# Patient Record
Sex: FEMALE | Race: WHITE | NOT HISPANIC OR LATINO | Employment: OTHER | URBAN - METROPOLITAN AREA
[De-identification: names, ages, dates, MRNs, and addresses within clinical notes are randomized per-mention and may not be internally consistent; named-entity substitution may affect disease eponyms.]

---

## 2017-01-18 ENCOUNTER — HOSPITAL ENCOUNTER (OUTPATIENT)
Dept: CT IMAGING | Facility: HOSPITAL | Age: 82
Discharge: HOME | End: 2017-01-18
Attending: THORACIC SURGERY (CARDIOTHORACIC VASCULAR SURGERY) | Admitting: THORACIC SURGERY (CARDIOTHORACIC VASCULAR SURGERY)

## 2017-01-18 DIAGNOSIS — R91.8 MULTIPLE PULMONARY NODULES: ICD-10-CM

## 2017-01-18 PROCEDURE — 71250 CT THORAX DX C-: CPT

## 2017-01-25 ENCOUNTER — OFFICE VISIT (OUTPATIENT)
Dept: SURGERY | Facility: CLINIC | Age: 82
End: 2017-01-25

## 2017-01-25 VITALS
DIASTOLIC BLOOD PRESSURE: 96 MMHG | BODY MASS INDEX: 31.76 KG/M2 | OXYGEN SATURATION: 96 % | HEIGHT: 64 IN | SYSTOLIC BLOOD PRESSURE: 160 MMHG | HEART RATE: 84 BPM | RESPIRATION RATE: 16 BRPM | WEIGHT: 186 LBS

## 2017-01-25 DIAGNOSIS — R91.8 PULMONARY NODULES/LESIONS, MULTIPLE: Primary | ICD-10-CM

## 2017-01-25 DIAGNOSIS — F41.9 ANXIETY: ICD-10-CM

## 2017-01-25 PROCEDURE — 99213 OFFICE O/P EST LOW 20 MIN: CPT | Performed by: NURSE PRACTITIONER

## 2017-01-25 RX ORDER — CLOPIDOGREL BISULFATE 75 MG/1
75 TABLET ORAL DAILY
Qty: 90 TABLET | Refills: 0 | Status: SHIPPED | OUTPATIENT
Start: 2017-01-25 | End: 2017-04-23 | Stop reason: SDUPTHER

## 2017-01-25 RX ORDER — FUROSEMIDE 20 MG/1
20 TABLET ORAL
COMMUNITY
Start: 2015-05-30 | End: 2018-05-08

## 2017-01-25 RX ORDER — THIAMINE HCL 100 MG
3000 TABLET ORAL
COMMUNITY
End: 2018-05-08

## 2017-01-25 RX ORDER — CITALOPRAM 10 MG/1
10 TABLET ORAL DAILY
Qty: 90 TABLET | Refills: 0 | Status: SHIPPED | OUTPATIENT
Start: 2017-01-25 | End: 2017-04-13 | Stop reason: SDUPTHER

## 2017-01-25 RX ORDER — ONDANSETRON 4 MG/1
4 TABLET, FILM COATED ORAL EVERY 6 HOURS
COMMUNITY
Start: 2015-09-10 | End: 2017-01-30 | Stop reason: CLARIF

## 2017-01-25 RX ORDER — VALSARTAN 160 MG/1
160 TABLET ORAL
COMMUNITY
Start: 2015-05-30 | End: 2017-01-25

## 2017-01-25 RX ORDER — FAMOTIDINE 20 MG/1
20 TABLET, FILM COATED ORAL DAILY
COMMUNITY

## 2017-01-25 NOTE — MR AVS SNAPSHOT
Siobhan Dueñas   1/25/2017 1:30 PM   Office Visit    Dept Phone:  [phone]   Encounter #:  [id number]    Provider:  BHAVYA William   Department:  [facility] THORACIC SURGERY                Your Full Care Plan              Today's Medication Changes          These changes are accurate as of: 1/25/17  1:54 PM.  If you have any questions, ask your nurse or doctor.               Medication(s)that have changed:     furosemide 20 MG tablet   Commonly known as:  LASIX   Take 20 mg by mouth.   What changed:  Another medication with the same name was removed. Continue taking this medication, and follow the directions you see here.   Changed by:  Juve Amin MA       ondansetron 4 MG tablet   Commonly known as:  ZOFRAN   Take 4 mg by mouth Every 6 (Six) Hours.   What changed:  Another medication with the same name was removed. Continue taking this medication, and follow the directions you see here.   Changed by:  Juve Amin MA       vitamin B-12 2500 MCG sublingual tablet tablet   Commonly known as:  CYANOCOBALAMIN   Place  under the tongue.   What changed:  Another medication with the same name was removed. Continue taking this medication, and follow the directions you see here.   Changed by:  Juve Amin MA                  Your Updated Medication List          This list is accurate as of: 1/25/17  1:54 PM.  Always use your most recent med list.                ACCU-CHEK INSTANT CONTROL liquid       allopurinol 100 MG tablet   Commonly known as:  ZYLOPRIM       calcium citrate-vitamin d 315-250 MG-UNIT tablet tablet   Commonly known as:  CALCITRATE       carvedilol 25 MG tablet   Commonly known as:  COREG       citalopram 10 MG tablet   Commonly known as:  CELEXA   Take 1 tablet by mouth daily.       clopidogrel 75 MG tablet   Commonly known as:  PLAVIX       famotidine 20 MG tablet   Commonly known as:  PEPCID       furosemide 20 MG tablet   Commonly known as:   ABRIL MAGALLON BLOOD GLUCOSE W/DEVICE kit       MULTIVITAMINS PO       ondansetron 4 MG tablet   Commonly known as:  ZOFRAN       pravastatin 80 MG tablet   Commonly known as:  PRAVACHOL   Take 1 tablet by mouth Daily for 90 days.       traMADol 50 MG tablet   Commonly known as:  ULTRAM   Take 1 tablet by mouth Every 6 (Six) Hours As Needed for moderate pain (4-6).       vitamin B-12 2500 MCG sublingual tablet tablet   Commonly known as:  CYANOCOBALAMIN       vitamin D 55833 UNITS capsule capsule   Commonly known as:  ERGOCALCIFEROL   Take 1 capsule by mouth 1 (One) Time Per Week.       vitamin D3 5000 UNITS capsule capsule               You Were Diagnosed With        Codes Comments    Pulmonary nodules/lesions, multiple    -  Primary ICD-10-CM: R91.8  ICD-9-CM: 793.19       Instructions     None    Patient Instructions History      Upcoming Appointments     Visit Type Date Time Department    OFFICE VISIT 1/25/2017  1:30 PM [facility]    [facility] 4/6/2017  9:30 AM [facility]    FOLLOW UP 4/13/2017  9:30 AM [facility]      MyChart Signup     Our records indicate that your [employer] account has been deactivated. If you would like to reactivate your account, please email [email or web address] or call [phone] to talk to our [employer] staff.             Other Info from Your Visit           Your Appointments     Apr 06, 2017  9:30 AM EDT   [facility] with ASHANTI BECK   [facility]IC[facility] (--)    [address] [facility] [address]   [phone]            Apr 13, 2017  9:30 AM EDT   Follow Up with Marino Higgins MD   [facility] MEDIC[facility] (--)    [address] [facility] [place] [address]   [phone]           Arrive 15 minutes prior to appointment.              Allergies     Hydrocodone  Nausea And Vomiting, Swelling    Mouth swelling    Hydromorphone  Swelling    Oxycodone  Swelling     "Atenolol      Codeine      Diovan [Valsartan]  Dizziness, Cough      Reason for Visit     Follow-up 6 MO F/U VISIT CT CHEST W/O CONTRAST      Vital Signs     Blood Pressure Pulse Respirations Height Weight Oxygen Saturation    160/96 (BP Location: Left arm, Patient Position: Sitting, Cuff Size: Adult) 84 16 64\" (162.6 cm) 186 lb (84.4 kg) 96%    Body Mass Index Smoking Status                31.93 kg/m2 Never Smoker          Problems and Diagnoses Noted     Pulmonary nodules/lesions, multiple    -  Primary        "

## 2017-01-25 NOTE — PROGRESS NOTES
Subjective     Patient ID: Siobhan Dueñas is a 81 y.o. female who presents to the office today for a 6 month follow up ct chest without contrast.    History of Present Illness    Siobhan Dueñas presents to the office today for continued follow-up and surveillance concerning prior history of multiple tiny pulmonary nodules. Since her last visit here she has done well. She has occasional shortness of breath with exertion which improves with rest.  She has had a non-productive cough the past 3 days which she feels is related to sinus drainage.  She denies any complaints of fever, chills, hemoptysis, pleuritic chest pain, night sweats, hoarseness, or unintentional weight loss.    Past Medical History   Diagnosis Date   • COPD (chronic obstructive pulmonary disease)    • Hypertension    • Pacemaker      Past Surgical History   Procedure Laterality Date   • Pacemaker implantation     • Colonoscopy     • Kidney surgery     • Cataract extraction     • Cardiac catheterization       mild dz, no stents     Family History   Problem Relation Age of Onset   • Heart disease Mother    • Hypertension Mother    • Heart disease Father      Social History   Substance Use Topics   • Smoking status: Never Smoker   • Smokeless tobacco: Never Used   • Alcohol use No     Allergies:  Hydrocodone; Hydromorphone; Oxycodone; Atenolol; Codeine; and Diovan [valsartan]    Review of Systems   Constitution: Negative.   HENT: Negative.    Eyes: Negative.    Respiratory: Positive for cough (non-productive cough x 3-4 days.) and shortness of breath (w/ exertion).    Endocrine: Negative.    Hematologic/Lymphatic: Negative.    Skin: Negative.    Musculoskeletal: Negative.    Gastrointestinal: Negative.    Genitourinary: Negative.    Neurological: Negative.    Psychiatric/Behavioral: Negative.        Vitals:    01/25/17 1314   BP: 160/96   Pulse: 84   Resp: 16   SpO2: 96%       Objective     Physical Exam   Constitutional: She is oriented to person, place,  and time. Vital signs are normal. She appears well-developed.   HENT:   Head: Normocephalic and atraumatic.   Neck: Normal range of motion. Neck supple.   Cardiovascular: Normal rate, regular rhythm, normal heart sounds and intact distal pulses.    No murmur heard.  Pulmonary/Chest: Effort normal and breath sounds normal. She has no wheezes. She has no rhonchi. She has no rales. She exhibits no tenderness.   Abdominal: Soft. There is no tenderness.   Musculoskeletal: Normal range of motion. She exhibits no edema or tenderness.   Neurological: She is alert and oriented to person, place, and time. She has normal strength.   Skin: Skin is warm and dry. No rash noted. No cyanosis or erythema.   Psychiatric: She has a normal mood and affect. Her behavior is normal.       Review of Radiographic Studies:    Study Result   CT OF THE CHEST WITHOUT CONTRAST      HISTORY: 81-year-old female with a history of multiple pulmonary nodules  and renal cell carcinoma undergoing follow-up evaluation.      TECHNIQUE: Contiguous axial images were obtained through the chest  without IV contrast.      COMPARISON: CT of the chest without contrast, 12/28/2015.      FINDINGS: Reidentified are innumerable subcentimeter pulmonary nodules  that are similar in size and density that are seen scattered throughout  all lobes of both lungs but are predominantly within a subpleural  location. None of the nodules appear significantly changed in size or  appearance when compared to the prior examination. No dominant pulmonary  nodule is appreciated. Stable subpleural scarring is seen at the base of  the right lower lobe. No evidence for mediastinal, hilar, or axillary  adenopathy is appreciated. A stable 1.4 cm precarinal lymph node is  noted. The visualized soft tissue structures of the upper abdomen have a  normal appearance. Multilevel degenerative disc disease is seen within  the thoracic spine.      IMPRESSION:  Multiple bilateral subcentimeter  pulmonary nodules as  described that do not appear appreciably changed when compared to the  prior study of 12/28/2015. Exact etiology of the pulmonary nodules is  uncertain, but the appearance appears stable and nonprogressive. This  likely represents a chronic quiescent pulmonary infectious process  although superimposed pulmonary metastases cannot be completely  excluded.      This report was finalized on 1/19/2017 8:18 PM by Dr. Gen Martino MD.         Assessment/Plan   Diagnoses and all orders for this visit:    Pulmonary nodules/lesions, multiple  -     CT Chest Without Contrast; Future      SUMMARY  Siobhan Dueñas has been doing well since she was last seen.  She denies any new complaints or concerns.  Her CT scan of chest remains stable and unchanged compared to prior study completed 12/28/15.  We will continue surveillance with a repeat CT scan of chest without contrast in one year and follow-up with us in the office.  In the meantime, instructed to contact us sooner for any problems or concerns.        Mariama Leo, APRN  01/27/17  3:57 PM

## 2017-01-26 RX ORDER — ALLOPURINOL 100 MG/1
TABLET ORAL
Qty: 90 TABLET | Refills: 2 | Status: SHIPPED | OUTPATIENT
Start: 2017-01-26 | End: 2017-10-20 | Stop reason: SDUPTHER

## 2017-01-30 RX ORDER — ONDANSETRON 4 MG/1
TABLET, FILM COATED ORAL
Qty: 30 TABLET | Refills: 0 | Status: SHIPPED | OUTPATIENT
Start: 2017-01-30 | End: 2018-05-21

## 2017-02-02 DIAGNOSIS — E55.9 VITAMIN D DEFICIENCY: ICD-10-CM

## 2017-02-02 RX ORDER — CARVEDILOL 12.5 MG/1
TABLET ORAL
Qty: 60 TABLET | Refills: 4 | Status: SHIPPED | OUTPATIENT
Start: 2017-02-02 | End: 2017-05-19

## 2017-02-02 RX ORDER — ERGOCALCIFEROL 1.25 MG/1
CAPSULE ORAL
Qty: 12 CAPSULE | Refills: 0 | Status: SHIPPED | OUTPATIENT
Start: 2017-02-02 | End: 2017-04-23 | Stop reason: SDUPTHER

## 2017-02-13 ENCOUNTER — OFFICE VISIT (OUTPATIENT)
Dept: FAMILY MEDICINE CLINIC | Facility: CLINIC | Age: 82
End: 2017-02-13

## 2017-02-13 VITALS
DIASTOLIC BLOOD PRESSURE: 86 MMHG | OXYGEN SATURATION: 96 % | HEART RATE: 76 BPM | TEMPERATURE: 98 F | HEIGHT: 64 IN | BODY MASS INDEX: 33.05 KG/M2 | WEIGHT: 193.6 LBS | SYSTOLIC BLOOD PRESSURE: 140 MMHG

## 2017-02-13 DIAGNOSIS — M51.34 DDD (DEGENERATIVE DISC DISEASE), THORACIC: ICD-10-CM

## 2017-02-13 DIAGNOSIS — M15.9 PRIMARY OSTEOARTHRITIS INVOLVING MULTIPLE JOINTS: Primary | ICD-10-CM

## 2017-02-13 PROCEDURE — 99213 OFFICE O/P EST LOW 20 MIN: CPT | Performed by: FAMILY MEDICINE

## 2017-02-13 NOTE — PROGRESS NOTES
Subjective   Siobhan Dueñas is a 81 y.o. female who is here for   Chief Complaint   Patient presents with   • arthritis pain     in lower back    .     History of Present Illness   Back Pain: Patient presents for presents evaluation of low back problems.  Symptoms have been present for several years and include pain in inbetween the shoulder blades (aching in character; 5/10 in severity). Initial inciting event: none. Symptoms are worst: morning. Alleviating factors identifiable by patient are heating pad and tylenol. Exacerbating factors identifiable by patient are standing. Treatments so far initiated by patient: heating pad, tylenol Previous lower back problems: known OA. Previous workup: CT of chest ( to eval her pulm nodules). Previous treatments: none.  Also with lumbar pain, feels better leaning forward.  Can not tolerate any pain meds  Is on Plavix so will need to restrict NSAID use.          The following portions of the patient's history were reviewed and updated as appropriate: allergies, current medications, past family history, past medical history, past social history, past surgical history and problem list.    Review of Systems    Objective   Physical Exam   Musculoskeletal:        Thoracic back: She exhibits tenderness and spasm.        Back:    Nursing note and vitals reviewed.  CT OF THE CHEST WITHOUT CONTRAST      HISTORY: 81-year-old female with a history of multiple pulmonary nodules  and renal cell carcinoma undergoing follow-up evaluation.      TECHNIQUE: Contiguous axial images were obtained through the chest  without IV contrast.      COMPARISON: CT of the chest without contrast, 12/28/2015.      FINDINGS: Reidentified are innumerable subcentimeter pulmonary nodules  that are similar in size and density that are seen scattered throughout  all lobes of both lungs but are predominantly within a subpleural  location. None of the nodules appear significantly changed in size or  appearance when  compared to the prior examination. No dominant pulmonary  nodule is appreciated. Stable subpleural scarring is seen at the base of  the right lower lobe. No evidence for mediastinal, hilar, or axillary  adenopathy is appreciated. A stable 1.4 cm precarinal lymph node is  noted. The visualized soft tissue structures of the upper abdomen have a  normal appearance. Multilevel degenerative disc disease is seen within  the thoracic spine.      IMPRESSION:  Multiple bilateral subcentimeter pulmonary nodules as  described that do not appear appreciably changed when compared to the  prior study of 12/28/2015. Exact etiology of the pulmonary nodules is  uncertain, but the appearance appears stable and nonprogressive. This  likely represents a chronic quiescent pulmonary infectious process  although superimposed pulmonary metastases cannot be completely  excluded.    Assessment/Plan   Siobhan was seen today for arthritis pain.    Diagnoses and all orders for this visit:    Primary osteoarthritis involving multiple joints    DDD (degenerative disc disease), thoracic  -     Ambulatory Referral to Physical Therapy Evaluate and treat      Patient Instructions   Heating pad is ok  PT to teach HEP  2 tylenol in am and pm; 1 advil at lunch      There are no discontinued medications.     No Follow-up on file.    Dr. Marino Higgins  [facility]  [place], [place].

## 2017-02-21 ENCOUNTER — TREATMENT (OUTPATIENT)
Dept: PHYSICAL THERAPY | Facility: CLINIC | Age: 82
End: 2017-02-21

## 2017-02-21 DIAGNOSIS — M51.34 DEGENERATION OF THORACIC INTERVERTEBRAL DISC: Primary | ICD-10-CM

## 2017-02-21 PROCEDURE — 97161 PT EVAL LOW COMPLEX 20 MIN: CPT | Performed by: PHYSICAL THERAPIST

## 2017-02-21 PROCEDURE — 97110 THERAPEUTIC EXERCISES: CPT | Performed by: PHYSICAL THERAPIST

## 2017-02-21 PROCEDURE — G8979 MOBILITY GOAL STATUS: HCPCS | Performed by: PHYSICAL THERAPIST

## 2017-02-21 PROCEDURE — G8978 MOBILITY CURRENT STATUS: HCPCS | Performed by: PHYSICAL THERAPIST

## 2017-02-21 PROCEDURE — 97035 APP MDLTY 1+ULTRASOUND EA 15: CPT | Performed by: PHYSICAL THERAPIST

## 2017-02-21 NOTE — PROGRESS NOTES
Physical Therapy Initial Evaluation and Plan of Care      Patient: Siobhan Dueñas   : 1935  Diagnosis/ICD-10 Code:  No primary diagnosis found.  Referring practitioner: Marino Higgins MD    Subjective Evaluation    History of Present Illness  Date of onset: 2017  Mechanism of injury: Progressive back pain from arthritis    Quality of life: good    Pain  Current pain ratin  At best pain rating: 3  At worst pain rating: 10  Location: mid- thoracic back pain   Quality: burning and dull ache  Relieving factors: heat  Exacerbated by: sleeping, lifting, prolonged standing and sitting.  Progression: no change    Diagnostic Tests  Abnormal CT scan: DDD thoracic     Treatments  Previous treatment: medication (tylenol)  Current treatment: physical therapy  Patient Goals  Patient goals for therapy: decreased pain, increased motion, increased strength and return to sport/leisure activities             Objective     Static Posture     Thoracic Spine  Hyperkyphosis.    Postural Observations  Seated posture: fair  Standing posture: fair        Palpation   Left   Tenderness of the erector spinae, lumbar paraspinals, quadratus lumborum and rhomboids.     Right Tenderness of the erector spinae, lumbar paraspinals, quadratus lumborum and rhomboids.     Active Range of Motion     Lumbar   Flexion: 100 degrees   Extension: 100 degrees   Left lateral flexion: 100 degrees   Right lateral flexion: 100 degrees   Left rotation: 100 degrees with pain  Right rotation: 100 degrees with pain    Strength/Myotome Testing     Left Hip   Planes of Motion   Flexion: 4    Right Hip   Planes of Motion   Flexion: 4    Left Knee   Flexion: 5  Extension: 5    Right Knee   Flexion: 5  Extension: 5    Left Ankle/Foot   Dorsiflexion: 5  Plantar flexion: 5    Right Ankle/Foot   Dorsiflexion: 5  Plantar flexion: 5    Tests       Thoracic   Negative slump.     Lumbar     Left   Negative crossed SLR and passive SLR.     Right   Negative crossed  SLR and passive SLR.          Assessment & Plan     Assessment  Impairments: abnormal muscle tone, abnormal or restricted ROM, activity intolerance, impaired physical strength and pain with function  Assessment details: 81 y.o female presents with 1) tender thoracic PVM's 2) decreased lumbar rotation ROM 3) decreased bilateral hip flexion strength 4) pain with prolonged sitting and standing  Prognosis: fair  Prognosis details: SHORT TERM GOALS:     4 weeks  1. Pt independent with HEP  2. Pt to demonstrate trunk AROM % of expected norms to allow for improved ability to perform ADL's  3. Pt to demonstrate bilateral hip strength 4+/5 in all planes to improved stability of the core/trunk     LONG TERM GOALS:   8 weeks    1. Pt to demonstrate ability to perform full functional squat with good form and without increased pain in the low back   2. Pt to report being able to work in the home without increase in pain in the back  3. No tenderness reported through thoracic PVM's    Plan  Therapy options: will be seen for skilled physical therapy services  Planned modality interventions: electrical stimulation/Russian stimulation, thermotherapy (hydrocollator packs), traction, ultrasound and cryotherapy  Planned therapy interventions: abdominal trunk stabilization, flexibility, functional ROM exercises, home exercise program, manual therapy, postural training, soft tissue mobilization, spinal/joint mobilization, strengthening, stretching and therapeutic activities  Frequency: 2x week  Duration in weeks: 8  Treatment plan discussed with: patient        Manual Therapy:         mins  41606;  Therapeutic Exercise:    15     mins  61280;     Neuromuscular Alethea:        mins  55649;    Therapeutic Activity:          mins  04877;     Gait Training:           mins  95885;     Ultrasound:     8     mins  23058;    Electrical Stimulation:         mins  08453 ( );  Dry Needling          mins self-pay    Timed Treatment:   23    mins   Total Treatment:     53   mins    PT SIGNATURE: Lety Maurice, PT   DATE TREATMENT INITIATED: 2/21/2017    Medicare Initial Certification  Certification Period: 5/22/2017  I certify that the therapy services are furnished while this patient is under my care.  The services outlined above are required by this patient, and will be reviewed every 90 days.     PHYSICIAN: Marino Higgins MD      DATE:     Please sign and return via fax to [phone].. Thank you, [facility].

## 2017-02-23 ENCOUNTER — TREATMENT (OUTPATIENT)
Dept: PHYSICAL THERAPY | Facility: CLINIC | Age: 82
End: 2017-02-23

## 2017-02-23 DIAGNOSIS — M51.34 DEGENERATION OF THORACIC INTERVERTEBRAL DISC: Primary | ICD-10-CM

## 2017-02-23 PROCEDURE — 97110 THERAPEUTIC EXERCISES: CPT | Performed by: PHYSICAL THERAPIST

## 2017-02-23 NOTE — PROGRESS NOTES
Physical Therapy Daily Progress Note    Time In 2:28  Time Out 3:18    Siobhan Spenser reports: I was sore after last treatment and doing all those exercises    Subjective     Objective     Palpation     Additional Palpation Details  Tender Bilateral T5-T10 PVM's and UT     See Exercise, Manual, and Modality Logs for complete treatment.       Assessment & Plan     Assessment  Assessment details: No significant improvement in PT thus far. Deficits persist in pain with there ex, tenderness, and decreased mobility. Cont PT 2-3x per week for mobility and strengthening - next visit attempt piriformis stretching and/or supine AH if tolerated.         Progress strengthening /stabilization /functional activity           Manual Therapy:        mins  73351;  Therapeutic Exercise:    27     mins  04948;     Neuromuscular Alethea:        mins  55993;    Therapeutic Activity:          mins  27714;     Gait Training:           mins  90826;     Ultrasound:     8     mins  30834;    Electrical Stimulation:         mins  36374 ( );  Dry Needling          mins self-pay    Timed Treatment:   35   mins   Total Treatment:     45   mins    Lety Maurice, PT  Physical Therapist  [place] License # [id number]

## 2017-02-27 ENCOUNTER — TREATMENT (OUTPATIENT)
Dept: PHYSICAL THERAPY | Facility: CLINIC | Age: 82
End: 2017-02-27

## 2017-02-27 DIAGNOSIS — M51.34 DEGENERATION OF THORACIC INTERVERTEBRAL DISC: Primary | ICD-10-CM

## 2017-02-27 PROCEDURE — 97110 THERAPEUTIC EXERCISES: CPT | Performed by: PHYSICAL THERAPIST

## 2017-02-27 PROCEDURE — 97530 THERAPEUTIC ACTIVITIES: CPT | Performed by: PHYSICAL THERAPIST

## 2017-02-27 NOTE — PROGRESS NOTES
Physical Therapy Daily Progress Note    Time In 3:25  Time Out 4:19    Siobhan Dueñas reports: I am doing great - no pain all weekend long    Subjective     Objective     Ambulation     Ambulation: Level Surfaces   Ambulation without assistive device: independent    Observational Gait   Walking speed and stride length within functional limits.     Quality of Movement During Gait   Trunk  Trunk within functional limits.    No tenderness T-spine and B medial border of scapula    See Exercise, Manual, and Modality Logs for complete treatment.       Assessment & Plan     Assessment  Assessment details: Improved subjective reports and tendernss. Deficits persist in pain end-range and decreased mobility. Cont PT 1-2x per week for mobility and strengthening - next visit attempt AH with hip add if tolerated and/or thoracic extension. Patient request to decrease PT to 1x per week.         Progress strengthening /stabilization /functional activity           Manual Therapy:        mins  33090;  Therapeutic Exercise:    30     mins  03545;     Neuromuscular Alethea:        mins  21069;    Therapeutic Activity:     10     mins  00515;     Gait Training:           mins  20953;     Ultrasound:          mins  36330;    Electrical Stimulation:         mins  65126 ( );  Dry Needling          mins self-pay    Timed Treatment:   40   mins   Total Treatment:     50   mins    Lety Maurice, PT  Physical Therapist  [place] License # [id number]

## 2017-03-06 ENCOUNTER — TREATMENT (OUTPATIENT)
Dept: PHYSICAL THERAPY | Facility: CLINIC | Age: 82
End: 2017-03-06

## 2017-03-06 DIAGNOSIS — M51.34 DEGENERATION OF THORACIC INTERVERTEBRAL DISC: Primary | ICD-10-CM

## 2017-03-06 PROCEDURE — 97110 THERAPEUTIC EXERCISES: CPT | Performed by: PHYSICAL THERAPIST

## 2017-03-06 NOTE — PROGRESS NOTES
Physical Therapy Daily Progress Note    Time In 2:30  Time Out 3:13    Siobhan Dueñas reports: I have a burning pain in my back that wakes me up at night    Subjective     Objective     Palpation   Left   Tenderness of the cervical paraspinals, middle trapezius and rhomboids.     Right Tenderness of the cervical paraspinals, middle trapezius and rhomboids.      See Exercise, Manual, and Modality Logs for complete treatment.       Assessment & Plan     Assessment  Assessment details: Increased pain in thoracic region at night with sleeping. Cont PT 1-2x per week for mobility and strengthening - next visit attempt AH with hip abd if tolerated and/or thoracic extension.        Progress strengthening /stabilization /functional activity           Manual Therapy:         mins  51337;  Therapeutic Exercise:    30     mins  89183;     Neuromuscular Alethea:        mins  57848;    Therapeutic Activity:          mins  54462;     Gait Training:           mins  90384;     Ultrasound:          mins  30099;    Electrical Stimulation:         mins  37945 ( );  Dry Needling          mins self-pay    Timed Treatment:   30   mins   Total Treatment:     40   mins    Lety Maurice, PT  Physical Therapist  [place] License # [id number]

## 2017-03-13 ENCOUNTER — TREATMENT (OUTPATIENT)
Dept: PHYSICAL THERAPY | Facility: CLINIC | Age: 82
End: 2017-03-13

## 2017-03-13 DIAGNOSIS — M51.34 DEGENERATION OF THORACIC INTERVERTEBRAL DISC: Primary | ICD-10-CM

## 2017-03-13 PROCEDURE — 97110 THERAPEUTIC EXERCISES: CPT | Performed by: PHYSICAL THERAPIST

## 2017-03-13 PROCEDURE — 97530 THERAPEUTIC ACTIVITIES: CPT | Performed by: PHYSICAL THERAPIST

## 2017-03-13 NOTE — PROGRESS NOTES
Physical Therapy Daily Progress Note    Time In 2:30  Time Out 3:23    Siobhan Dueñas reports: I have been doing my exercises twice a day - I think I am getting better    Subjective     Objective     Static Posture     Thoracic Spine  Hyperkyphosis.     See Exercise, Manual, and Modality Logs for complete treatment.       Assessment & Plan     Assessment  Assessment details: Improved subjective reports and thoracic mobility. Deficits persist with pain with prolonged standing.  Cont PT 1-2x per week for mobility and strengthening - next visit attempt thoracic extension if tolerated.        Progress strengthening /stabilization /functional activity           Manual Therapy:         mins  52124;  Therapeutic Exercise:    30     mins  78305;     Neuromuscular Alethea:        mins  57575;    Therapeutic Activity:     10     mins  43862;     Gait Training:           mins  89692;     Ultrasound:          mins  82584;    Electrical Stimulation:         mins  49456 ( );  Dry Needling          mins self-pay    Timed Treatment:   40   mins   Total Treatment:     50   mins    Lety Maurice, PT  Physical Therapist  [place] License # [id number]

## 2017-03-22 ENCOUNTER — TREATMENT (OUTPATIENT)
Dept: PHYSICAL THERAPY | Facility: CLINIC | Age: 82
End: 2017-03-22

## 2017-03-22 DIAGNOSIS — M51.34 DEGENERATION OF THORACIC INTERVERTEBRAL DISC: Primary | ICD-10-CM

## 2017-03-22 PROCEDURE — 97530 THERAPEUTIC ACTIVITIES: CPT | Performed by: PHYSICAL THERAPIST

## 2017-03-22 PROCEDURE — 97110 THERAPEUTIC EXERCISES: CPT | Performed by: PHYSICAL THERAPIST

## 2017-03-22 NOTE — PROGRESS NOTES
Physical Therapy Daily Progress Note    Time In 2:27  Time Out 3:19    Siobhan Dueñas reports: I am doing really good - think today is my last day -     Subjective     Objective     Tenderness     Lumbar Spine  No tenderness in the spinous process.     Left Hip   No tenderness in the PSIS.     Right Hip   No tenderness in the PSIS.     Active Range of Motion     Lumbar   Flexion: 100 degrees   Extension: 100 degrees   Left lateral flexion: 100 degrees   Right lateral flexion: 100 degrees   Left rotation: 100 degrees   Right rotation: 100 degrees     Strength/Myotome Testing     Left Hip   Planes of Motion   Left hip flexors strength: 5-/5.    Right Hip   Planes of Motion   Right hip flexors strength: 5-/5.    Left Knee   Flexion: 5  Extension: 5    Right Knee   Flexion: 5  Extension: 5    Left Ankle/Foot   Dorsiflexion: 5  Plantar flexion: 5  Inversion: 5  Eversion: 5    Right Ankle/Foot   Dorsiflexion: 5  Plantar flexion: 5  Inversion: 5  Eversion: 5    Tests       Thoracic   Negative slump.     Lumbar     Left   Negative crossed SLR.     Right   Negative crossed SLR.     Additional Tests Details  Negative Bilateral SHARIFA     See Exercise, Manual, and Modality Logs for complete treatment.       Assessment & Plan     Assessment  Assessment details: Patient presents with improved ROM, strength, and lumbar stability. Ms. Dueñas has met all STG and LTG's. Feel that continued compliance with her HEP would be beneficial at this time. She has been instructed with HEP progression.         Other           Manual Therapy:        mins  31686;  Therapeutic Exercise:    25     mins  57515;     Neuromuscular Alethea:        mins  81623;    Therapeutic Activity:     10     mins  19233;     Gait Training:           mins  61945;     Ultrasound:          mins  79265;    Electrical Stimulation:         mins  07589 ( );  Dry Needling          mins self-pay    Timed Treatment:   35   mins   Total Treatment:     45   mins    Lety KELSEY  Josafat PT  Physical Therapist  [place] License # [id number]

## 2017-04-05 DIAGNOSIS — E11.9 TYPE 2 DIABETES MELLITUS WITHOUT COMPLICATION, WITHOUT LONG-TERM CURRENT USE OF INSULIN (HCC): ICD-10-CM

## 2017-04-05 DIAGNOSIS — E55.9 VITAMIN D DEFICIENCY: ICD-10-CM

## 2017-04-05 DIAGNOSIS — E78.2 MIXED HYPERLIPIDEMIA: Primary | ICD-10-CM

## 2017-04-06 LAB
25(OH)D3+25(OH)D2 SERPL-MCNC: 27.6 NG/ML
ALBUMIN SERPL-MCNC: 4.1 G/DL (ref 3.5–5.2)
ALBUMIN/GLOB SERPL: 1.1 G/DL
ALP SERPL-CCNC: 54 U/L (ref 40–129)
ALT SERPL-CCNC: 6 U/L (ref 5–33)
AST SERPL-CCNC: 14 U/L (ref 5–32)
BASOPHILS # BLD AUTO: 0.02 10*3/MM3 (ref 0–0.2)
BASOPHILS NFR BLD AUTO: 0.4 % (ref 0–2)
BILIRUB SERPL-MCNC: 0.3 MG/DL (ref 0.2–1.2)
BUN SERPL-MCNC: 25 MG/DL (ref 8–23)
BUN/CREAT SERPL: 21.6 (ref 7–25)
CALCIUM SERPL-MCNC: 9.2 MG/DL (ref 8.8–10.5)
CHLORIDE SERPL-SCNC: 98 MMOL/L (ref 98–107)
CHOLEST SERPL-MCNC: 249 MG/DL (ref 0–200)
CO2 SERPL-SCNC: 27.2 MMOL/L (ref 22–29)
CREAT SERPL-MCNC: 1.16 MG/DL (ref 0.57–1)
EOSINOPHIL # BLD AUTO: 0.08 10*3/MM3 (ref 0.1–0.3)
EOSINOPHIL NFR BLD AUTO: 1.5 % (ref 0–4)
ERYTHROCYTE [DISTWIDTH] IN BLOOD BY AUTOMATED COUNT: 14.6 % (ref 11.5–14.5)
GLOBULIN SER CALC-MCNC: 3.8 GM/DL
GLUCOSE SERPL-MCNC: 110 MG/DL (ref 65–99)
HBA1C MFR BLD: 5.2 % (ref 4.8–5.6)
HCT VFR BLD AUTO: 38.5 % (ref 37–47)
HDLC SERPL-MCNC: 63 MG/DL (ref 40–60)
HGB BLD-MCNC: 11.9 G/DL (ref 12–16)
IMM GRANULOCYTES # BLD: 0.01 10*3/MM3 (ref 0–0.03)
IMM GRANULOCYTES NFR BLD: 0.2 % (ref 0–0.5)
LDLC SERPL CALC-MCNC: 166 MG/DL (ref 0–100)
LYMPHOCYTES # BLD AUTO: 0.88 10*3/MM3 (ref 0.6–4.8)
LYMPHOCYTES NFR BLD AUTO: 16.6 % (ref 20–45)
MCH RBC QN AUTO: 25.8 PG (ref 27–31)
MCHC RBC AUTO-ENTMCNC: 30.9 G/DL (ref 31–37)
MCV RBC AUTO: 83.3 FL (ref 81–99)
MONOCYTES # BLD AUTO: 0.39 10*3/MM3 (ref 0–1)
MONOCYTES NFR BLD AUTO: 7.3 % (ref 3–8)
NEUTROPHILS # BLD AUTO: 3.93 10*3/MM3 (ref 1.5–8.3)
NEUTROPHILS NFR BLD AUTO: 74 % (ref 45–70)
NRBC BLD AUTO-RTO: 0 /100 WBC (ref 0–0)
PLATELET # BLD AUTO: 215 10*3/MM3 (ref 140–500)
POTASSIUM SERPL-SCNC: 4.7 MMOL/L (ref 3.5–5.2)
PROT SERPL-MCNC: 7.9 G/DL (ref 6–8.5)
RBC # BLD AUTO: 4.62 10*6/MM3 (ref 4.2–5.4)
SODIUM SERPL-SCNC: 138 MMOL/L (ref 136–145)
TRIGL SERPL-MCNC: 100 MG/DL (ref 0–150)
TSH SERPL DL<=0.005 MIU/L-ACNC: 2.2 MIU/ML (ref 0.27–4.2)
VLDLC SERPL CALC-MCNC: 20 MG/DL (ref 7–27)
WBC # BLD AUTO: 5.31 10*3/MM3 (ref 4.8–10.8)

## 2017-04-13 ENCOUNTER — OFFICE VISIT (OUTPATIENT)
Dept: FAMILY MEDICINE CLINIC | Facility: CLINIC | Age: 82
End: 2017-04-13

## 2017-04-13 VITALS
SYSTOLIC BLOOD PRESSURE: 160 MMHG | OXYGEN SATURATION: 99 % | DIASTOLIC BLOOD PRESSURE: 90 MMHG | BODY MASS INDEX: 32.78 KG/M2 | TEMPERATURE: 97.7 F | HEIGHT: 64 IN | HEART RATE: 78 BPM | WEIGHT: 192 LBS

## 2017-04-13 DIAGNOSIS — G72.0 STATIN MYOPATHY: ICD-10-CM

## 2017-04-13 DIAGNOSIS — M51.34 DDD (DEGENERATIVE DISC DISEASE), THORACIC: Primary | ICD-10-CM

## 2017-04-13 DIAGNOSIS — E78.2 MIXED HYPERLIPIDEMIA: ICD-10-CM

## 2017-04-13 DIAGNOSIS — T46.6X5A STATIN MYOPATHY: ICD-10-CM

## 2017-04-13 DIAGNOSIS — I63.20 CEREBROVASCULAR ACCIDENT (CVA) DUE TO OCCLUSION OF PRECEREBRAL ARTERY (HCC): ICD-10-CM

## 2017-04-13 DIAGNOSIS — F41.9 ANXIETY: ICD-10-CM

## 2017-04-13 PROBLEM — I42.9 CARDIOMYOPATHY (HCC): Status: ACTIVE | Noted: 2017-02-02

## 2017-04-13 PROCEDURE — 99214 OFFICE O/P EST MOD 30 MIN: CPT | Performed by: FAMILY MEDICINE

## 2017-04-13 RX ORDER — ATORVASTATIN CALCIUM 80 MG/1
80 TABLET, FILM COATED ORAL DAILY
Qty: 90 TABLET | Refills: 3 | Status: SHIPPED | OUTPATIENT
Start: 2017-04-13 | End: 2018-05-08

## 2017-04-13 RX ORDER — HYDRALAZINE HYDROCHLORIDE 10 MG/1
30 TABLET, FILM COATED ORAL 3 TIMES DAILY
COMMUNITY
Start: 2017-03-01 | End: 2018-05-21

## 2017-04-13 RX ORDER — CITALOPRAM 20 MG/1
20 TABLET ORAL EVERY MORNING
Qty: 90 TABLET | Refills: 3 | Status: SHIPPED | OUTPATIENT
Start: 2017-04-13 | End: 2018-05-08

## 2017-04-13 NOTE — PROGRESS NOTES
Chief Complaint   Patient presents with   • Hyperlipidemia   • Hypertension   • Osteoarthritis   • Follow-up       Subjective     Patient here for follow-up of elevated blood pressure.    She is not exercising and is adherent to a low-salt diet.    Blood pressure is well controlled at home.   Cardiac symptoms: fatigue.   Patient denies: chest pressure/discomfort, claudication, cough, dyspnea, exertional chest pressure/discomfort, irregular heart beat and lower extremity edema.   Cardiovascular risk factors: advanced age (older than 55 for men, 65 for women), diabetes mellitus, dyslipidemia, family history of premature cardiovascular disease and hypertension.   Use of agents associated with hypertension: none.   History of target organ damage: chronic kidney disease, heart failure and stroke.  Patient is taking prescribed hypertension medications as prescribed without side effects.    Daughter reports her anxiety is better on the Celexa and there is room for improvement, so we will increase to 40 mg    Daughter would like to re est with [place] [facility] for her CVA follow ups.  Good idea by me    Chol is not to goal on 80 of pravastatin will change to lipitor 80  Discussed co Q 10 for statin muscle aches    Reviewed her PT notes , her T spine pain is all better  Discussed continue home exercises.    The following portions of the patient's history were reviewed and updated as appropriate: allergies, current medications, past medical history, past social history, past surgical history and problem list.    Review of Systems  A comprehensive review of systems was negative except for: Musculoskeletal: positive for arthralgias, back pain, bone pain, muscle weakness, neck pain and stiff joints    Results for orders placed or performed in visit on 04/05/17   Comprehensive metabolic panel   Result Value Ref Range    Glucose 110 (H) 65 - 99 mg/dL    BUN 25 (H) 8 - 23 mg/dL    Creatinine 1.16 (H) 0.57 - 1.00 mg/dL    eGFR Non   Am 45 (L) >60 mL/min/1.73    eGFR African Am 55 (L) >60 mL/min/1.73    BUN/Creatinine Ratio 21.6 7.0 - 25.0    Sodium 138 136 - 145 mmol/L    Potassium 4.7 3.5 - 5.2 mmol/L    Chloride 98 98 - 107 mmol/L    Total CO2 27.2 22.0 - 29.0 mmol/L    Calcium 9.2 8.8 - 10.5 mg/dL    Total Protein 7.9 6.0 - 8.5 g/dL    Albumin 4.10 3.50 - 5.20 g/dL    Globulin 3.8 gm/dL    A/G Ratio 1.1 g/dL    Total Bilirubin 0.3 0.2 - 1.2 mg/dL    Alkaline Phosphatase 54 40 - 129 U/L    AST (SGOT) 14 5 - 32 U/L    ALT (SGPT) 6 5 - 33 U/L   Lipid panel   Result Value Ref Range    Total Cholesterol 249 (H) 0 - 200 mg/dL    Triglycerides 100 0 - 150 mg/dL    HDL Cholesterol 63 (H) 40 - 60 mg/dL    VLDL Cholesterol 20 7 - 27 mg/dL    LDL Cholesterol  166 (H) 0 - 100 mg/dL   TSH   Result Value Ref Range    TSH 2.200 0.270 - 4.200 mIU/mL   Vitamin D 25 hydroxy   Result Value Ref Range    25 Hydroxy, Vitamin D 27.6 ng/mL   Hemoglobin A1c   Result Value Ref Range    Hemoglobin A1C 5.20 4.80 - 5.60 %   CBC w AUTO Differential   Result Value Ref Range    WBC 5.31 4.80 - 10.80 10*3/mm3    RBC 4.62 4.20 - 5.40 10*6/mm3    Hemoglobin 11.9 (L) 12.0 - 16.0 g/dL    Hematocrit 38.5 37.0 - 47.0 %    MCV 83.3 81.0 - 99.0 fL    MCH 25.8 (L) 27.0 - 31.0 pg    MCHC 30.9 (L) 31.0 - 37.0 g/dL    RDW 14.6 (H) 11.5 - 14.5 %    Platelets 215 140 - 500 10*3/mm3    Neutrophil Rel % 74.0 (H) 45.0 - 70.0 %    Lymphocyte Rel % 16.6 (L) 20.0 - 45.0 %    Monocyte Rel % 7.3 3.0 - 8.0 %    Eosinophil Rel % 1.5 0.0 - 4.0 %    Basophil Rel % 0.4 0.0 - 2.0 %    Neutrophils Absolute 3.93 1.50 - 8.30 10*3/mm3    Lymphocytes Absolute 0.88 0.60 - 4.80 10*3/mm3    Monocytes Absolute 0.39 0.00 - 1.00 10*3/mm3    Eosinophils Absolute 0.08 (L) 0.10 - 0.30 10*3/mm3    Basophils Absolute 0.02 0.00 - 0.20 10*3/mm3    Immature Granulocyte Rel % 0.2 0.0 - 0.5 %    Immature Grans Absolute 0.01 0.00 - 0.03 10*3/mm3    nRBC 0.0 0.0 - 0.0 /100 WBC        Vitals:    04/13/17 0935   BP:  "160/90   Pulse: 78   Temp: 97.7 °F (36.5 °C)   SpO2: 99%   Weight: 192 lb (87.1 kg)   Height: 64\" (162.6 cm)     Objective    Gen: alert, pleasant.  HEENT: PERRL, EOMI intact, lids ok, ear canals clear, TMs normal, throat clear, nostrils normal  Neck: no bruit, no enlarged thyroid  Lungs: CTA  Heart: RR no murmur  ABD: soft , + BS  Pulses: intact  Mood: stable     Assessment/Plan   Hypertension, white coat Evidence of target organ damage: chronic kidney disease, heart failure and stroke.    Siobhan was seen today for hyperlipidemia, hypertension, osteoarthritis and follow-up.    Diagnoses and all orders for this visit:    DDD (degenerative disc disease), thoracic    Anxiety  -     citalopram (CeleXA) 20 MG tablet; Take 1 tablet by mouth Every Morning. Indications: anxiety    Statin myopathy  -     co-enzyme Q-10 50 MG capsule; Take 1 capsule by mouth Daily.    Mixed hyperlipidemia  -     atorvastatin (LIPITOR) 80 MG tablet; Take 1 tablet by mouth Daily.    Cerebrovascular accident (CVA) due to occlusion of precerebral artery  -     Ambulatory Referral to Neurology      Medication: no change.  Follow up: 3 months and as needed.    There are no Patient Instructions on file for this visit.  Medications Discontinued During This Encounter   Medication Reason   • calcium citrate-vitamin d (CALCITRATE) 315-250 MG-UNIT tablet tablet    • carvedilol (COREG) 25 MG tablet    • traMADol (ULTRAM) 50 MG tablet    • citalopram (CELEXA) 10 MG tablet Reorder   • pravastatin (PRAVACHOL) 80 MG tablet Not Efficacious        Return in about 3 months (around 7/13/2017) for new medication follow up.    Limit salt  Limit alcoholic drinks to 1 a day  Limit caffeine to 1-2 servings a day    Dr. Marino Higgins MD  [place], [place]  [facility].  "

## 2017-04-23 DIAGNOSIS — F41.9 ANXIETY: ICD-10-CM

## 2017-04-23 DIAGNOSIS — E55.9 VITAMIN D DEFICIENCY: ICD-10-CM

## 2017-04-24 RX ORDER — CITALOPRAM 10 MG/1
TABLET ORAL
Qty: 90 TABLET | Refills: 0 | Status: SHIPPED | OUTPATIENT
Start: 2017-04-24 | End: 2017-05-19

## 2017-04-24 RX ORDER — CLOPIDOGREL BISULFATE 75 MG/1
TABLET ORAL
Qty: 90 TABLET | Refills: 0 | Status: SHIPPED | OUTPATIENT
Start: 2017-04-24 | End: 2017-07-23 | Stop reason: SDUPTHER

## 2017-04-24 RX ORDER — ERGOCALCIFEROL 1.25 MG/1
CAPSULE ORAL
Qty: 12 CAPSULE | Refills: 0 | Status: SHIPPED | OUTPATIENT
Start: 2017-04-24 | End: 2017-07-17 | Stop reason: SDUPTHER

## 2017-05-19 ENCOUNTER — HOSPITAL ENCOUNTER (OUTPATIENT)
Dept: GENERAL RADIOLOGY | Facility: HOSPITAL | Age: 82
Discharge: HOME | End: 2017-05-19
Attending: FAMILY MEDICINE | Admitting: FAMILY MEDICINE

## 2017-05-19 ENCOUNTER — OFFICE VISIT (OUTPATIENT)
Dept: FAMILY MEDICINE CLINIC | Facility: CLINIC | Age: 82
End: 2017-05-19

## 2017-05-19 VITALS
SYSTOLIC BLOOD PRESSURE: 160 MMHG | DIASTOLIC BLOOD PRESSURE: 90 MMHG | RESPIRATION RATE: 14 BRPM | BODY MASS INDEX: 33.19 KG/M2 | WEIGHT: 194.4 LBS | TEMPERATURE: 98.1 F | HEART RATE: 90 BPM | HEIGHT: 64 IN | OXYGEN SATURATION: 99 %

## 2017-05-19 DIAGNOSIS — S99.922A INJURY OF LEFT FOOT INCLUDING TOES, INITIAL ENCOUNTER: ICD-10-CM

## 2017-05-19 DIAGNOSIS — S99.922A INJURY OF LEFT FOOT INCLUDING TOES, INITIAL ENCOUNTER: Primary | ICD-10-CM

## 2017-05-19 PROCEDURE — 99213 OFFICE O/P EST LOW 20 MIN: CPT | Performed by: FAMILY MEDICINE

## 2017-05-19 PROCEDURE — 73630 X-RAY EXAM OF FOOT: CPT

## 2017-05-19 RX ORDER — CARVEDILOL 25 MG/1
50 TABLET ORAL 2 TIMES DAILY WITH MEALS
COMMUNITY

## 2017-06-14 ENCOUNTER — OFFICE VISIT (OUTPATIENT)
Dept: NEUROLOGY | Facility: CLINIC | Age: 82
End: 2017-06-14

## 2017-06-14 VITALS
OXYGEN SATURATION: 97 % | HEART RATE: 105 BPM | DIASTOLIC BLOOD PRESSURE: 100 MMHG | SYSTOLIC BLOOD PRESSURE: 190 MMHG | BODY MASS INDEX: 33.29 KG/M2 | HEIGHT: 64 IN | WEIGHT: 195 LBS

## 2017-06-14 DIAGNOSIS — R41.3 MEMORY LOSS DUE TO MEDICAL CONDITION: Primary | ICD-10-CM

## 2017-06-14 DIAGNOSIS — I15.0 RENOVASCULAR HYPERTENSION: ICD-10-CM

## 2017-06-14 DIAGNOSIS — R90.82 WHITE MATTER ABNORMALITY ON MRI OF BRAIN: ICD-10-CM

## 2017-06-14 PROCEDURE — 99205 OFFICE O/P NEW HI 60 MIN: CPT | Performed by: RADIOLOGY

## 2017-06-14 NOTE — PROGRESS NOTES
"DOS: 2017  NAME: Siobhan Dueñas   : 1935  PCP: Marino Higgins MD    Chief Complaint   Patient presents with   • Stroke      Referring MD: Marino Higgins MD    Neurological Problem and Interval History:  81 y.o. RHW female with a Hx of HTN, renal cancer, HLD, DM, pacemaker for BBB and TIA. She presents today for evaluation of forgetfulness. Per her daughter she is having trouble with memory and is worried this could be stroke related.     She denies any S/S of stroke. She does get headache in the back right part of her head sometimes. She can take tylenol and it will improve. She has noticed she puts things in odd spots like the dish rag in the freezer. She does not remember putting it in the freezer but now knows she did it but not why. A few days ago she called her daughter to look at her foot because it was swollen/black and blue. She can remember what she did that caused this. An xray showed a fracture. She does think it could have happened when she got up to go to the bathroom at night and doesn't remember it. She does forget things her daughter tells her 5 minutes ago or the day before.     Her initial event was in  that caused confusion. Her daughter got a phone call that she did not show up to work and when her daughter went to see her she was on the couch dressed and ready and she was telling her daughter that she had just got home from work however, she had not been to work. Her daughter notes she was \"off the wall\" speaking gibberish. She went to the hospital in [facility]. They transferred her here to [facility], admitted her and Tx her for UTI. She also complained of H/A, head pain. They eventually did an MRI and it was negative but they Dx her with TIA. She was not on any blood thinner or antiplatelet. They D/C her on Plavix. They never found the cause. She was told to see a neurologist and Dr. Crockett sent her to Dr. Clay who did labs. She never followed up with " him.     In May 2015 she passed out at home and her daughter found her and thought she acted just like she did when she had a TIA. She went from the den to the bathroom and as soon as she got in the bathroom she passed out and hit the toilet. She eventually was able to get to the phone by her bed. She may have been out for an hour. She did not feel that she was going to faint. She went to [facility], had a CT scan of her head and did not Dx her with TIA or stroke. They ran other test and did a heart cath that was completely normal. They ended up placing a pacemaker at [facility]. Her daughter feels that she was on too much medicine that caused the event. Since she has had adjustments in her meds and done better.     She just started taking Lipitor. Her BP is usually 150-170. Her daughter has noticed memory trouble over the past year and a half but this has worsened over the past 6 months. Her daughter has noticed a tremor for about 6 months especially when she goes to write a check. She does live alone and manges her own ADLs. She has missed one bill because she thought it was already paid but really she had mis placed it.     Review of Systems:        Review of Systems   Constitutional: Negative.    HENT: Positive for hearing loss and tinnitus. Negative for congestion, dental problem, drooling, ear discharge, ear pain, facial swelling, mouth sores, nosebleeds, postnasal drip, rhinorrhea, sinus pressure, sneezing, sore throat, trouble swallowing and voice change.    Eyes: Negative.    Respiratory: Negative.    Cardiovascular: Negative.    Gastrointestinal: Positive for diarrhea and nausea. Negative for abdominal distention, abdominal pain, anal bleeding, blood in stool, constipation, rectal pain and vomiting.   Endocrine: Negative.    Genitourinary: Negative.    Musculoskeletal: Positive for back pain and gait problem. Negative for arthralgias, joint swelling, myalgias, neck pain and neck stiffness.  "  Skin: Negative.    Allergic/Immunologic: Negative.    Neurological: Negative for dizziness, tremors, seizures, syncope, facial asymmetry, speech difficulty, weakness, light-headedness, numbness and headaches.   Hematological: Negative.    Psychiatric/Behavioral: Negative.      \"The following portions of the patient's history were reviewed and updated as appropriate: allergies, current medications, past family history, past medical history, past social history, past surgical history and problem list.\"    Review and Interpretation of Imaging:  MRI brain 5/23/15: DWI negative, flair shows moderate subcortical and periventricular white matter disease, mild T2 hyperintensity in the mid estela, SWI sequences negative except for possible single lesion in the right medial temporal lobe although it’s just at the level of the tentorium and could be artifact  MRA [place] fair quality 5/23/15: Normal  MRA aortic arch with contrast: Poor image quality, left, carotid artery is not well seen neither is the right, carotid artery is likely artifactual, vertebral artery origins are not well visualized and there may be stenoses but also disliked artifactual.  There is tortuosity of the vessels.  Internal carotid arteries appear to be patent.  MRI brain 12/26/2009: DWI negative, mild periventricular subcortical white matter disease MRA [place] normal  MRI 9/10/2003: DWI negative FLAIR shows minimal white matter disease    Laboratory Results:             Lab Results   Component Value Date    HGBA1C 5.20 04/06/2017     Lab Results   Component Value Date    HDL 63 (H) 04/06/2017    HDL 59 09/22/2016    HDL 53 03/15/2016     Lab Results   Component Value Date     (H) 04/06/2017     (H) 09/22/2016     (H) 03/15/2016     Lab Results   Component Value Date    TRIG 100 04/06/2017    TRIG 107 09/22/2016    TRIG 162 (H) 03/15/2016     No results found for: RPR  Lab Results   Component Value Date    TSH " 2.200 04/06/2017     Lab Results   Component Value Date    [id number] 260 09/17/2015       Physical Examination: NIHSS: 0 mRS: 0  General Appearance:   Well developed, obese, well groomed, alert, and cooperative.  HEENT: Normocephalic.  Normal fundoscopic exam including normal retina, discs are flat with sharp margins, normal vasculature except for subtle AV nicking.   Neck and Spine: Normal range of motion.  Normal alignment. No mass or tenderness. No bruits.  Cardiac: Regular rate and rhythm. No murmurs.  Peripheral Vasculature: Radial and pedal pulses are equal and symmetric. No signs of distal embolization.  Extremities:    No edema or deformities. Normal joint ROM.  Skin:    No rashes or birth marks.    Neurological examination:  Higher Integrative  Function: Oriented to Wednesday June 14, 1915, 2017, place and person. Normal registration, recall 3/3 with 1 clue, attention span and concentration. Normal language including comprehension, spontaneous speech, repetition, reading, writing, naming and vocabulary. No neglect with normal visual-spatial function and construction. Fair fund of knowledge (she did better with more recent presidents then with earlier ones) and higher integrative function.  She required multiple attempts at performing a Luria hand test and could perform it properly every other attempt.  She could not name the hammock.   CN II: Pupils are equal, round, and reactive to light. Normal visual acuity and visual fields.    CN III IV VI: Extraocular movements are full without nystagmus.   CN V: Normal facial sensation and strength of muscles of mastication.  CN VII: Facial movements are symmetric. No weakness.  CN VIII:   Auditory acuity is reduced.  CN IX & X:   Symmetric palatal movement.  CN XI: Sternocleidomastoid and trapezius are normal.  No weakness.  CN XII:   The tongue is midline.  No atrophy or fasciculations.  Motor: Normal muscle strength, bulk and tone in upper and lower extremities.  Fine tremor of both hands.   Reflexes: 2+ in the upper and 3+ lower extremities. Plantar responses are flexor. Palmomental on the right  Sensation: Normal to light touch, pinprick, vibration, temperature, and proprioception in arms and legs. Normal graphesthesia and no extinction on DSS.  Station and Gait: Normal gait and station.    Coordination: Finger to nose test shows no dysmetria.  Rapid alternating movements are normal.  Heel to shin normal.    Diagnoses / Discussion:  81-year-old woman who has a remote history of a TIA and has had some mild memory trouble.  Her neurological examination is nonfocal and only shows very subtle early cognitive decline.  I do not think that her cognitive deficits are due to cerebral vascular disease although the MRI scans have shown progressive white matter disease.  The latter is not very severe and I doubt that she has been Binswanger's disease and the like.  It is possible she is developing early Alzheimer's disease and for that I will check some basic laboratory studies.  If the symptoms should progress then she may need further evaluation by a memory specialist and possible treatment.  For now the major issue is blood pressure control which is likely responsible for her progressive white matter lesions.  She is not on an ACE inhibitor or an ARB.  She is also on only 3 medications some of which are not particularly potent so I think there is a lot of room for improvement in blood pressure control.    Plan:   Check vitamin B12 and RPR   Blood pressure control to <130/80, she needs to follow up with Dr. Higgins or Dr. Velasquez for better BP control    Goal LDL <70-recommend high dose statins-    Serum glucose < 140   Call 911 for stroke any stroke symptoms   Follow-up MICHAEL Mccann was seen today for stroke.    Diagnoses and all orders for this visit:    Memory loss due to medical condition  -     Vitamin B12; Future  -     RPR; Future    White matter abnormality on MRI of  brain    Renovascular hypertension    Other orders  -     Cancel: Vitamin B12; Future  -     Cancel: RPR; Future        MDM  Reviewed: vitals and previous chart  Interpretation: MRI and labs       “The above HPI was documented by BHAVYA Padilla acting as a scribe for Jabier Gomes MD.”     “I confirm the above HPI documented by BHAVYA Padilla,  accurately reflected working decisions made by myself, Marty Eugene MD.”    Dictated using Dragon dictation

## 2017-06-19 ENCOUNTER — RESULTS ENCOUNTER (OUTPATIENT)
Dept: NEUROLOGY | Facility: CLINIC | Age: 82
End: 2017-06-19

## 2017-06-19 DIAGNOSIS — R41.3 MEMORY LOSS DUE TO MEDICAL CONDITION: ICD-10-CM

## 2017-07-17 ENCOUNTER — DOCUMENTATION (OUTPATIENT)
Dept: PHYSICAL THERAPY | Facility: CLINIC | Age: 82
End: 2017-07-17

## 2017-07-17 DIAGNOSIS — E55.9 VITAMIN D DEFICIENCY: ICD-10-CM

## 2017-07-17 RX ORDER — ERGOCALCIFEROL 1.25 MG/1
CAPSULE ORAL
Qty: 12 CAPSULE | Refills: 0 | Status: SHIPPED | OUTPATIENT
Start: 2017-07-17 | End: 2017-10-13 | Stop reason: SDUPTHER

## 2017-07-17 NOTE — PROGRESS NOTES
Discharge Summary  Discharge Summary from Physical Therapy Report      Dates  PT visit: 02/21-03/22/17  Number of Visits: 6    Discharge Status of Patient: See PT Note dated 03/22/17    Goals: All Met    Discharge Plan: Continue with current home exercise program as instructed    Comments     Date of Discharge 07/17/17        Lety Maurice, PT  Physical Therapist

## 2017-07-24 RX ORDER — CLOPIDOGREL BISULFATE 75 MG/1
TABLET ORAL
Qty: 90 TABLET | Refills: 0 | Status: SHIPPED | OUTPATIENT
Start: 2017-07-24 | End: 2017-10-19 | Stop reason: SDUPTHER

## 2017-10-13 DIAGNOSIS — E55.9 VITAMIN D DEFICIENCY: ICD-10-CM

## 2017-10-13 RX ORDER — ERGOCALCIFEROL 1.25 MG/1
CAPSULE ORAL
Qty: 12 CAPSULE | Refills: 0 | Status: SHIPPED | OUTPATIENT
Start: 2017-10-13 | End: 2018-01-04 | Stop reason: SDUPTHER

## 2017-10-19 RX ORDER — CLOPIDOGREL BISULFATE 75 MG/1
TABLET ORAL
Qty: 90 TABLET | Refills: 0 | Status: SHIPPED | OUTPATIENT
Start: 2017-10-19 | End: 2018-07-01 | Stop reason: SDUPTHER

## 2017-10-20 RX ORDER — ALLOPURINOL 100 MG/1
TABLET ORAL
Qty: 90 TABLET | Refills: 0 | Status: SHIPPED | OUTPATIENT
Start: 2017-10-20 | End: 2018-07-01 | Stop reason: SDUPTHER

## 2018-01-04 DIAGNOSIS — E55.9 VITAMIN D DEFICIENCY: ICD-10-CM

## 2018-01-04 RX ORDER — ERGOCALCIFEROL 1.25 MG/1
CAPSULE ORAL
Qty: 12 CAPSULE | Refills: 0 | Status: SHIPPED | OUTPATIENT
Start: 2018-01-04 | End: 2018-08-15 | Stop reason: SDUPTHER

## 2018-01-10 ENCOUNTER — HOSPITAL ENCOUNTER (OUTPATIENT)
Dept: CT IMAGING | Facility: HOSPITAL | Age: 83
Discharge: HOME | End: 2018-01-10
Admitting: NURSE PRACTITIONER

## 2018-01-10 ENCOUNTER — APPOINTMENT (OUTPATIENT)
Dept: CT IMAGING | Facility: HOSPITAL | Age: 83
End: 2018-01-10

## 2018-01-10 DIAGNOSIS — R91.8 PULMONARY NODULES/LESIONS, MULTIPLE: ICD-10-CM

## 2018-01-10 PROCEDURE — 71250 CT THORAX DX C-: CPT

## 2018-01-29 ENCOUNTER — TRANSITIONAL CARE MANAGEMENT TELEPHONE ENCOUNTER (OUTPATIENT)
Dept: FAMILY MEDICINE CLINIC | Facility: CLINIC | Age: 83
End: 2018-01-29

## 2018-01-29 ENCOUNTER — TELEPHONE (OUTPATIENT)
Dept: FAMILY MEDICINE CLINIC | Facility: CLINIC | Age: 83
End: 2018-01-29

## 2018-01-29 NOTE — TELEPHONE ENCOUNTER
----- Message from Marino Higgins MD sent at 1/29/2018 12:00 PM EST -----  She will need a TCM,  Esmer , Sudeep  Spoke with patients daughter she has a broken foot and is unable to come in right now ... They are waiting to see when they will do surgery..

## 2018-01-30 ENCOUNTER — TELEPHONE (OUTPATIENT)
Dept: FAMILY MEDICINE CLINIC | Facility: CLINIC | Age: 83
End: 2018-01-30

## 2018-01-31 ENCOUNTER — TELEPHONE (OUTPATIENT)
Dept: FAMILY MEDICINE CLINIC | Facility: CLINIC | Age: 83
End: 2018-01-31

## 2018-01-31 NOTE — TELEPHONE ENCOUNTER
Patricia, PT with [facility] called and said that there are two warnings for interactions regarding pts medications. The first one is Plavix and motrin. The second interaction is between Celexa and Zofran. Patricia needs a call back with an ok for pt to continue these meds.     [phone]

## 2018-02-12 ENCOUNTER — TELEPHONE (OUTPATIENT)
Dept: FAMILY MEDICINE CLINIC | Facility: CLINIC | Age: 83
End: 2018-02-12

## 2018-02-12 NOTE — TELEPHONE ENCOUNTER
OT called today and wanted to let you know they did not push her today because her BP was 192/98. Family did tell her that this was not an usual thing.     Mesha SCANLON   [phone]

## 2018-02-15 ENCOUNTER — TELEPHONE (OUTPATIENT)
Dept: FAMILY MEDICINE CLINIC | Facility: CLINIC | Age: 83
End: 2018-02-15

## 2018-02-15 NOTE — TELEPHONE ENCOUNTER
OT called and said that pt has had three falls recently. One on Monday trying to get out of bed and then she fell twice going to see  ankle seurgeon on Wednesday. She has increased bruising on her legs and just tired. OT had to report that she informed us about these falls.     Fer SCANLON   [phone]

## 2018-03-02 ENCOUNTER — TELEPHONE (OUTPATIENT)
Dept: SURGERY | Facility: CLINIC | Age: 83
End: 2018-03-02

## 2018-03-02 DIAGNOSIS — R91.8 MULTIPLE PULMONARY NODULES: Primary | ICD-10-CM

## 2018-03-02 NOTE — TELEPHONE ENCOUNTER
----- Message from Miri Montes sent at 3/1/2018  4:20 PM EST -----  Contact: [phone]  Patient was to be seen in February for CT follow up, but fell and broke her foot. She ended up in rehab, and then back in the hospital. She is currently admitted to [place], and has never received the results of her CT scan. Daughter Aliza Blackman called and is requesting a call with results as it will be a very long time before patient is able to follow up. Daughter is POA so it is ok to communicate results to her.     [phone]      Addendum:    Spoke with Siobhan Dueñas's daughter, Aliza, over the phone concerning patient's condition.  Patient was unable to come in for her follow-up CT chest concerning her pulmonary nodules.  Requested to call with results due to hospitalization.  She states patient denies any cough, hemoptysis, or change in her shortness of breath.  CT results as follows:    Study Result   CT CHEST WO CONTRAST-      CLINICAL HISTORY: Mid chest pain. Dyspnea. History of renal cell cancer.  Follow-up pulmonary nodules. Restaging.      TECHNIQUE: Spiral CT images were obtained through the chest without IV  contrast and were reconstructed in 3 mm thick slices.      Radiation dose reduction techniques were utilized, including automated  exposure control and exposure modulation based on body size.      COMPARISON: CT scans of the chest dated 1/18/2017 and 12/28/2015.      FINDINGS: Again seen are multiple tiny punctate nodular opacities  scattered throughout both lungs that all measure less than 4 mm in  diameter. These show no significant change when compared to the previous  CTs dating back to 12/20/2015, and are therefore consistent with benign  nodules, likely tiny granulomas. No suspicious lung masses are currently  identified. There are no parenchymal infiltrates. Curvilinear scarring  in the right posterior costophrenic sulcus is unchanged. There is no  mediastinal or hilar or axillary  lymphadenopathy. Mild coronary artery  calcification is noted. Aspiration is also noted in the aortic valve  leaflets. The chest wall is unremarkable. Images through the upper  abdomen show no significant abnormality.      IMPRESSION:  Numerous tiny punctate nodules scattered throughout both  lungs showing stability since CT imaging dating back to 12/20/2015  confirming their benign etiology. Mild coronary artery calcification and  calcification within the aortic valve leaflets. Otherwise unremarkable  CT scan of the chest. No acute abnormality is identified.      This report was finalized on 1/11/2018 1:38 PM by Dr. Storm Hanna MD.     Summary:  CT scan of chest remains stable concerning the numerous bilateral pulmonary nodules which are more than likely granulomas.  There has been no change over a 2 year period which supports benign etiology according to current guidelines.  She will no longer need to follow-up with our service unless she has any problems or concerns. Instructed to follow-up with PCP as directed.  Daughter verbalized understanding and agrees with recommendations.  Thank you for allowing us to take part in this patient's care.    Mariama Leo, APRN  [facility]  3/2/2018  11:06 PM

## 2018-03-18 ENCOUNTER — LAB REQUISITION (OUTPATIENT)
Dept: LAB | Facility: HOSPITAL | Age: 83
End: 2018-03-18

## 2018-03-18 DIAGNOSIS — Z00.00 ROUTINE GENERAL MEDICAL EXAMINATION AT A HEALTH CARE FACILITY: ICD-10-CM

## 2018-03-18 LAB
ANION GAP SERPL CALCULATED.3IONS-SCNC: 10 MMOL/L
BASOPHILS # BLD AUTO: 0.01 10*3/MM3 (ref 0–0.2)
BASOPHILS NFR BLD AUTO: 0.2 % (ref 0–1.5)
BUN BLD-MCNC: 12 MG/DL (ref 8–23)
BUN/CREAT SERPL: 12.8 (ref 7–25)
CALCIUM SPEC-SCNC: 8.8 MG/DL (ref 8.6–10.5)
CHLORIDE SERPL-SCNC: 101 MMOL/L (ref 98–107)
CO2 SERPL-SCNC: 29 MMOL/L (ref 22–29)
CREAT BLD-MCNC: 0.94 MG/DL (ref 0.57–1)
DEPRECATED RDW RBC AUTO: 46.5 FL (ref 37–54)
EOSINOPHIL # BLD AUTO: 0.1 10*3/MM3 (ref 0–0.7)
EOSINOPHIL NFR BLD AUTO: 1.7 % (ref 0.3–6.2)
ERYTHROCYTE [DISTWIDTH] IN BLOOD BY AUTOMATED COUNT: 13.9 % (ref 11.7–13)
GFR SERPL CREATININE-BSD FRML MDRD: 57 ML/MIN/1.73
GLUCOSE BLD-MCNC: 104 MG/DL (ref 65–99)
HCT VFR BLD AUTO: 30.9 % (ref 35.6–45.5)
HGB BLD-MCNC: 9.5 G/DL (ref 11.9–15.5)
IMM GRANULOCYTES # BLD: 0 10*3/MM3 (ref 0–0.03)
IMM GRANULOCYTES NFR BLD: 0 % (ref 0–0.5)
LYMPHOCYTES # BLD AUTO: 0.98 10*3/MM3 (ref 0.9–4.8)
LYMPHOCYTES NFR BLD AUTO: 16.4 % (ref 19.6–45.3)
MCH RBC QN AUTO: 28.3 PG (ref 26.9–32)
MCHC RBC AUTO-ENTMCNC: 30.7 G/DL (ref 32.4–36.3)
MCV RBC AUTO: 92 FL (ref 80.5–98.2)
MONOCYTES # BLD AUTO: 0.42 10*3/MM3 (ref 0.2–1.2)
MONOCYTES NFR BLD AUTO: 7 % (ref 5–12)
NEUTROPHILS # BLD AUTO: 4.45 10*3/MM3 (ref 1.9–8.1)
NEUTROPHILS NFR BLD AUTO: 74.7 % (ref 42.7–76)
PLATELET # BLD AUTO: 185 10*3/MM3 (ref 140–500)
PMV BLD AUTO: 8.9 FL (ref 6–12)
POTASSIUM BLD-SCNC: 4.4 MMOL/L (ref 3.5–5.2)
RBC # BLD AUTO: 3.36 10*6/MM3 (ref 3.9–5.2)
SODIUM BLD-SCNC: 140 MMOL/L (ref 136–145)
WBC NRBC COR # BLD: 5.96 10*3/MM3 (ref 4.5–10.7)

## 2018-03-18 PROCEDURE — 80048 BASIC METABOLIC PNL TOTAL CA: CPT

## 2018-03-18 PROCEDURE — 85025 COMPLETE CBC W/AUTO DIFF WBC: CPT

## 2018-05-02 ENCOUNTER — TELEPHONE (OUTPATIENT)
Dept: FAMILY MEDICINE CLINIC | Facility: CLINIC | Age: 83
End: 2018-05-02

## 2018-05-08 ENCOUNTER — HOSPITAL ENCOUNTER (OUTPATIENT)
Dept: GENERAL RADIOLOGY | Facility: HOSPITAL | Age: 83
Discharge: HOME | End: 2018-05-08
Attending: FAMILY MEDICINE | Admitting: FAMILY MEDICINE

## 2018-05-08 ENCOUNTER — OFFICE VISIT (OUTPATIENT)
Dept: FAMILY MEDICINE CLINIC | Facility: CLINIC | Age: 83
End: 2018-05-08

## 2018-05-08 VITALS
DIASTOLIC BLOOD PRESSURE: 100 MMHG | SYSTOLIC BLOOD PRESSURE: 162 MMHG | HEIGHT: 64 IN | OXYGEN SATURATION: 94 % | BODY MASS INDEX: 33.12 KG/M2 | HEART RATE: 88 BPM | WEIGHT: 194 LBS

## 2018-05-08 DIAGNOSIS — D62 ACUTE BLOOD LOSS AS CAUSE OF POSTOPERATIVE ANEMIA: ICD-10-CM

## 2018-05-08 DIAGNOSIS — Z09 HOSPITAL DISCHARGE FOLLOW-UP: ICD-10-CM

## 2018-05-08 DIAGNOSIS — N18.1 CKD (CHRONIC KIDNEY DISEASE) STAGE 1, GFR 90 ML/MIN OR GREATER: ICD-10-CM

## 2018-05-08 DIAGNOSIS — M54.6 THORACIC SPINE PAIN: ICD-10-CM

## 2018-05-08 DIAGNOSIS — S82.851D TRIMALLEOLAR FRACTURE OF ANKLE, CLOSED, RIGHT, WITH ROUTINE HEALING, SUBSEQUENT ENCOUNTER: Primary | ICD-10-CM

## 2018-05-08 PROBLEM — S82.852D TRIMALLEOLAR FRACTURE OF ANKLE, CLOSED, LEFT, WITH ROUTINE HEALING, SUBSEQUENT ENCOUNTER: Status: ACTIVE | Noted: 2018-05-08

## 2018-05-08 PROBLEM — N17.9 ACUTE KIDNEY FAILURE (HCC): Status: ACTIVE | Noted: 2018-01-26

## 2018-05-08 PROBLEM — S82.899A ANKLE FRACTURE: Status: ACTIVE | Noted: 2018-01-26

## 2018-05-08 PROBLEM — S82.899A ANKLE FRACTURE: Status: RESOLVED | Noted: 2018-01-26 | Resolved: 2018-05-08

## 2018-05-08 PROCEDURE — 99214 OFFICE O/P EST MOD 30 MIN: CPT | Performed by: FAMILY MEDICINE

## 2018-05-08 PROCEDURE — 72072 X-RAY EXAM THORAC SPINE 3VWS: CPT

## 2018-05-08 RX ORDER — ASCORBIC ACID 500 MG
500 TABLET ORAL 2 TIMES DAILY
COMMUNITY
End: 2019-04-29

## 2018-05-08 RX ORDER — SENNOSIDES 8.6 MG
650 CAPSULE ORAL
COMMUNITY
Start: 2018-02-27 | End: 2018-05-21

## 2018-05-08 RX ORDER — AMOXICILLIN 250 MG
3000 CAPSULE ORAL
COMMUNITY
End: 2019-04-29

## 2018-05-08 RX ORDER — ALBUTEROL SULFATE 90 UG/1
2 AEROSOL, METERED RESPIRATORY (INHALATION)
COMMUNITY

## 2018-05-08 NOTE — PROGRESS NOTES
Subjective   Siobhan Dueñas is a 82 y.o. female who is here for   Chief Complaint   Patient presents with   • Follow-up     Lab results    • Back Pain   • Foot Pain     broke right 04/25/2018   • Anemia   .   Hospitl follow up for ankle fracture.  Had a terrible yr so far  Fell at home, tri fracture of right ankle  No surgery  4 days in hospital for dehydration  Medicare denied inpt rehab stay  Went home with HH and PT  Then had AMS changes  Admitted again in ICU , hyponatremia  Had bad reaction to gabapentin  Multi specialist seen.  Then went into rehab at [facility]  Then somehow broke her other foot the left  Had surgery  Non weight bearing bilaterally, miserable  Now at home again with HH and PT.  sodium is low  Still anemic.    Now with T spine pain  Multi fractures  She had neg w/u for multiple myeloma in 2015, lets recheck  Labs today.    History of Present Illness   Ankle Pain: Patient complains of bilaterally ankle pain.  Onset of the symptoms was several months ago. Inciting event: injured while fall. Current symptoms include pain with inversion of the foot, pain with eversion of the foot, stiffness, swelling and worsening symptoms after a period of activity.  Aggravating symptoms: any weight bearing. Patient's overall course: gradually improving. Patient has had no prior ankle problems. Previous visits for this problem: multiple, this is a longstanding diagnosis. Last seen several months ago by hospital / surgery.  Evaluation to date: in patient.  Treatment to date: avoidance of offending activity, PT which was somewhat effective and surgery.      The following portions of the patient's history were reviewed and updated as appropriate: allergies, current medications, past medical history, past social history, past surgical history and problem list.    Review of Systems    Objective   Physical Exam   Constitutional: She appears well-developed and well-nourished.   Cardiovascular: Normal rate.     Pulmonary/Chest: Effort normal.   Musculoskeletal:        Right ankle: She exhibits decreased range of motion and swelling. Tenderness.        Thoracic back: She exhibits bony tenderness.        Left foot: There is decreased range of motion, tenderness and bony tenderness.   Nursing note and vitals reviewed.      Assessment/Plan   Siobhan was seen today for follow-up, back pain, foot pain and anemia.    Diagnoses and all orders for this visit:    Trimalleolar fracture of ankle, closed, right, with routine healing, subsequent encounter  -     Protein Elec + Interp, Serum    CKD (chronic kidney disease) stage 1, GFR 90 ml/min or greater  -     Renal Function Panel  -     Protein Elec + Interp, Serum    Acute blood loss as cause of postoperative anemia  -     CBC & Differential  -     Protein Elec + Interp, Serum    Thoracic spine pain  -     XR spine thoracic 3 vw; Future    Hospital discharge follow-up      There are no Patient Instructions on file for this visit.    Medications Discontinued During This Encounter   Medication Reason   • atorvastatin (LIPITOR) 80 MG tablet *Therapy completed   • citalopram (CeleXA) 20 MG tablet *Therapy completed   • furosemide (LASIX) 20 MG tablet *Therapy completed   • vitamin B-12 (CYANOCOBALAMIN) 2500 MCG sublingual tablet tablet *Therapy completed        Return in about 1 month (around 6/8/2018).    Dr. Marino Higgins  [place]

## 2018-05-09 LAB
ALBUMIN SERPL ELPH-MCNC: 3.3 G/DL (ref 2.9–4.4)
ALBUMIN SERPL-MCNC: 3.8 G/DL (ref 3.5–5.2)
ALBUMIN/GLOB SERPL: 0.8 {RATIO} (ref 0.7–1.7)
ALPHA1 GLOB SERPL ELPH-MCNC: 0.3 G/DL (ref 0–0.4)
ALPHA2 GLOB SERPL ELPH-MCNC: 0.8 G/DL (ref 0.4–1)
B-GLOBULIN SERPL ELPH-MCNC: 1 G/DL (ref 0.7–1.3)
BASOPHILS # BLD AUTO: 0.01 10*3/MM3 (ref 0–0.2)
BASOPHILS NFR BLD AUTO: 0.2 % (ref 0–2)
BUN SERPL-MCNC: 19 MG/DL (ref 8–23)
BUN/CREAT SERPL: 20.2 (ref 7–25)
CALCIUM SERPL-MCNC: 9.1 MG/DL (ref 8.8–10.5)
CHLORIDE SERPL-SCNC: 102 MMOL/L (ref 98–107)
CO2 SERPL-SCNC: 22.5 MMOL/L (ref 22–29)
CREAT SERPL-MCNC: 0.94 MG/DL (ref 0.57–1)
EOSINOPHIL # BLD AUTO: 0.08 10*3/MM3 (ref 0.1–0.3)
EOSINOPHIL NFR BLD AUTO: 1.7 % (ref 0–4)
ERYTHROCYTE [DISTWIDTH] IN BLOOD BY AUTOMATED COUNT: 14.7 % (ref 11.5–14.5)
GAMMA GLOB SERPL ELPH-MCNC: 1.9 G/DL (ref 0.4–1.8)
GFR SERPLBLD CREATININE-BSD FMLA CKD-EPI: 57 ML/MIN/1.73
GFR SERPLBLD CREATININE-BSD FMLA CKD-EPI: 69 ML/MIN/1.73
GLOBULIN SER CALC-MCNC: 4.1 G/DL (ref 2.2–3.9)
GLUCOSE SERPL-MCNC: 142 MG/DL (ref 65–99)
HCT VFR BLD AUTO: 37.1 % (ref 37–47)
HGB BLD-MCNC: 11.9 G/DL (ref 12–16)
IMM GRANULOCYTES # BLD: 0.01 10*3/MM3 (ref 0–0.03)
IMM GRANULOCYTES NFR BLD: 0.2 % (ref 0–0.5)
LABORATORY COMMENT REPORT: ABNORMAL
LYMPHOCYTES # BLD AUTO: 0.76 10*3/MM3 (ref 0.6–4.8)
LYMPHOCYTES NFR BLD AUTO: 15.8 % (ref 20–45)
M PROTEIN SERPL ELPH-MCNC: 1.7 G/DL
MCH RBC QN AUTO: 29.2 PG (ref 27–31)
MCHC RBC AUTO-ENTMCNC: 32.1 G/DL (ref 31–37)
MCV RBC AUTO: 90.9 FL (ref 81–99)
MONOCYTES # BLD AUTO: 0.29 10*3/MM3 (ref 0–1)
MONOCYTES NFR BLD AUTO: 6 % (ref 3–8)
NEUTROPHILS # BLD AUTO: 3.66 10*3/MM3 (ref 1.5–8.3)
NEUTROPHILS NFR BLD AUTO: 76.1 % (ref 45–70)
NRBC BLD AUTO-RTO: 0 /100 WBC (ref 0–0)
PHOSPHATE SERPL-MCNC: 3.3 MG/DL (ref 2.7–4.5)
PLATELET # BLD AUTO: 213 10*3/MM3 (ref 140–500)
POTASSIUM SERPL-SCNC: 4.5 MMOL/L (ref 3.5–5.2)
PROT PATTERN SERPL ELPH-IMP: ABNORMAL
PROT SERPL-MCNC: 7.4 G/DL (ref 6–8.5)
RBC # BLD AUTO: 4.08 10*6/MM3 (ref 4.2–5.4)
SODIUM SERPL-SCNC: 138 MMOL/L (ref 136–145)
WBC # BLD AUTO: 4.81 10*3/MM3 (ref 4.8–10.8)

## 2018-05-10 ENCOUNTER — TELEPHONE (OUTPATIENT)
Dept: FAMILY MEDICINE CLINIC | Facility: CLINIC | Age: 83
End: 2018-05-10

## 2018-05-10 DIAGNOSIS — D50.9 IRON DEFICIENCY ANEMIA, UNSPECIFIED IRON DEFICIENCY ANEMIA TYPE: Primary | ICD-10-CM

## 2018-05-10 NOTE — TELEPHONE ENCOUNTER
Pts daughter called said she does not want to wait on the blood. She would like a referral to hematology. Pt has been on the ion for 2 years and its never gone up. She really doesn't want to wait any longer.     Ok Heme consult made.    Marino Higgins MD

## 2018-05-21 ENCOUNTER — CONSULT (OUTPATIENT)
Dept: ONCOLOGY | Facility: CLINIC | Age: 83
End: 2018-05-21

## 2018-05-21 ENCOUNTER — LAB (OUTPATIENT)
Dept: OTHER | Facility: HOSPITAL | Age: 83
End: 2018-05-21

## 2018-05-21 VITALS
SYSTOLIC BLOOD PRESSURE: 162 MMHG | HEART RATE: 69 BPM | BODY MASS INDEX: 35.51 KG/M2 | HEIGHT: 62 IN | WEIGHT: 193 LBS | DIASTOLIC BLOOD PRESSURE: 95 MMHG | RESPIRATION RATE: 18 BRPM | OXYGEN SATURATION: 96 % | TEMPERATURE: 98.6 F

## 2018-05-21 DIAGNOSIS — M81.0 AGE-RELATED OSTEOPOROSIS WITHOUT CURRENT PATHOLOGICAL FRACTURE: ICD-10-CM

## 2018-05-21 DIAGNOSIS — R89.9 ABNORMAL LABORATORY TEST: Primary | ICD-10-CM

## 2018-05-21 DIAGNOSIS — D47.2 MONOCLONAL GAMMOPATHY: ICD-10-CM

## 2018-05-21 DIAGNOSIS — R71.0 PRECIPITOUS DROP IN HEMATOCRIT: ICD-10-CM

## 2018-05-21 DIAGNOSIS — D62 ACUTE BLOOD LOSS AS CAUSE OF POSTOPERATIVE ANEMIA: ICD-10-CM

## 2018-05-21 DIAGNOSIS — Z78.0 POST-MENOPAUSAL: ICD-10-CM

## 2018-05-21 DIAGNOSIS — D47.2 MONOCLONAL GAMMOPATHY: Primary | ICD-10-CM

## 2018-05-21 DIAGNOSIS — M54.6 THORACIC SPINE PAIN: ICD-10-CM

## 2018-05-21 DIAGNOSIS — S82.851D TRIMALLEOLAR FRACTURE OF ANKLE, CLOSED, RIGHT, WITH ROUTINE HEALING, SUBSEQUENT ENCOUNTER: ICD-10-CM

## 2018-05-21 LAB
ALBUMIN SERPL-MCNC: 4.2 G/DL (ref 3.5–5.2)
ALBUMIN/GLOB SERPL: 0.9 G/DL
ALP SERPL-CCNC: 73 U/L (ref 39–117)
ALT SERPL W P-5'-P-CCNC: 6 U/L (ref 1–33)
ANION GAP SERPL CALCULATED.3IONS-SCNC: 14.1 MMOL/L
AST SERPL-CCNC: 10 U/L (ref 1–32)
B2 MICROGLOB SERPL-MCNC: 3.1 MG/L (ref 0.8–2.2)
BASOPHILS # BLD AUTO: 0.02 10*3/MM3 (ref 0–0.2)
BASOPHILS NFR BLD AUTO: 0.4 % (ref 0–1.5)
BILIRUB SERPL-MCNC: 0.4 MG/DL (ref 0.1–1.2)
BUN BLD-MCNC: 22 MG/DL (ref 8–23)
BUN/CREAT SERPL: 18.8 (ref 7–25)
CALCIUM SPEC-SCNC: 9.8 MG/DL (ref 8.6–10.5)
CHLORIDE SERPL-SCNC: 100 MMOL/L (ref 98–107)
CO2 SERPL-SCNC: 24.9 MMOL/L (ref 22–29)
CREAT BLD-MCNC: 1.17 MG/DL (ref 0.57–1)
DEPRECATED RDW RBC AUTO: 49.4 FL (ref 37–54)
EOSINOPHIL # BLD AUTO: 0.1 10*3/MM3 (ref 0–0.7)
EOSINOPHIL NFR BLD AUTO: 1.9 % (ref 0.3–6.2)
ERYTHROCYTE [DISTWIDTH] IN BLOOD BY AUTOMATED COUNT: 15.1 % (ref 11.7–13)
FERRITIN SERPL-MCNC: 45.75 NG/ML (ref 13–150)
GFR SERPL CREATININE-BSD FRML MDRD: 45 ML/MIN/1.73
GLOBULIN UR ELPH-MCNC: 4.7 GM/DL
GLUCOSE BLD-MCNC: 101 MG/DL (ref 65–99)
HCT VFR BLD AUTO: 37 % (ref 35.6–45.5)
HGB BLD-MCNC: 12.2 G/DL (ref 11.9–15.5)
IMM GRANULOCYTES # BLD: 0.02 10*3/MM3 (ref 0–0.03)
IMM GRANULOCYTES NFR BLD: 0.4 % (ref 0–0.5)
IRON 24H UR-MRATE: 79 MCG/DL (ref 37–145)
IRON SATN MFR SERPL: 22 % (ref 20–50)
LYMPHOCYTES # BLD AUTO: 1.01 10*3/MM3 (ref 0.9–4.8)
LYMPHOCYTES NFR BLD AUTO: 19.2 % (ref 19.6–45.3)
MCH RBC QN AUTO: 29.5 PG (ref 26.9–32)
MCHC RBC AUTO-ENTMCNC: 33 G/DL (ref 32.4–36.3)
MCV RBC AUTO: 89.4 FL (ref 80.5–98.2)
MONOCYTES # BLD AUTO: 0.49 10*3/MM3 (ref 0.2–1.2)
MONOCYTES NFR BLD AUTO: 9.3 % (ref 5–12)
NEUTROPHILS # BLD AUTO: 3.62 10*3/MM3 (ref 1.9–8.1)
NEUTROPHILS NFR BLD AUTO: 68.8 % (ref 42.7–76)
NRBC BLD MANUAL-RTO: 0 /100 WBC (ref 0–0)
PLATELET # BLD AUTO: 152 10*3/MM3 (ref 140–500)
PMV BLD AUTO: 8.8 FL (ref 6–12)
POTASSIUM BLD-SCNC: 4.8 MMOL/L (ref 3.5–5.2)
PROT SERPL-MCNC: 8.9 G/DL (ref 6–8.5)
RBC # BLD AUTO: 4.14 10*6/MM3 (ref 3.9–5.2)
SODIUM BLD-SCNC: 139 MMOL/L (ref 136–145)
TIBC SERPL-MCNC: 367 MCG/DL
TRANSFERRIN SERPL-MCNC: 246 MG/DL (ref 200–360)
VIT B12 BLD-MCNC: >2000 PG/ML (ref 211–946)
WBC NRBC COR # BLD: 5.26 10*3/MM3 (ref 4.5–10.7)

## 2018-05-21 PROCEDURE — 82784 ASSAY IGA/IGD/IGG/IGM EACH: CPT | Performed by: INTERNAL MEDICINE

## 2018-05-21 PROCEDURE — 83921 ORGANIC ACID SINGLE QUANT: CPT | Performed by: INTERNAL MEDICINE

## 2018-05-21 PROCEDURE — 80053 COMPREHEN METABOLIC PANEL: CPT | Performed by: INTERNAL MEDICINE

## 2018-05-21 PROCEDURE — 86334 IMMUNOFIX E-PHORESIS SERUM: CPT | Performed by: INTERNAL MEDICINE

## 2018-05-21 PROCEDURE — 82232 ASSAY OF BETA-2 PROTEIN: CPT | Performed by: INTERNAL MEDICINE

## 2018-05-21 PROCEDURE — 84165 PROTEIN E-PHORESIS SERUM: CPT | Performed by: INTERNAL MEDICINE

## 2018-05-21 PROCEDURE — 99204 OFFICE O/P NEW MOD 45 MIN: CPT | Performed by: INTERNAL MEDICINE

## 2018-05-21 PROCEDURE — 84466 ASSAY OF TRANSFERRIN: CPT | Performed by: INTERNAL MEDICINE

## 2018-05-21 PROCEDURE — 83883 ASSAY NEPHELOMETRY NOT SPEC: CPT | Performed by: INTERNAL MEDICINE

## 2018-05-21 PROCEDURE — 82728 ASSAY OF FERRITIN: CPT | Performed by: INTERNAL MEDICINE

## 2018-05-21 PROCEDURE — 83540 ASSAY OF IRON: CPT | Performed by: INTERNAL MEDICINE

## 2018-05-21 PROCEDURE — 85025 COMPLETE CBC W/AUTO DIFF WBC: CPT | Performed by: INTERNAL MEDICINE

## 2018-05-21 PROCEDURE — 36415 COLL VENOUS BLD VENIPUNCTURE: CPT

## 2018-05-21 PROCEDURE — 82607 VITAMIN B-12: CPT | Performed by: INTERNAL MEDICINE

## 2018-05-21 RX ORDER — FERROUS SULFATE 325(65) MG
325 TABLET ORAL DAILY
COMMUNITY
End: 2018-08-20

## 2018-05-21 RX ORDER — HYDRALAZINE HYDROCHLORIDE 25 MG/1
50 TABLET, FILM COATED ORAL 3 TIMES DAILY
COMMUNITY
Start: 2018-05-02 | End: 2018-09-05

## 2018-05-21 NOTE — PROGRESS NOTES
Subjective states she feels fair, seen with her daughter who is worried about her progress thus far.    REASON FOR CONSULTATION: Postoperative anemia,?  Myeloma  Provide an opinion on any further workup or treatment                             REQUESTING PHYSICIAN: Marino Higgins MD    RECORDS OBTAINED:  Records of the patients history including those obtained from the referring provider were reviewed and summarized in detail.    HISTORY OF PRESENT ILLNESS:  The patient is a 82 y.o. year old female who is here for an opinion about the above issue.    History of Present Illness         The patient is an 82-year-old female with a history of essential hypertension, chronic systolic heart failure, diabetes, hyperlipidemia and COPD who experienced a try malleolar ankle fracture on the right admitted February 26 through March 02, 2018.  She evidently has sustained a fracture in January of this year.     She has a significant history that her daughter describes as being told of a right frontal bone lesion at [facility].  Review of [facility] care everywhere does include a CT scan of the brain in 2015 significant for lytic right frontal bone lesion that's nonspecific but raised the possibility of myeloma or metastatic disease.  The patient has a significant additional history of renal cell carcinoma with resection many years ago and this led to a whole body bone scan May 29, 2015 with no abnormal uptake in the right frontal bone again leaving the possibility of a myelomatous lesion or benign skull lesion.  Further history includes a review by Dr. Barnard in June 2016 for numerable tiny noncalcified pulmonary nodules that are felt to be sequela level granulomatous lung disease but no significant change seen on several CAT scans.     The patient, thereafter, head evaluations at [facility] for cardiovascular issues including adjustment of her medications, treatment for LV dysfunction admission for weakness  associated with hyponatremia in February-medication related.  This in part evidently lead to a right trimalleolar ankle fracture that required repair on February 26, later development of gabapentin and treatment-induced encephalopathy which improving medication was discontinued.  Later unexpectedly in mid May she had further pain down her left foot revealing a corner avulsion fracture at the base the middle phalanx second digit-?  Etiology and further as result of chronic back pain thoracic films revealing no acute osseous abnormality, multilevel degenerative disc disease.  There is no mention of potential myelomatous lesions.  As she is seen with her daughter May 21, 2018 they both indicate that she's been reviewed by neurology memory dysfunction  and now also struggles with a progressive dementia.     She was seen by her primary physician May 8 again for follow-up of her fracture as well as CKD stage I thank you blood loss is causing postoperative anemia.  Her studies noted March 18 included H&H of 9.5 and 30.9 with a white count of 5960, MCV of 92.0, MCH 28.3, MCHC of 30.7, platelet count of 185,000 and normal automated differential, creatinine of 0.94 with BUN of 12 and EGFR of 57.  Her follow-up H&H May 8 were 11.9 and 37.1 white count of 4810,, platelet count of 213,000 with MCV of 90.9, MCH of 29.2 and MCHC 32.1.  Additional exams revealed an albumin of 3.3, gammaglobulin of 1.9, M spike of 1.7, globulin of 4.1.  We are asked to see her May 21 for her slow recovery from postoperative anemia but recognizes her history of hyponatremia, fluid restriction and gradual recovery to her current hemoglobin of above 12 g percent suggestingcomponent hemodilution ongoing previously.  There is however her monoclonal gammopathy and a history has once again reviewed concerning this MGUS.  Past Medical History:   Diagnosis Date   • Acute kidney failure    • Anemia    • Anxiety    • Arthritis    • Cancer 2004    Left kidney  tumor   • Cataract    • CHF (congestive heart failure)    • Chronic kidney disease    • COPD (chronic obstructive pulmonary disease)    • Diabetes mellitus     Type 2   • GERD (gastroesophageal reflux disease)    • H/O Chronic systolic heart failure 02/2018   • H/O Gout    • H/O LV dysfunction    • H/O NICM (nonischemic cardiomyopathy) 2017   • H/O PONV (postoperative nausea and vomiting)    • H/O Syncope and collapse 2015   • H/O Valvular disease 2015    Mitral/Tricuspid   • Hyperlipidemia    • Hypertension    • Hyponatremia 02/2018   • LBBB (left bundle branch block)    • Liver disease    • Osteoporosis    • Pacemaker    • Shortness of breath    • Stroke     Memory loss   • TIA (transient ischemic attack)         Past Surgical History:   Procedure Laterality Date   • CARDIAC CATHETERIZATION      mild dz, no stents   • CATARACT EXTRACTION     • COLONOSCOPY     • JOINT REPLACEMENT     • KIDNEY SURGERY     • NEPHRECTOMY Left 11/2004   • ORIF ANKLE FRACTURE Right 02/2018    Trimalleolar; delayed healing   • PACEMAKER IMPLANTATION  05/29/2015    JRD Communicationtronic dual chamber/Dr. Weber        Current Outpatient Prescriptions on File Prior to Visit   Medication Sig Dispense Refill   • albuterol (PROVENTIL HFA;VENTOLIN HFA) 108 (90 Base) MCG/ACT inhaler Inhale 2 puffs.     • allopurinol (ZYLOPRIM) 100 MG tablet TAKE ONE TABLET BY MOUTH DAILY 90 tablet 0   • carvedilol (COREG) 25 MG tablet Take 25 mg by mouth 2 (Two) Times a Day With Meals.     • clopidogrel (PLAVIX) 75 MG tablet TAKE TAKE ONE TABLET BY MOUTH DAILY 90 tablet 0   • Cobalamine Combinations (B-12) 100-5000 MCG sublingual tablet Place 3,000 mcg under the tongue.     • famotidine (PEPCID) 20 MG tablet Take 20 mg by mouth Daily.     • vitamin C (ASCORBIC ACID) 500 MG tablet Take 500 mg by mouth 2 (Two) Times a Day.     • vitamin D (ERGOCALCIFEROL) 47716 units capsule capsule TAKE ONE CAPSULE BY MOUTH ONCE WEEKLY 12 capsule 0   • [DISCONTINUED] acetaminophen (TYLENOL)  650 MG 8 hr tablet Take 650 mg by mouth.     • [DISCONTINUED] Blood Glucose Calibration (ACCU-CHEK INSTANT CONTROL) liquid Accu-Chek Instant Control In Vitro Liquid; Patient Sig: Accu-Chek Instant Control In Vitro Liquid USE AS DIRECTED.; 1; 11; 20-Jul-2015; Active     • [DISCONTINUED] Blood Glucose Monitoring Suppl (SSEV BLOOD GLUCOSE) W/DEVICE kit      • [DISCONTINUED] hydrALAZINE (APRESOLINE) 10 MG tablet Take 30 mg by mouth 3 (Three) Times a Day.     • [DISCONTINUED] Multiple Vitamin (MULTIVITAMINS PO) Take 1 tablet by mouth Daily.     • [DISCONTINUED] ondansetron (ZOFRAN) 4 MG tablet TAKE ONE TABLET BY MOUTH EVERY 4 HOURS AS NEEDED FOR NAUSEA 30 tablet 0     No current facility-administered medications on file prior to visit.         ALLERGIES:    Allergies   Allergen Reactions   • Hydrocodone Nausea And Vomiting and Swelling     Mouth swelling   • Hydromorphone Swelling   • Morphine And Related Nausea And Vomiting   • Oxycodone Swelling   • Tramadol Nausea And Vomiting   • Atenolol    • Codeine    • Diovan [Valsartan] Dizziness and Cough     Other reaction(s): Cough, Dizziness        Social History     Social History   • Marital status:    • Number of children: 2     Occupational History   •  Retired     Social History Main Topics   • Smoking status: Never Smoker   • Smokeless tobacco: Never Used   • Alcohol use No   • Drug use: No   • Sexual activity: Not Currently     Partners: Male     Other Topics Concern   • Not on file        Family History   Problem Relation Age of Onset   • Heart disease Mother    • Hypertension Mother    • Heart disease Father    • Hypertension Father    • Cancer Sister    • Dementia Sister    • Hypertension Sister    • Liver disease Sister    • Stroke Sister    • Early death Sister         Review of Systems   Gen.: Variable weight loss, chills, night sweats, fatigue, cold sensitivity  ENT: Progressive deafness bilaterally  Respiratory: Chronic cough and shortness of  "breath  Gastrointestinal: Poor appetite, chronic indigestion, periodic diarrhea  Genitourinary: Increasing nocturia  Skeletal: Chronic back pain, inflamed joints for many years  Neurologic: Headaches, dizziness, difficulty in ambulation    Objective     Vitals:    05/21/18 1404   BP: 162/95   Pulse: 69   Resp: 18   Temp: 98.6 °F (37 °C)   TempSrc: Oral   SpO2: 96%   Weight: 87.5 kg (193 lb)   Height: 157 cm (61.81\")  Comment: NEW PT   PainSc:   6   PainLoc: Back     Current Status 5/21/2018   ECOG score 1       Physical Exam    Constitutional: She appears well-developed and well-nourished.   Cardiovascular: Normal rate.    Pulmonary/Chest: Effort normal.  Clear bilaterally to auscultation and percussion   Cardiovascular: Normal rate regular rhythm without murmur or gallop  Abdomen: Soft, nontender, moderately obese without hepatosplenomegaly or ascites   Musculoskeletal: Right ankle: She exhibits decreased range of motion and swelling. Tenderness. Thoracic back: She exhibits bony tenderness.  Left foot: There is decreased range of motion, tenderness and bony tenderness.     RECENT LABS:  Hematology WBC   Date Value Ref Range Status   05/21/2018 5.26 4.50 - 10.70 10*3/mm3 Final     RBC   Date Value Ref Range Status   05/21/2018 4.14 3.90 - 5.20 10*6/mm3 Final   05/08/2018 4.08 (L) 4.20 - 5.40 10*6/mm3 Final     Hemoglobin   Date Value Ref Range Status   05/21/2018 12.2 11.9 - 15.5 g/dL Final     Hematocrit   Date Value Ref Range Status   05/21/2018 37.0 35.6 - 45.5 % Final     Platelets   Date Value Ref Range Status   05/21/2018 152 140 - 500 10*3/mm3 Final          Assessment/Plan    .  The patient is an 82-year-old female with a somewhat complex history including hypertension, systolic heart failure, diabetes, hyperlipidemia, COPD, slowly progressive memory dysfunction and recent development of further complications including trimalleolar fracture on the right with admission later in February to March for repair at " which time she was experiencing hyponatremia from Celexa use.  After surgery she was transferred to a nursing facility where this medication, unfortunately, continued as did her hyponatremia and there is been some time spent trying to improve this issue for her.  Following this without, she injured her left foot and had sustained an avulsion fracture at the base of the middle phalanx.  This too has been addressed orthopedically and she is slowly recovering.  All of this is somewhat complicated by her neurologic decline with memory dysfunction and apparent dementia.    As she was slow in recovery for her anemia status after her most recent surgery we are asked to see her in consultation finding that she has improved to nearly normal hemoglobin and hematocrit levels but does have a monoclonal gammopathy with a history, as described, of a potential skull lesion in 2015.  Her subsequent plain films do not demonstrate other lytic lesions including recently performed thoracic spine films.  We've discussed this may have occurred on the basis of progressive osteoporosis rather than myeloma and that the patient's anemia may well be improved as result of previous hemodilution-secondary to medication induced SIADH.  After considerable discussion with the patient and her daughter at this point we'll plan:  *Assess her anemia further with B12, MMA, iron profile, ferritin, CMP assessment  *Proceed to review her paraproteins with MARISSA, PE and serum free light chains  *DEXA bone scan within the next 5-7 days  *Return for assessment on these studies are completed and additional recommendations as needed.

## 2018-05-22 ENCOUNTER — DOCUMENTATION (OUTPATIENT)
Dept: ONCOLOGY | Facility: CLINIC | Age: 83
End: 2018-05-22

## 2018-05-22 ENCOUNTER — TELEPHONE (OUTPATIENT)
Dept: FAMILY MEDICINE CLINIC | Facility: CLINIC | Age: 83
End: 2018-05-22

## 2018-05-22 NOTE — TELEPHONE ENCOUNTER
Natalee with Home health called and would like a verbal order for Kidney function.     [phone]
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
## 2018-05-22 NOTE — PROGRESS NOTES
"Pt was seen yesterday at [facility] following her initial medical oncology consultation with Dr. Lilly for [facility]. The pt was accompanied by her supportive daughter/POA, Aliza Blackman (phone is [phone]). The pt's daughter completed the Distress Questionnaire on behalf of the pt, with the assistance of the pt, along with the PHQ 9 = 17 and the MILAGROS 7 = 17. The pt scored 10/10 on the DQ, marking a host of concerns in all areas except for spiritual/Amish. OSW completed a psychosocial assessment.     The pt is an 82 year old  female who resides in [place] with her 22 year old grandson. The pt has experienced a host of medical issues in the recent past, including rehab for a fractured foot. Currently, she is receiving OT/PT home health services from [facility]. HH began 3 weeks ago following a 33 day admission to [facility] following foot surgery.     The pt presented as alert, friendly and cooperative. According to Aliza, the pt is experiencing dementia which has interfered with her functioning and which has led to significant issues related to finances. The pt is using a walker currently and does not often leave her home with the exception of attending scheduled medical appointments. Aliza is the pt's primary caregiver, coordinates all the pt's appointments and makes financial and medical decisions on the pt's behalf.    The pt had taken celexa in the past (for \"about two years\" for an ongoing depression which she states began many years ago. When OSW began making inquiries about the pt's emotional concerns/issues, the pt became tearful. She  in 1985 and stated \"I still love him\". Her former  passed away in 2015 of colon cancer, about a year after being diagnosed. The pt moved her former  into her home and served as his caregiver as he had no one else do so. 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 The pt has one " "daughter and one son. There appears to be a challenged relationship with her son as he is not involved and is no longer in the pt's will. According to the pt's daughter, my brother \"doesn't help\". He has a different father than Aliza and is 22 years older than Aliza. He resides in [place].    The pt is currently not taking any medication for depression. Aliza informed OSW that her mother is \"verbally combative\" and irritable. Aliza stated that her mother is challenging behaviorally, typically related to following insturctoins by OT/PT and being active in the home. Aliza stated that recently, the  OT tested the pt for dementia and plans \"to refer her to a neurologist\". We discussed a referral to [facility]. OSW checked with Dr. Lilly who agreed that this is an appropriate referral.     The pt does not have a living will. We discussed needing two health care surrogates; Aliza stated there is only one, which is she. We discussed filling out the Living Will form and adding an alternate health care surrogate.     OSW provided her contact information and remains available as needs arise. OSW sent a new referral request to Rena Rebolledo NP.    Rand Fermin, [facility]  Oncology Social Worker      "

## 2018-05-23 ENCOUNTER — HOSPITAL ENCOUNTER (OUTPATIENT)
Dept: BONE DENSITY | Facility: HOSPITAL | Age: 83
Discharge: HOME | End: 2018-05-23
Attending: INTERNAL MEDICINE | Admitting: INTERNAL MEDICINE

## 2018-05-23 DIAGNOSIS — Z78.0 POST-MENOPAUSAL: ICD-10-CM

## 2018-05-23 DIAGNOSIS — M81.0 AGE-RELATED OSTEOPOROSIS WITHOUT CURRENT PATHOLOGICAL FRACTURE: ICD-10-CM

## 2018-05-23 LAB
ALBUMIN SERPL-MCNC: 3.8 G/DL (ref 2.9–4.4)
ALBUMIN/GLOB SERPL: 0.9 {RATIO} (ref 0.7–1.7)
ALPHA1 GLOB FLD ELPH-MCNC: 0.3 G/DL (ref 0–0.4)
ALPHA2 GLOB SERPL ELPH-MCNC: 0.8 G/DL (ref 0.4–1)
B-GLOBULIN SERPL ELPH-MCNC: 1.1 G/DL (ref 0.7–1.3)
GAMMA GLOB SERPL ELPH-MCNC: 2.1 G/DL (ref 0.4–1.8)
GLOBULIN SER CALC-MCNC: 4.3 G/DL (ref 2.2–3.9)
IGA SERPL-MCNC: 88 MG/DL (ref 64–422)
IGG SERPL-MCNC: 2069 MG/DL (ref 700–1600)
IGM SERPL-MCNC: 41 MG/DL (ref 26–217)
INTERPRETATION SERPL IEP-IMP: ABNORMAL
KAPPA LC SERPL-MCNC: 24.1 MG/L (ref 3.3–19.4)
KAPPA LC/LAMBDA SER: 1.62 {RATIO} (ref 0.26–1.65)
LAMBDA LC FREE SERPL-MCNC: 14.9 MG/L (ref 5.7–26.3)
Lab: ABNORMAL
M-SPIKE: 1.8 G/DL
PROT SERPL-MCNC: 8.1 G/DL (ref 6–8.5)

## 2018-05-23 PROCEDURE — 77080 DXA BONE DENSITY AXIAL: CPT

## 2018-05-25 ENCOUNTER — TELEPHONE (OUTPATIENT)
Dept: PSYCHIATRY | Facility: HOSPITAL | Age: 83
End: 2018-05-25

## 2018-05-25 LAB — METHYLMALONATE SERPL-SCNC: 152 NMOL/L (ref 0–378)

## 2018-05-25 NOTE — TELEPHONE ENCOUNTER
[facility]    I have spoken to Dr. Lilly who agrees pt needs may be better met in geriatric psych setting; I have called  [facility] and left message requesting return call for apt availability.     Call placed to pt daughter, Aliza, regarding referral to SOS clinic per Dr. Lilly/ Rand Fermin, OSW. Message left with contact info and request for return call to discuss further plan of care.

## 2018-05-30 ENCOUNTER — TELEPHONE (OUTPATIENT)
Dept: PSYCHIATRY | Facility: HOSPITAL | Age: 83
End: 2018-05-30

## 2018-05-30 NOTE — TELEPHONE ENCOUNTER
[facility]    Supportive call placed to pt daughter regarding referral to SOS clinic. Discussed goals of care including desire for understanding of dx given decline in patient mental health over past 7 years. Reported hx MoCA as conducted per home health OT of 13/20. Reports noted decline in memory, facial recognition, and anxiety when alone. Has also being misplacing items in inappropraite places and dtr reports irritability with personality change. Denies family hx of dementia. Hx of neurology consult related to questionable TIA with stroke testing, but has never had official neuropsych testing for dementia. In conversation with daughter, I have made assessment of patient behavioral health needs given reports of historical cognitive decline with limited cognitive workup. Family looking for guidance for control of dementia. Plan to attempt referral to  geriatric psychiatry. If unable to do this in timely manner, I am available to see pt, make recommendation, and facilitate subsequent referral.     .

## 2018-06-01 ENCOUNTER — OFFICE VISIT (OUTPATIENT)
Dept: ONCOLOGY | Facility: CLINIC | Age: 83
End: 2018-06-01

## 2018-06-01 ENCOUNTER — APPOINTMENT (OUTPATIENT)
Dept: LAB | Facility: HOSPITAL | Age: 83
End: 2018-06-01

## 2018-06-01 VITALS
DIASTOLIC BLOOD PRESSURE: 90 MMHG | OXYGEN SATURATION: 97 % | HEART RATE: 80 BPM | SYSTOLIC BLOOD PRESSURE: 150 MMHG | HEIGHT: 62 IN | RESPIRATION RATE: 18 BRPM | WEIGHT: 193.2 LBS | BODY MASS INDEX: 35.55 KG/M2 | TEMPERATURE: 98 F

## 2018-06-01 DIAGNOSIS — D47.2 MONOCLONAL GAMMOPATHY: Primary | ICD-10-CM

## 2018-06-01 DIAGNOSIS — C64.9 RENAL CELL CARCINOMA, UNSPECIFIED LATERALITY (HCC): Primary | ICD-10-CM

## 2018-06-01 LAB
BASOPHILS # BLD AUTO: 0.03 10*3/MM3 (ref 0–0.1)
BASOPHILS NFR BLD AUTO: 0.6 % (ref 0–1.1)
DEPRECATED RDW RBC AUTO: 48.2 FL (ref 37–49)
EOSINOPHIL # BLD AUTO: 0.12 10*3/MM3 (ref 0–0.36)
EOSINOPHIL NFR BLD AUTO: 2.3 % (ref 1–5)
ERYTHROCYTE [DISTWIDTH] IN BLOOD BY AUTOMATED COUNT: 14.6 % (ref 11.7–14.5)
HCT VFR BLD AUTO: 38.6 % (ref 34–45)
HGB BLD-MCNC: 12.7 G/DL (ref 11.5–14.9)
IMM GRANULOCYTES # BLD: 0.01 10*3/MM3 (ref 0–0.03)
IMM GRANULOCYTES NFR BLD: 0.2 % (ref 0–0.5)
LYMPHOCYTES # BLD AUTO: 1.07 10*3/MM3 (ref 1–3.5)
LYMPHOCYTES NFR BLD AUTO: 20.8 % (ref 20–49)
MCH RBC QN AUTO: 29.6 PG (ref 27–33)
MCHC RBC AUTO-ENTMCNC: 32.9 G/DL (ref 32–35)
MCV RBC AUTO: 90 FL (ref 83–97)
MONOCYTES # BLD AUTO: 0.41 10*3/MM3 (ref 0.25–0.8)
MONOCYTES NFR BLD AUTO: 8 % (ref 4–12)
NEUTROPHILS # BLD AUTO: 3.51 10*3/MM3 (ref 1.5–7)
NEUTROPHILS NFR BLD AUTO: 68.1 % (ref 39–75)
NRBC BLD MANUAL-RTO: 0 /100 WBC (ref 0–0)
PLATELET # BLD AUTO: 180 10*3/MM3 (ref 150–375)
PMV BLD AUTO: 8.8 FL (ref 8.9–12.1)
RBC # BLD AUTO: 4.29 10*6/MM3 (ref 3.9–5)
WBC NRBC COR # BLD: 5.15 10*3/MM3 (ref 4–10)

## 2018-06-01 PROCEDURE — 36416 COLLJ CAPILLARY BLOOD SPEC: CPT | Performed by: INTERNAL MEDICINE

## 2018-06-01 PROCEDURE — 85025 COMPLETE CBC W/AUTO DIFF WBC: CPT | Performed by: INTERNAL MEDICINE

## 2018-06-01 PROCEDURE — 99214 OFFICE O/P EST MOD 30 MIN: CPT | Performed by: INTERNAL MEDICINE

## 2018-06-01 NOTE — PROGRESS NOTES
Subjective states she continues to feel fair, expecting to be seen by the geriatric unit at [facility] the next 4 weeks.    REASON FOR CONSULTATION: Postoperative anemia,?  Myeloma  Provide an opinion on any further workup or treatment                             REQUESTING PHYSICIAN: Marino Higgins MD      History of Present Illness         The patient is an 82-year-old female with a history of essential hypertension, chronic systolic heart failure, diabetes, hyperlipidemia and COPD who experienced a try malleolar ankle fracture on the right admitted February 26 through March 02, 2018.  She evidently has sustained a fracture in January of this year.     She has a significant history that her daughter describes as being told of a right frontal bone lesion at [facility].  Review of [facility] care everywhere does include a CT scan of the brain in 2015 significant for lytic right frontal bone lesion that's nonspecific but raised the possibility of myeloma or metastatic disease.  The patient has a significant additional history of renal cell carcinoma with resection many years ago and this led to a whole body bone scan May 29, 2015 with no abnormal uptake in the right frontal bone again leaving the possibility of a myelomatous lesion or benign skull lesion.  Further history includes a review by Dr. Barnard in June 2016 for numerable tiny noncalcified pulmonary nodules that are felt to be sequela level granulomatous lung disease but no significant change seen on several CAT scans.     The patient, thereafter, head evaluations at [facility] for cardiovascular issues including adjustment of her medications, treatment for LV dysfunction admission for weakness associated with hyponatremia in February-medication related.  This in part evidently lead to a right trimalleolar ankle fracture that required repair on February 26, later development of gabapentin and treatment-induced encephalopathy which improving  medication was discontinued.  Later unexpectedly in mid May she had further pain down her left foot revealing a corner avulsion fracture at the base the middle phalanx second digit-?  Etiology and further as result of chronic back pain thoracic films revealing no acute osseous abnormality, multilevel degenerative disc disease.  There is no mention of potential myelomatous lesions.  As she is seen with her daughter May 21, 2018 they both indicate that she's been reviewed by neurology memory dysfunction  and now also struggles with a progressive dementia.     She was seen by her primary physician May 8 again for follow-up of her fracture as well as CKD stage I thank you blood loss is causing postoperative anemia.  Her studies noted March 18 included H&H of 9.5 and 30.9 with a white count of 5960, MCV of 92.0, MCH 28.3, MCHC of 30.7, platelet count of 185,000 and normal automated differential, creatinine of 0.94 with BUN of 12 and EGFR of 57.  Her follow-up H&H May 8 were 11.9 and 37.1 white count of 4810,, platelet count of 213,000 with MCV of 90.9, MCH of 29.2 and MCHC 32.1.  Additional exams revealed an albumin of 3.3, gammaglobulin of 1.9, M spike of 1.7, globulin of 4.1.  We are asked to see her May 21 for her slow recovery from postoperative anemia but recognizes her history of hyponatremia, fluid restriction and gradual recovery to her current hemoglobin of above 12 g percent suggestingcomponent hemodilution ongoing previously.  There is however her monoclonal gammopathy and a history has once again reviewed concerning this MGUS.     The patient underwent a series of studies including BUN and creatinine of 22 and 1.17, total protein of 8.9, sodium of 139, ferritin of 45.7, iron is 79, TIBC of 367, 22% saturation, B12 greater than 2000, MMA of 152 beta-2 microglobulin 3.1 and paraprotein studies-IgG of 2069, IgA of 88, IgM 41, M spike of 1.8 and light chains with free light kappa 24.1, free lambda light chain  14.9 with a ratio of 1.62.  These studies would be consistent with a monoclonal gammopathy of unknown significance.  We discussed these findings in great detail and the patient is seen June 01, 2018.  She and her daughter are quite concerned about her cognitive decline and discusses with supportive oncology- Rena Rebolledo-with plans for the patient to be seen at the [facility] in the next 4 weeks.  Past Medical History:   Diagnosis Date   • Acute kidney failure    • Anemia    • Anxiety    • Arthritis    • Asthma    • Cancer 2004    Left kidney tumor   • Cataract    • CHF (congestive heart failure)    • Chronic kidney disease    • COPD (chronic obstructive pulmonary disease)    • Diabetes mellitus     Type 2   • GERD (gastroesophageal reflux disease)    • H/O Chronic systolic heart failure 02/2018   • H/O Gout    • H/O LV dysfunction    • H/O NICM (nonischemic cardiomyopathy) 2017   • H/O PONV (postoperative nausea and vomiting)    • H/O Syncope and collapse 2015   • H/O Valvular disease 2015    Mitral/Tricuspid   • Hyperlipidemia    • Hypertension    • Hyponatremia 02/2018   • Infectious viral hepatitis    • LBBB (left bundle branch block)    • Liver disease    • Myeloma    • Osteoporosis    • Pacemaker    • Shortness of breath    • Stroke     Memory loss   • TIA (transient ischemic attack)         Past Surgical History:   Procedure Laterality Date   • CARDIAC CATHETERIZATION      mild dz, no stents   • CATARACT EXTRACTION     • CHOLECYSTECTOMY     • COLONOSCOPY  12/2005    Polyps x4   • EYE SURGERY      Implants   • HEMORRHOIDECTOMY     • JOINT REPLACEMENT Right 2011   • KIDNEY SURGERY     • NEPHRECTOMY Left 11/2004   • ORIF ANKLE FRACTURE Right 02/2018    Trimalleolar; delayed healing   • PACEMAKER IMPLANTATION  05/29/2015    Medtronic dual chamber/Dr. Weber        Current Outpatient Prescriptions on File Prior to Visit   Medication Sig Dispense Refill   • Acetaminophen (TYLENOL 8 HOUR PO) Take 650  mg by mouth 2 (Two) Times a Day. 1/2 TAB    BID     • albuterol (PROVENTIL HFA;VENTOLIN HFA) 108 (90 Base) MCG/ACT inhaler Inhale 2 puffs.     • allopurinol (ZYLOPRIM) 100 MG tablet TAKE ONE TABLET BY MOUTH DAILY 90 tablet 0   • carvedilol (COREG) 25 MG tablet Take 25 mg by mouth 2 (Two) Times a Day With Meals.     • clopidogrel (PLAVIX) 75 MG tablet TAKE TAKE ONE TABLET BY MOUTH DAILY 90 tablet 0   • Cobalamine Combinations (B-12) 100-5000 MCG sublingual tablet Place 3,000 mcg under the tongue.     • famotidine (PEPCID) 20 MG tablet Take 20 mg by mouth Daily.     • ferrous sulfate 325 (65 FE) MG tablet Take 325 mg by mouth Daily.     • hydrALAZINE (APRESOLINE) 25 MG tablet Take 25 mg by mouth 3 (Three) Times a Day.     • Multiple Vitamins-Minerals (CENTRUM PO) Take  by mouth Daily.     • SODIUM CHLORIDE PO Take 1 g by mouth 3 (Three) Times a Day.     • vitamin C (ASCORBIC ACID) 500 MG tablet Take 500 mg by mouth 2 (Two) Times a Day.     • vitamin D (ERGOCALCIFEROL) 92957 units capsule capsule TAKE ONE CAPSULE BY MOUTH ONCE WEEKLY 12 capsule 0     No current facility-administered medications on file prior to visit.         ALLERGIES:    Allergies   Allergen Reactions   • Hydrocodone Nausea And Vomiting and Swelling     Mouth swelling   • Hydromorphone Swelling   • Morphine And Related Nausea And Vomiting   • Oxycodone Swelling   • Tramadol Nausea And Vomiting   • Atenolol    • Codeine    • Diovan [Valsartan] Dizziness and Cough     Other reaction(s): Cough, Dizziness        Social History     Social History   • Marital status:    • Number of children: 2   • Years of education: High School     Occupational History   •  Retired     Social History Main Topics   • Smoking status: Never Smoker   • Smokeless tobacco: Never Used   • Alcohol use No   • Drug use: No   • Sexual activity: Not Currently     Partners: Male     Other Topics Concern   • Not on file        Family History   Problem Relation Age of Onset   •  "Heart disease Mother    • Hypertension Mother    • Heart disease Father    • Hypertension Father    • Ovarian cancer Sister 50   • Dementia Sister    • Hypertension Sister    • Heart disease Sister    • Liver disease Sister    • Drug abuse Sister    • Stroke Sister    • Early death Sister    • Heart disease Brother    • Hypertension Brother    • Diabetes Other    • Anemia Other         Review of Systems   Gen.: Variable weight loss, chills, night sweats, fatigue, cold sensitivity  ENT: Progressive deafness bilaterally  Respiratory: Chronic cough and shortness of breath  Gastrointestinal: Poor appetite, chronic indigestion, periodic diarrhea  Genitourinary: Increasing nocturia  Skeletal: Chronic back pain, inflamed joints for many years  Neurologic: Headaches, dizziness, difficulty in ambulation    Objective     Vitals:    06/01/18 1510   BP: 150/90   Pulse: 80   Resp: 18   Temp: 98 °F (36.7 °C)   SpO2: 97%  Comment: at rest   Weight: 87.6 kg (193 lb 3.2 oz)   Height: 157 cm (61.81\")   PainSc:   7   PainLoc: Back  Comment: back pain and body aches     Current Status 6/1/2018   ECOG score 1       Physical Exam  repeat physical exam June 01, 2018  Constitutional: She appears well-developed and well-nourished.   Cardiovascular: Normal rate.    Pulmonary/Chest: Effort normal.  Clear bilaterally to auscultation and percussion   Cardiovascular: Normal rate regular rhythm without murmur or gallop  Abdomen: Soft, nontender, moderately obese without hepatosplenomegaly or ascites   Musculoskeletal: Right ankle: She exhibits decreased range of motion and swelling. Tenderness. Thoracic back: She exhibits bony tenderness.  Left foot: There is decreased range of motion, tenderness and bony tenderness.     RECENT LABS:  Hematology WBC   Date Value Ref Range Status   06/01/2018 5.15 4.00 - 10.00 10*3/mm3 Final     RBC   Date Value Ref Range Status   06/01/2018 4.29 3.90 - 5.00 10*6/mm3 Final   05/08/2018 4.08 (L) 4.20 - 5.40 " 10*6/mm3 Final     Hemoglobin   Date Value Ref Range Status   06/01/2018 12.7 11.5 - 14.9 g/dL Final     Hematocrit   Date Value Ref Range Status   06/01/2018 38.6 34.0 - 45.0 % Final     Platelets   Date Value Ref Range Status   06/01/2018 180 150 - 375 10*3/mm3 Final          Assessment/Plan    .  The patient is an 82-year-old female with a somewhat complex history including hypertension, systolic heart failure, diabetes, hyperlipidemia, COPD, slowly progressive memory dysfunction and recent development of further complications including trimalleolar fracture on the right with admission later in February to March for repair at which time she was experiencing hyponatremia from Celexa use.  After surgery she was transferred to a nursing facility where this medication, unfortunately, continued as did her hyponatremia and there is been some time spent trying to improve this issue for her.  Following this without, she injured her left foot and had sustained an avulsion fracture at the base of the middle phalanx.  This too has been addressed orthopedically and she is slowly recovering.  All of this is somewhat complicated by her neurologic decline with memory dysfunction and apparent dementia.    As she was slow in recovery for her anemia status after her most recent surgery we are asked to see her in consultation finding that she has improved to nearly normal hemoglobin and hematocrit levels but does have a monoclonal gammopathy with a history, as described, of a potential skull lesion in 2015.  Her subsequent plain films do not demonstrate other lytic lesions including recently performed thoracic spine films.  We've discussed this may have occurred on the basis of progressive osteoporosis rather than myeloma and that the patient's anemia may well be improved as result of previous hemodilution-secondary to medication induced SIADH.  After considerable discussion with the patient and her daughter we went on to assess as  described above revealing evidence of osteopenia and MGUS.  As the patient is seen June 1 she is stable hematologically without progression of anemia further.  After additional 30 minutes discussion we plan:  *Follow-up with geriatrics in the next 4 weeks  *Reassessment in 11 weeks with bone survey, laboratory studies-repeat paraprotein studies  *M.D. assessment in 12 weeks  *Ongoing back pain recognized as possibly disc disease progressed-?  Further repeat orthopedic assessment if worsens

## 2018-06-06 ENCOUNTER — TELEPHONE (OUTPATIENT)
Dept: PSYCHIATRY | Facility: HOSPITAL | Age: 83
End: 2018-06-06

## 2018-06-06 DIAGNOSIS — R41.89 COGNITIVE IMPAIRMENT: Primary | ICD-10-CM

## 2018-06-06 NOTE — TELEPHONE ENCOUNTER
Supportive Oncology Services    Dtr aware of referral to [facility]

## 2018-06-06 NOTE — PROGRESS NOTES
Supportive Oncology Services    Supportive call placed to pt daughter, POA regarding follow up care for pt psychosocial sx. Daughter states [facility] is not currently taking new patients. I have discussed with [facility] who encouraged me to place referral so their team may review the chart and connect patient to Dr. Chaidez or Dr. Trammell, both of whom specialize in dementia. I have made referral and shared this information with patient daughter. Will continue to be available for coordination support if needed.    Rena Rebolledo, APRN  [phone]

## 2018-06-06 NOTE — TELEPHONE ENCOUNTER
Supportive Oncology Services    Call placed to pt dtr and POA regarding referral to  geriatric psychiatry clinic. Message left with contact info and request for return call, hopefully to confirm she has been able to make apt with .

## 2018-06-08 ENCOUNTER — OFFICE VISIT (OUTPATIENT)
Dept: FAMILY MEDICINE CLINIC | Facility: CLINIC | Age: 83
End: 2018-06-08

## 2018-06-08 VITALS
BODY MASS INDEX: 36.25 KG/M2 | WEIGHT: 192 LBS | HEART RATE: 90 BPM | DIASTOLIC BLOOD PRESSURE: 106 MMHG | OXYGEN SATURATION: 97 % | HEIGHT: 61 IN | SYSTOLIC BLOOD PRESSURE: 180 MMHG

## 2018-06-08 DIAGNOSIS — I15.0 RENOVASCULAR HYPERTENSION: ICD-10-CM

## 2018-06-08 DIAGNOSIS — F39 MOOD DISORDER (HCC): ICD-10-CM

## 2018-06-08 DIAGNOSIS — E87.1 HYPONATREMIA: ICD-10-CM

## 2018-06-08 DIAGNOSIS — S99.922S: Primary | ICD-10-CM

## 2018-06-08 PROBLEM — N17.9 ACUTE KIDNEY FAILURE (HCC): Status: RESOLVED | Noted: 2018-01-26 | Resolved: 2018-06-08

## 2018-06-08 PROCEDURE — 99213 OFFICE O/P EST LOW 20 MIN: CPT | Performed by: FAMILY MEDICINE

## 2018-06-08 RX ORDER — BUPROPION HYDROCHLORIDE 150 MG/1
150 TABLET ORAL EVERY MORNING
Qty: 30 TABLET | Refills: 5 | Status: SHIPPED | OUTPATIENT
Start: 2018-06-08 | End: 2019-01-01 | Stop reason: SDUPTHER

## 2018-06-08 NOTE — PROGRESS NOTES
"  Subjective   Siobhan RICKY Dueñas is a 82 y.o. female who is here for   Chief Complaint   Patient presents with   • Follow-up   • Ankle Injury   • Memory Loss   • Anemia   .     History of Present Illness   Right Tri fracture is healing well  Has in home pt  No brace, is full weight bearing with a walker  No further falls.    Seen by Onc,  Post of anemia is resolved  So use up last iron pills at home, then stop    Sodium is better  Ok to taper off sodium tabs.  Recheck bmp in a month    Daughter would like a dementia/stress med started  Celexa stopped due to low sodium levels    CKD 1 is stable, she has 1 kidney.      The following portions of the patient's history were reviewed and updated as appropriate: allergies, current medications, past family history, past medical history, past social history, past surgical history and problem list.    Review of Systems  Vitals:    06/08/18 1324   BP: (!) 180/106   BP Location: Left arm   Patient Position: Sitting   Pulse: 90   SpO2: 97%   Weight: 87.1 kg (192 lb)   Height: 154.9 cm (61\")     Objective   Physical Exam   Constitutional: She appears well-developed and well-nourished.   Cardiovascular: Normal rate.    Pulmonary/Chest: Effort normal.   Neurological: She is alert.   Psychiatric: Her speech is normal. She expresses impulsivity. She exhibits abnormal recent memory. She exhibits normal remote memory.   Nursing note and vitals reviewed.      Assessment/Plan   Siobhan was seen today for follow-up, ankle injury, memory loss and anemia.    Diagnoses and all orders for this visit:    Injury of left foot including toes, sequela    Renovascular hypertension    Mood disorder  -     buPROPion XL (WELLBUTRIN XL) 150 MG 24 hr tablet; Take 1 tablet by mouth Every Morning.    Hyponatremia  -     Basic Metabolic Panel; Future    bp is up, may not taken her coreg this am.    There are no Patient Instructions on file for this visit.    There are no discontinued medications.     Return in about " 3 months (around 9/8/2018).    Dr. Marino Higgins  [place], [place]

## 2018-06-13 ENCOUNTER — RESULTS ENCOUNTER (OUTPATIENT)
Dept: FAMILY MEDICINE CLINIC | Facility: CLINIC | Age: 83
End: 2018-06-13

## 2018-06-13 DIAGNOSIS — E87.1 HYPONATREMIA: ICD-10-CM

## 2018-07-02 RX ORDER — CLOPIDOGREL BISULFATE 75 MG/1
TABLET ORAL
Qty: 90 TABLET | Refills: 0 | Status: SHIPPED | OUTPATIENT
Start: 2018-07-02 | End: 2018-10-02 | Stop reason: SDUPTHER

## 2018-07-02 RX ORDER — ALLOPURINOL 100 MG/1
TABLET ORAL
Qty: 90 TABLET | Refills: 0 | Status: SHIPPED | OUTPATIENT
Start: 2018-07-02 | End: 2018-10-02 | Stop reason: SDUPTHER

## 2018-07-13 DIAGNOSIS — R41.3 MEMORY DEFICIT: Primary | ICD-10-CM

## 2018-08-15 ENCOUNTER — LAB (OUTPATIENT)
Dept: OTHER | Facility: HOSPITAL | Age: 83
End: 2018-08-15

## 2018-08-15 ENCOUNTER — HOSPITAL ENCOUNTER (OUTPATIENT)
Dept: GENERAL RADIOLOGY | Facility: HOSPITAL | Age: 83
Discharge: HOME | End: 2018-08-15
Attending: INTERNAL MEDICINE | Admitting: INTERNAL MEDICINE

## 2018-08-15 DIAGNOSIS — E55.9 VITAMIN D DEFICIENCY: ICD-10-CM

## 2018-08-15 DIAGNOSIS — D47.2 MONOCLONAL GAMMOPATHY: ICD-10-CM

## 2018-08-15 LAB
ALBUMIN SERPL-MCNC: 4.2 G/DL (ref 3.5–5.2)
ALBUMIN/GLOB SERPL: 1 G/DL
ALP SERPL-CCNC: 85 U/L (ref 39–117)
ALT SERPL W P-5'-P-CCNC: 6 U/L (ref 1–33)
ANION GAP SERPL CALCULATED.3IONS-SCNC: 13.3 MMOL/L
AST SERPL-CCNC: 14 U/L (ref 1–32)
BASOPHILS # BLD AUTO: 0.01 10*3/MM3 (ref 0–0.2)
BASOPHILS NFR BLD AUTO: 0.2 % (ref 0–1.5)
BILIRUB SERPL-MCNC: 0.3 MG/DL (ref 0.1–1.2)
BUN BLD-MCNC: 21 MG/DL (ref 8–23)
BUN/CREAT SERPL: 18.3 (ref 7–25)
CALCIUM SPEC-SCNC: 9.4 MG/DL (ref 8.6–10.5)
CHLORIDE SERPL-SCNC: 102 MMOL/L (ref 98–107)
CO2 SERPL-SCNC: 25.7 MMOL/L (ref 22–29)
CREAT BLD-MCNC: 1.15 MG/DL (ref 0.57–1)
DEPRECATED RDW RBC AUTO: 45.2 FL (ref 37–54)
EOSINOPHIL # BLD AUTO: 0.08 10*3/MM3 (ref 0–0.7)
EOSINOPHIL NFR BLD AUTO: 1.7 % (ref 0.3–6.2)
ERYTHROCYTE [DISTWIDTH] IN BLOOD BY AUTOMATED COUNT: 12.9 % (ref 11.7–13)
GFR SERPL CREATININE-BSD FRML MDRD: 45 ML/MIN/1.73
GLOBULIN UR ELPH-MCNC: 4.2 GM/DL
GLUCOSE BLD-MCNC: 101 MG/DL (ref 65–99)
HCT VFR BLD AUTO: 40.5 % (ref 35.6–45.5)
HGB BLD-MCNC: 12.4 G/DL (ref 11.9–15.5)
IMM GRANULOCYTES # BLD: 0 10*3/MM3 (ref 0–0.03)
IMM GRANULOCYTES NFR BLD: 0 % (ref 0–0.5)
LYMPHOCYTES # BLD AUTO: 0.86 10*3/MM3 (ref 0.9–4.8)
LYMPHOCYTES NFR BLD AUTO: 18.6 % (ref 19.6–45.3)
MCH RBC QN AUTO: 29.6 PG (ref 26.9–32)
MCHC RBC AUTO-ENTMCNC: 30.6 G/DL (ref 32.4–36.3)
MCV RBC AUTO: 96.7 FL (ref 80.5–98.2)
MONOCYTES # BLD AUTO: 0.37 10*3/MM3 (ref 0.2–1.2)
MONOCYTES NFR BLD AUTO: 8 % (ref 5–12)
NEUTROPHILS # BLD AUTO: 3.3 10*3/MM3 (ref 1.9–8.1)
NEUTROPHILS NFR BLD AUTO: 71.5 % (ref 42.7–76)
PLATELET # BLD AUTO: 165 10*3/MM3 (ref 140–500)
PMV BLD AUTO: 9.2 FL (ref 6–12)
POTASSIUM BLD-SCNC: 4.5 MMOL/L (ref 3.5–5.2)
PROT SERPL-MCNC: 8.4 G/DL (ref 6–8.5)
RBC # BLD AUTO: 4.19 10*6/MM3 (ref 3.9–5.2)
SODIUM BLD-SCNC: 141 MMOL/L (ref 136–145)
WBC NRBC COR # BLD: 4.62 10*3/MM3 (ref 4.5–10.7)

## 2018-08-15 PROCEDURE — 83883 ASSAY NEPHELOMETRY NOT SPEC: CPT | Performed by: INTERNAL MEDICINE

## 2018-08-15 PROCEDURE — 80053 COMPREHEN METABOLIC PANEL: CPT | Performed by: INTERNAL MEDICINE

## 2018-08-15 PROCEDURE — 86334 IMMUNOFIX E-PHORESIS SERUM: CPT | Performed by: INTERNAL MEDICINE

## 2018-08-15 PROCEDURE — 84165 PROTEIN E-PHORESIS SERUM: CPT | Performed by: INTERNAL MEDICINE

## 2018-08-15 PROCEDURE — 36415 COLL VENOUS BLD VENIPUNCTURE: CPT

## 2018-08-15 PROCEDURE — 77075 RADEX OSSEOUS SURVEY COMPL: CPT

## 2018-08-15 PROCEDURE — 82784 ASSAY IGA/IGD/IGG/IGM EACH: CPT | Performed by: INTERNAL MEDICINE

## 2018-08-15 PROCEDURE — 85025 COMPLETE CBC W/AUTO DIFF WBC: CPT | Performed by: INTERNAL MEDICINE

## 2018-08-15 RX ORDER — ERGOCALCIFEROL 1.25 MG/1
50000 CAPSULE ORAL WEEKLY
Qty: 4 CAPSULE | Refills: 11 | Status: SHIPPED | OUTPATIENT
Start: 2018-08-15 | End: 2019-09-03 | Stop reason: SDUPTHER

## 2018-08-19 NOTE — PROGRESS NOTES
Subjective feeling improved, has noticed swelling her right shoulder    REASON FOR CONSULTATION: Postoperative anemia,?  Myeloma  Provide an opinion on any further workup or treatment                             REQUESTING PHYSICIAN: Marino Higgins MD      History of Present Illness         The patient is an 82-year-old female with a history of essential hypertension, chronic systolic heart failure, diabetes, hyperlipidemia and COPD who experienced a try malleolar ankle fracture on the right admitted February 26 through March 02, 2018.  She evidently has sustained a fracture in January of this year.     She has a significant history that her daughter describes as being told of a right frontal bone lesion at [facility].  Review of [facility] care everywhere does include a CT scan of the brain in 2015 significant for lytic right frontal bone lesion that's nonspecific but raised the possibility of myeloma or metastatic disease.  The patient has a significant additional history of renal cell carcinoma with resection many years ago and this led to a whole body bone scan May 29, 2015 with no abnormal uptake in the right frontal bone again leaving the possibility of a myelomatous lesion or benign skull lesion.  Further history includes a review by Dr. Barnard in June 2016 for numerable tiny noncalcified pulmonary nodules that are felt to be sequela level granulomatous lung disease but no significant change seen on several CAT scans.     The patient, thereafter, head evaluations at [facility] for cardiovascular issues including adjustment of her medications, treatment for LV dysfunction admission for weakness associated with hyponatremia in February-medication related.  This in part evidently lead to a right trimalleolar ankle fracture that required repair on February 26, later development of gabapentin and treatment-induced encephalopathy which improving medication was discontinued.  Later unexpectedly in mid  May she had further pain down her left foot revealing a corner avulsion fracture at the base the middle phalanx second digit-?  Etiology and further as result of chronic back pain thoracic films revealing no acute osseous abnormality, multilevel degenerative disc disease.  There is no mention of potential myelomatous lesions.  As she is seen with her daughter May 21, 2018 they both indicate that she's been reviewed by neurology memory dysfunction  and now also struggles with a progressive dementia.     She was seen by her primary physician May 8 again for follow-up of her fracture as well as CKD stage I thank you blood loss is causing postoperative anemia.  Her studies noted March 18 included H&H of 9.5 and 30.9 with a white count of 5960, MCV of 92.0, MCH 28.3, MCHC of 30.7, platelet count of 185,000 and normal automated differential, creatinine of 0.94 with BUN of 12 and EGFR of 57.  Her follow-up H&H May 8 were 11.9 and 37.1 white count of 4810,, platelet count of 213,000 with MCV of 90.9, MCH of 29.2 and MCHC 32.1.  Additional exams revealed an albumin of 3.3, gammaglobulin of 1.9, M spike of 1.7, globulin of 4.1.  We are asked to see her May 21 for her slow recovery from postoperative anemia but recognizes her history of hyponatremia, fluid restriction and gradual recovery to her current hemoglobin of above 12 g percent suggestingcomponent hemodilution ongoing previously.  There is however her monoclonal gammopathy and a history has once again reviewed concerning this MGUS.     The patient underwent a series of studies including BUN and creatinine of 22 and 1.17, total protein of 8.9, sodium of 139, ferritin of 45.7, iron is 79, TIBC of 367, 22% saturation, B12 greater than 2000, MMA of 152 beta-2 microglobulin 3.1 and paraprotein studies-IgG of 2069, IgA of 88, IgM 41, M spike of 1.8 and light chains with free light kappa 24.1, free lambda light chain 14.9 with a ratio of 1.62.  These studies would be  consistent with a monoclonal gammopathy of unknown significance.  We discussed these findings in great detail and the patient is seen June 01, 2018.  She and her daughter are quite concerned about her cognitive decline and discusses with supportive oncology- Rena Rebolledo-with plans for the patient to be seen at the [facility] in the next 4 weeks.    The patient is follow-up August 20, 2018.  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  Additional exams included her proteins with an IgG of 2122, IgA 98, IgM of 53, M spike of 2.3 and CBC with H&H of 12.4 and 40.5 white count of 4620 and platelet count of 165,000.    The patient is next seen August 20, 2018.  Her exams do not suggest progression of myeloma though she has developed a swelling in her right shoulder fairly acutely that's limiting motion.  She was not able to be seen by geriatric physician and unfortunately is going to be seen by neurologist at [facility] for assessment of dementia.    Past Medical History:   Diagnosis Date   • Acute kidney failure (CMS/HCC)    • Anemia    • Anxiety    • Arthritis    • Asthma    • Cancer (CMS/HCC) 2004    Left kidney tumor   • Cataract    • CHF (congestive heart failure) (CMS/HCC)    • Chronic kidney disease    • COPD (chronic obstructive pulmonary disease) (CMS/HCC)    • Diabetes mellitus (CMS/HCC)     Type 2   • GERD (gastroesophageal reflux disease)    • H/O Chronic systolic heart failure 02/2018   • H/O Gout    • H/O LV dysfunction    • H/O NICM (nonischemic cardiomyopathy) 2017   • H/O PONV (postoperative nausea and vomiting)    • H/O Syncope and collapse 2015   • H/O Valvular disease 2015    Mitral/Tricuspid   • Hyperlipidemia    • Hypertension    • Hyponatremia 02/2018   • Infectious viral hepatitis    • LBBB (left bundle branch block)    • Liver disease    • Myeloma (CMS/HCC)     • Osteoporosis    • Pacemaker    • Shortness of breath    • Stroke (CMS/HCC)     Memory loss   • TIA (transient ischemic attack)         Past Surgical History:   Procedure Laterality Date   • CARDIAC CATHETERIZATION      mild dz, no stents   • CATARACT EXTRACTION     • CHOLECYSTECTOMY     • COLONOSCOPY  12/2005    Polyps x4   • EYE SURGERY      Implants   • HEMORRHOIDECTOMY     • JOINT REPLACEMENT Right 2011   • KIDNEY SURGERY     • NEPHRECTOMY Left 11/2004   • ORIF ANKLE FRACTURE Right 02/2018    Trimalleolar; delayed healing   • PACEMAKER IMPLANTATION  05/29/2015    [place] [employer] dual chamber/Dr. Weber        Current Outpatient Prescriptions on File Prior to Visit   Medication Sig Dispense Refill   • Acetaminophen (TYLENOL 8 HOUR PO) Take 650 mg by mouth 2 (Two) Times a Day. 1/2 TAB    BID     • albuterol (PROVENTIL HFA;VENTOLIN HFA) 108 (90 Base) MCG/ACT inhaler Inhale 2 puffs.     • allopurinol (ZYLOPRIM) 100 MG tablet TAKE ONE TABLET BY MOUTH DAILY 90 tablet 0   • buPROPion XL (WELLBUTRIN XL) 150 MG 24 hr tablet Take 1 tablet by mouth Every Morning. 30 tablet 5   • carvedilol (COREG) 25 MG tablet Take 25 mg by mouth 2 (Two) Times a Day With Meals.     • clopidogrel (PLAVIX) 75 MG tablet TAKE TAKE ONE TABLET BY MOUTH DAILY 90 tablet 0   • famotidine (PEPCID) 20 MG tablet Take 20 mg by mouth Daily.     • hydrALAZINE (APRESOLINE) 25 MG tablet Take 25 mg by mouth 3 (Three) Times a Day.     • Multiple Vitamins-Minerals (CENTRUM PO) Take  by mouth Daily.     • vitamin D (ERGOCALCIFEROL) 27728 units capsule capsule Take 1 capsule by mouth 1 (One) Time Per Week. 4 capsule 11   • Cobalamine Combinations (B-12) 100-5000 MCG sublingual tablet Place 3,000 mcg under the tongue.     • ferrous sulfate 325 (65 FE) MG tablet Take 325 mg by mouth Daily.     • SODIUM CHLORIDE PO Take 1 g by mouth 3 (Three) Times a Day.     • vitamin C (ASCORBIC ACID) 500 MG tablet Take 500 mg by mouth 2 (Two) Times a Day.       No current  "facility-administered medications on file prior to visit.         ALLERGIES:    Allergies   Allergen Reactions   • Hydrocodone Nausea And Vomiting and Swelling     Mouth swelling   • Hydromorphone Swelling   • Morphine And Related Nausea And Vomiting   • Oxycodone Swelling   • Tramadol Nausea And Vomiting   • Atenolol    • Codeine    • Diovan [Valsartan] Dizziness and Cough     Other reaction(s): Cough, Dizziness   • Gabapentin Confusion        Social History     Social History   • Marital status:    • Number of children: 2   • Years of education: High School     Occupational History   •  Retired     Social History Main Topics   • Smoking status: Never Smoker   • Smokeless tobacco: Never Used   • Alcohol use No   • Drug use: No   • Sexual activity: Not Currently     Partners: Male     Other Topics Concern   • Not on file        Family History   Problem Relation Age of Onset   • Heart disease Mother    • Hypertension Mother    • Heart disease Father    • Hypertension Father    • Ovarian cancer Sister 50   • Dementia Sister    • Hypertension Sister    • Heart disease Sister    • Liver disease Sister    • Drug abuse Sister    • Stroke Sister    • Early death Sister    • Heart disease Brother    • Hypertension Brother    • Diabetes Other    • Anemia Other         Review of Systems   Gen.: Variable weight loss, chills, night sweats, fatigue, cold sensitivity  ENT: Progressive deafness bilaterally  Respiratory: Chronic cough and shortness of breath  Gastrointestinal: Poor appetite, chronic indigestion, periodic diarrhea  Genitourinary: Increasing nocturia  Skeletal: Chronic back pain, inflamed joints for many years  Neurologic: Headaches, dizziness, difficulty in ambulation    Objective     Vitals:    08/20/18 1338   BP: 180/90   Pulse: 71   Resp: 16   Temp: 98.2 °F (36.8 °C)   SpO2: 97%   Weight: 85.7 kg (189 lb)   Height: 154.9 cm (60.98\")   PainSc:   7   PainLoc: Back  Comment: back and shoulder pain "     Current Status 8/20/2018   ECOG score 0       Physical Exam  repeat physical exam June 01, 2018  Constitutional: She appears well-developed and well-nourished.   Cardiovascular: Normal rate.    Pulmonary/Chest: Effort normal.  Clear bilaterally to auscultation and percussion   Cardiovascular: Normal rate regular rhythm without murmur or gallop  Abdomen: Soft, nontender, moderately obese without hepatosplenomegaly or ascites   Musculoskeletal: Right ankle: She exhibits decreased range of motion and swelling. Tenderness. Thoracic back: She exhibits bony tenderness.  Left foot: There is decreased range of motion, tenderness and bony tenderness.    Further examination of right shoulder reveals a bulbous protuberance measuring approximately 5 cm across nonerythematous, nontender, movable with a rubbery consistency- ?  Lipoma    RECENT LABS:  Hematology WBC   Date Value Ref Range Status   08/15/2018 4.62 4.50 - 10.70 10*3/mm3 Final     RBC   Date Value Ref Range Status   08/15/2018 4.19 3.90 - 5.20 10*6/mm3 Final   05/08/2018 4.08 (L) 4.20 - 5.40 10*6/mm3 Final     Hemoglobin   Date Value Ref Range Status   08/15/2018 12.4 11.9 - 15.5 g/dL Final     Hematocrit   Date Value Ref Range Status   08/15/2018 40.5 35.6 - 45.5 % Final     Platelets   Date Value Ref Range Status   08/15/2018 165 140 - 500 10*3/mm3 Final          Assessment/Plan    .  The patient is an 82-year-old female with a somewhat complex history including hypertension, systolic heart failure, diabetes, hyperlipidemia, COPD, slowly progressive memory dysfunction and recent development of further complications including trimalleolar fracture on the right with admission later in February to March for repair at which time she was experiencing hyponatremia from Celexa use.  After surgery she was transferred to a nursing facility where this medication, unfortunately, continued as did her hyponatremia and there is been some time spent trying to improve this  issue for her.  Following this without, she injured her left foot and had sustained an avulsion fracture at the base of the middle phalanx.  This too has been addressed orthopedically and she is slowly recovering.  All of this is somewhat complicated by her neurologic decline with memory dysfunction and apparent dementia.    As she was slow in recovery for her anemia status after her most recent surgery we are asked to see her in consultation finding that she has improved to nearly normal hemoglobin and hematocrit levels but does have a monoclonal gammopathy with a history, as described, of a potential skull lesion in 2015.  Her subsequent plain films do not demonstrate other lytic lesions including recently performed thoracic spine films.  We've discussed this may have occurred on the basis of progressive osteoporosis rather than myeloma and that the patient's anemia may well be improved as result of previous hemodilution-secondary to medication induced SIADH.  After considerable discussion with the patient and her daughter we went on to assess as described above revealing evidence of osteopenia and MGUS.  As the patient is seen June 1 she is stable hematologically without progression of anemia further.  After additional 30 minutes discussion we obtain follow-up laboratory exams and bone survey that failed to show any significant progression of disease.  The patient's back pain appears to have stabilized though she is having right shoulder abnormalities that, while consistent with lipoma, should be assessed under the circumstances and we plan an MRI of the right shoulder.plan:  *MRI of right shoulder 7-10 days  *Follow-up one week later , MD   *Assessment by neurology-Sudeep's -within the next month.

## 2018-08-20 ENCOUNTER — OFFICE VISIT (OUTPATIENT)
Dept: ONCOLOGY | Facility: CLINIC | Age: 83
End: 2018-08-20

## 2018-08-20 ENCOUNTER — APPOINTMENT (OUTPATIENT)
Dept: LAB | Facility: HOSPITAL | Age: 83
End: 2018-08-20

## 2018-08-20 VITALS
HEART RATE: 71 BPM | TEMPERATURE: 98.2 F | DIASTOLIC BLOOD PRESSURE: 90 MMHG | OXYGEN SATURATION: 97 % | RESPIRATION RATE: 16 BRPM | BODY MASS INDEX: 35.68 KG/M2 | HEIGHT: 61 IN | WEIGHT: 189 LBS | SYSTOLIC BLOOD PRESSURE: 180 MMHG

## 2018-08-20 DIAGNOSIS — D47.2 MONOCLONAL GAMMOPATHY: Primary | ICD-10-CM

## 2018-08-20 LAB
ALBUMIN SERPL-MCNC: 3.4 G/DL (ref 2.9–4.4)
ALBUMIN/GLOB SERPL: 0.8 {RATIO} (ref 0.7–1.7)
ALPHA1 GLOB FLD ELPH-MCNC: 0.2 G/DL (ref 0–0.4)
ALPHA2 GLOB SERPL ELPH-MCNC: 0.9 G/DL (ref 0.4–1)
B-GLOBULIN SERPL ELPH-MCNC: 1 G/DL (ref 0.7–1.3)
GAMMA GLOB SERPL ELPH-MCNC: 2.4 G/DL (ref 0.4–1.8)
GLOBULIN SER CALC-MCNC: 4.5 G/DL (ref 2.2–3.9)
IGA SERPL-MCNC: 98 MG/DL (ref 64–422)
IGG SERPL-MCNC: 2122 MG/DL (ref 700–1600)
IGM SERPL-MCNC: 53 MG/DL (ref 26–217)
INTERPRETATION SERPL IEP-IMP: ABNORMAL
KAPPA LC SERPL-MCNC: 23 MG/L (ref 3.3–19.4)
KAPPA LC/LAMBDA SER: 1.54 {RATIO} (ref 0.26–1.65)
LAMBDA LC FREE SERPL-MCNC: 14.9 MG/L (ref 5.7–26.3)
Lab: ABNORMAL
M-SPIKE: 2.3 G/DL
PROT SERPL-MCNC: 7.9 G/DL (ref 6–8.5)

## 2018-08-20 PROCEDURE — 99214 OFFICE O/P EST MOD 30 MIN: CPT | Performed by: INTERNAL MEDICINE

## 2018-08-20 PROCEDURE — G0463 HOSPITAL OUTPT CLINIC VISIT: HCPCS | Performed by: INTERNAL MEDICINE

## 2018-08-22 ENCOUNTER — APPOINTMENT (OUTPATIENT)
Dept: OTHER | Facility: HOSPITAL | Age: 83
End: 2018-08-22

## 2018-08-22 ENCOUNTER — APPOINTMENT (OUTPATIENT)
Dept: ONCOLOGY | Facility: CLINIC | Age: 83
End: 2018-08-22

## 2018-08-24 ENCOUNTER — TRANSCRIBE ORDERS (OUTPATIENT)
Dept: ADMINISTRATIVE | Facility: HOSPITAL | Age: 83
End: 2018-08-24

## 2018-08-24 DIAGNOSIS — F03.90 SENILE DEMENTIA, UNCOMPLICATED (HCC): Primary | ICD-10-CM

## 2018-08-30 ENCOUNTER — APPOINTMENT (OUTPATIENT)
Dept: MRI IMAGING | Facility: HOSPITAL | Age: 83
End: 2018-08-30
Attending: INTERNAL MEDICINE

## 2018-08-31 ENCOUNTER — HOSPITAL ENCOUNTER (OUTPATIENT)
Dept: MRI IMAGING | Facility: HOSPITAL | Age: 83
Discharge: HOME | End: 2018-08-31
Attending: INTERNAL MEDICINE | Admitting: INTERNAL MEDICINE

## 2018-08-31 ENCOUNTER — HOSPITAL ENCOUNTER (OUTPATIENT)
Dept: MRI IMAGING | Facility: HOSPITAL | Age: 83
Discharge: HOME | End: 2018-08-31

## 2018-08-31 VITALS — DIASTOLIC BLOOD PRESSURE: 71 MMHG | OXYGEN SATURATION: 95 % | HEART RATE: 69 BPM | SYSTOLIC BLOOD PRESSURE: 159 MMHG

## 2018-08-31 VITALS
SYSTOLIC BLOOD PRESSURE: 196 MMHG | DIASTOLIC BLOOD PRESSURE: 98 MMHG | OXYGEN SATURATION: 95 % | RESPIRATION RATE: 18 BRPM | HEART RATE: 76 BPM

## 2018-08-31 DIAGNOSIS — D47.2 MONOCLONAL GAMMOPATHY: ICD-10-CM

## 2018-08-31 DIAGNOSIS — F03.90 SENILE DEMENTIA, UNCOMPLICATED (HCC): ICD-10-CM

## 2018-08-31 LAB — CREAT BLDA-MCNC: 1.2 MG/DL (ref 0.6–1.3)

## 2018-08-31 PROCEDURE — 0 GADOBENATE DIMEGLUMINE 529 MG/ML SOLUTION: Performed by: NURSE PRACTITIONER

## 2018-08-31 PROCEDURE — A9577 INJ MULTIHANCE: HCPCS | Performed by: NURSE PRACTITIONER

## 2018-08-31 PROCEDURE — 82565 ASSAY OF CREATININE: CPT

## 2018-08-31 PROCEDURE — 70553 MRI BRAIN STEM W/O & W/DYE: CPT

## 2018-08-31 PROCEDURE — 73223 MRI JOINT UPR EXTR W/O&W/DYE: CPT

## 2018-08-31 RX ADMIN — GADOBENATE DIMEGLUMINE 17 ML: 529 INJECTION, SOLUTION INTRAVENOUS at 15:10

## 2018-09-05 ENCOUNTER — TELEPHONE (OUTPATIENT)
Dept: ORTHOPEDIC SURGERY | Facility: CLINIC | Age: 83
End: 2018-09-05

## 2018-09-05 ENCOUNTER — OFFICE VISIT (OUTPATIENT)
Dept: ONCOLOGY | Facility: CLINIC | Age: 83
End: 2018-09-05

## 2018-09-05 ENCOUNTER — APPOINTMENT (OUTPATIENT)
Dept: OTHER | Facility: HOSPITAL | Age: 83
End: 2018-09-05

## 2018-09-05 VITALS
WEIGHT: 188 LBS | HEART RATE: 70 BPM | BODY MASS INDEX: 35.5 KG/M2 | SYSTOLIC BLOOD PRESSURE: 156 MMHG | HEIGHT: 61 IN | DIASTOLIC BLOOD PRESSURE: 82 MMHG | RESPIRATION RATE: 18 BRPM | TEMPERATURE: 98.2 F | OXYGEN SATURATION: 97 %

## 2018-09-05 DIAGNOSIS — M15.9 PRIMARY OSTEOARTHRITIS INVOLVING MULTIPLE JOINTS: ICD-10-CM

## 2018-09-05 DIAGNOSIS — S46.011S SUPRASPINATUS TENDON RUPTURE, RIGHT, SEQUELA: Primary | ICD-10-CM

## 2018-09-05 DIAGNOSIS — C64.9 RENAL CELL CARCINOMA, UNSPECIFIED LATERALITY (HCC): Primary | ICD-10-CM

## 2018-09-05 DIAGNOSIS — D47.2 MONOCLONAL GAMMOPATHY: ICD-10-CM

## 2018-09-05 LAB
BASOPHILS # BLD AUTO: 0.01 10*3/MM3 (ref 0–0.2)
BASOPHILS NFR BLD AUTO: 0.2 % (ref 0–1.5)
DEPRECATED RDW RBC AUTO: 42.4 FL (ref 37–54)
EOSINOPHIL # BLD AUTO: 0.07 10*3/MM3 (ref 0–0.7)
EOSINOPHIL NFR BLD AUTO: 1.6 % (ref 0.3–6.2)
ERYTHROCYTE [DISTWIDTH] IN BLOOD BY AUTOMATED COUNT: 12.6 % (ref 11.7–13)
HCT VFR BLD AUTO: 37 % (ref 35.6–45.5)
HGB BLD-MCNC: 12.4 G/DL (ref 11.9–15.5)
IMM GRANULOCYTES # BLD: 0.02 10*3/MM3 (ref 0–0.03)
IMM GRANULOCYTES NFR BLD: 0.5 % (ref 0–0.5)
LYMPHOCYTES # BLD AUTO: 1.04 10*3/MM3 (ref 0.9–4.8)
LYMPHOCYTES NFR BLD AUTO: 23.5 % (ref 19.6–45.3)
MCH RBC QN AUTO: 30.7 PG (ref 26.9–32)
MCHC RBC AUTO-ENTMCNC: 33.5 G/DL (ref 32.4–36.3)
MCV RBC AUTO: 91.6 FL (ref 80.5–98.2)
MONOCYTES # BLD AUTO: 0.52 10*3/MM3 (ref 0.2–1.2)
MONOCYTES NFR BLD AUTO: 11.8 % (ref 5–12)
NEUTROPHILS # BLD AUTO: 2.76 10*3/MM3 (ref 1.9–8.1)
NEUTROPHILS NFR BLD AUTO: 62.4 % (ref 42.7–76)
NRBC BLD MANUAL-RTO: 0 /100 WBC (ref 0–0)
PLATELET # BLD AUTO: 194 10*3/MM3 (ref 140–500)
PMV BLD AUTO: 8.5 FL (ref 6–12)
RBC # BLD AUTO: 4.04 10*6/MM3 (ref 3.9–5.2)
WBC NRBC COR # BLD: 4.42 10*3/MM3 (ref 4.5–10.7)

## 2018-09-05 PROCEDURE — 99214 OFFICE O/P EST MOD 30 MIN: CPT | Performed by: INTERNAL MEDICINE

## 2018-09-05 PROCEDURE — 85025 COMPLETE CBC W/AUTO DIFF WBC: CPT | Performed by: INTERNAL MEDICINE

## 2018-09-05 PROCEDURE — 36415 COLL VENOUS BLD VENIPUNCTURE: CPT

## 2018-09-05 RX ORDER — AMLODIPINE BESYLATE 5 MG/1
5 TABLET ORAL
COMMUNITY
Start: 2018-08-29 | End: 2019-01-09

## 2018-09-05 RX ORDER — HYDRALAZINE HYDROCHLORIDE 50 MG/1
50 TABLET, FILM COATED ORAL 3 TIMES DAILY
COMMUNITY
Start: 2018-09-04 | End: 2019-01-01 | Stop reason: HOSPADM

## 2018-09-05 RX ORDER — AMLODIPINE BESYLATE 5 MG/1
5 TABLET ORAL EVERY MORNING
COMMUNITY
End: 2019-07-10 | Stop reason: SDUPTHER

## 2018-09-05 NOTE — PROGRESS NOTES
Subjective feeling improved, still has pain per right shoulder    REASON FOR CONSULTATION: Postoperative anemia,?  Myeloma  Provide an opinion on any further workup or treatment                             REQUESTING PHYSICIAN: Marino Higgins MD      History of Present Illness         The patient is an 82-year-old female with a history of essential hypertension, chronic systolic heart failure, diabetes, hyperlipidemia and COPD who experienced a try malleolar ankle fracture on the right admitted February 26 through March 02, 2018.  She evidently has sustained a fracture in January of this year.     She has a significant history that her daughter describes as being told of a right frontal bone lesion at [facility].  Review of [facility] care everywhere does include a CT scan of the brain in 2015 significant for lytic right frontal bone lesion that's nonspecific but raised the possibility of myeloma or metastatic disease.  The patient has a significant additional history of renal cell carcinoma with resection many years ago and this led to a whole body bone scan May 29, 2015 with no abnormal uptake in the right frontal bone again leaving the possibility of a myelomatous lesion or benign skull lesion.  Further history includes a review by Dr. Barnard in June 2016 for numerable tiny noncalcified pulmonary nodules that are felt to be sequela level granulomatous lung disease but no significant change seen on several CAT scans.     The patient, thereafter, head evaluations at [facility] for cardiovascular issues including adjustment of her medications, treatment for LV dysfunction admission for weakness associated with hyponatremia in February-medication related.  This in part evidently lead to a right trimalleolar ankle fracture that required repair on February 26, later development of gabapentin and treatment-induced encephalopathy which improving medication was discontinued.  Later unexpectedly in mid May she  had further pain down her left foot revealing a corner avulsion fracture at the base the middle phalanx second digit-?  Etiology and further as result of chronic back pain thoracic films revealing no acute osseous abnormality, multilevel degenerative disc disease.  There is no mention of potential myelomatous lesions.  As she is seen with her daughter May 21, 2018 they both indicate that she's been reviewed by neurology memory dysfunction  and now also struggles with a progressive dementia.     She was seen by her primary physician May 8 again for follow-up of her fracture as well as CKD stage I thank you blood loss is causing postoperative anemia.  Her studies noted March 18 included H&H of 9.5 and 30.9 with a white count of 5960, MCV of 92.0, MCH 28.3, MCHC of 30.7, platelet count of 185,000 and normal automated differential, creatinine of 0.94 with BUN of 12 and EGFR of 57.  Her follow-up H&H May 8 were 11.9 and 37.1 white count of 4810,, platelet count of 213,000 with MCV of 90.9, MCH of 29.2 and MCHC 32.1.  Additional exams revealed an albumin of 3.3, gammaglobulin of 1.9, M spike of 1.7, globulin of 4.1.  We are asked to see her May 21 for her slow recovery from postoperative anemia but recognizes her history of hyponatremia, fluid restriction and gradual recovery to her current hemoglobin of above 12 g percent suggestingcomponent hemodilution ongoing previously.  There is however her monoclonal gammopathy and a history has once again reviewed concerning this MGUS.     The patient underwent a series of studies including BUN and creatinine of 22 and 1.17, total protein of 8.9, sodium of 139, ferritin of 45.7, iron is 79, TIBC of 367, 22% saturation, B12 greater than 2000, MMA of 152 beta-2 microglobulin 3.1 and paraprotein studies-IgG of 2069, IgA of 88, IgM 41, M spike of 1.8 and light chains with free light kappa 24.1, free lambda light chain 14.9 with a ratio of 1.62.  These studies would be consistent with  a monoclonal gammopathy of unknown significance.  We discussed these findings in great detail and the patient is seen June 01, 2018.  She and her daughter are quite concerned about her cognitive decline and discusses with supportive oncology- Rena Rebolledo-with plans for the patient to be seen at the [facility] in the next 4 weeks.    The patient is follow-up August 20, 2018.  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  Additional exams included her proteins with an IgG of 2122, IgA 98, IgM of 53, M spike of 2.3 and CBC with H&H of 12.4 and 40.5 white count of 4620 and platelet count of 165,000.    The patient is next seen August 20, 2018.  Her exams do not suggest progression of myeloma though she has developed a swelling in her right shoulder fairly acutely that's limiting motion.  She was not able to be seen by geriatric physician and unfortunately is going to be seen by neurologist at [facility] for assessment of dementia.    The patient went on to have an assessment with MRI of the right shoulder showing a supraspinatus tear and enlargement of the bursa.  This is becoming quite painful for her and we discussed orthopedic assessment.  There is, fortunately, no evidence of malignancy involved here.     Past Medical History:   Diagnosis Date   • Acute kidney failure (CMS/HCC)    • Anemia    • Anxiety    • Arthritis    • Asthma    • Cancer (CMS/HCC) 2004    Left kidney tumor   • Cataract    • CHF (congestive heart failure) (CMS/[facility]ly [facility])    • Chronic kidney disease    • COPD (chronic obstructive pulmonary disease) (CMS/[facility])    • Diabetes mellitus (CMS/[facility])     Type 2   • GERD (gastroesophageal reflux disease)    • H/O Chronic systolic heart failure 02/2018   • H/O Gout    • H/O LV dysfunction    • H/O NICM (nonischemic cardiomyopathy) 2017   • H/O PONV (postoperative nausea and  vomiting)    • H/O Syncope and collapse 2015   • H/O Valvular disease 2015    Mitral/Tricuspid   • Hyperlipidemia    • Hypertension    • Hyponatremia 02/2018   • Infectious viral hepatitis    • LBBB (left bundle branch block)    • Liver disease    • Myeloma (CMS/HCC)    • Osteoporosis    • Pacemaker    • Shortness of breath    • Stroke (CMS/HCC)     Memory loss   • TIA (transient ischemic attack)         Past Surgical History:   Procedure Laterality Date   • CARDIAC CATHETERIZATION      mild dz, no stents   • CATARACT EXTRACTION     • CHOLECYSTECTOMY     • COLONOSCOPY  12/2005    Polyps x4   • EYE SURGERY      Implants   • HEMORRHOIDECTOMY     • JOINT REPLACEMENT Right 2011   • KIDNEY SURGERY     • NEPHRECTOMY Left 11/2004   • ORIF ANKLE FRACTURE Right 02/2018    Trimalleolar; delayed healing   • PACEMAKER IMPLANTATION  05/29/2015    Revionics dual chamber/Dr. Weber        Current Outpatient Prescriptions on File Prior to Visit   Medication Sig Dispense Refill   • Acetaminophen (TYLENOL 8 HOUR PO) Take 650 mg by mouth Take As Directed. 5x per day   2 in am, 1 at lunch, 2 pm     • albuterol (PROVENTIL HFA;VENTOLIN HFA) 108 (90 Base) MCG/ACT inhaler Inhale 2 puffs.     • allopurinol (ZYLOPRIM) 100 MG tablet TAKE ONE TABLET BY MOUTH DAILY 90 tablet 0   • buPROPion XL (WELLBUTRIN XL) 150 MG 24 hr tablet Take 1 tablet by mouth Every Morning. 30 tablet 5   • carvedilol (COREG) 25 MG tablet Take 25 mg by mouth 2 (Two) Times a Day With Meals.     • clopidogrel (PLAVIX) 75 MG tablet TAKE TAKE ONE TABLET BY MOUTH DAILY 90 tablet 0   • Cobalamine Combinations (B-12) 100-5000 MCG sublingual tablet Place 3,000 mcg under the tongue.     • famotidine (PEPCID) 20 MG tablet Take 20 mg by mouth Daily.     • Multiple Vitamins-Minerals (CENTRUM PO) Take  by mouth Daily.     • vitamin C (ASCORBIC ACID) 500 MG tablet Take 500 mg by mouth 2 (Two) Times a Day.     • vitamin D (ERGOCALCIFEROL) 11098 units capsule capsule Take 1 capsule by  mouth 1 (One) Time Per Week. 4 capsule 11   • [DISCONTINUED] hydrALAZINE (APRESOLINE) 25 MG tablet Take 50 mg by mouth 3 (Three) Times a Day. August 24    Changed to 50mg, three times per day.       No current facility-administered medications on file prior to visit.         ALLERGIES:    Allergies   Allergen Reactions   • Hydrocodone Nausea And Vomiting and Swelling     Mouth swelling   • Hydromorphone Swelling   • Morphine And Related Nausea And Vomiting   • Oxycodone Swelling   • Tramadol Nausea And Vomiting   • Atenolol    • Codeine    • Diovan [Valsartan] Dizziness and Cough     Other reaction(s): Cough, Dizziness   • Gabapentin Confusion and Other (See Comments)        Social History     Social History   • Marital status:    • Number of children: 2   • Years of education: High School     Occupational History   •  Retired     Social History Main Topics   • Smoking status: Never Smoker   • Smokeless tobacco: Never Used   • Alcohol use No   • Drug use: No   • Sexual activity: Not Currently     Partners: Male     Other Topics Concern   • Not on file        Family History   Problem Relation Age of Onset   • Heart disease Mother    • Hypertension Mother    • Heart disease Father    • Hypertension Father    • Ovarian cancer Sister 50   • Dementia Sister    • Hypertension Sister    • Heart disease Sister    • Liver disease Sister    • Drug abuse Sister    • Stroke Sister    • Early death Sister    • Heart disease Brother    • Hypertension Brother    • Diabetes Other    • Anemia Other         Review of Systems   Gen.: Variable weight loss, chills, night sweats, fatigue, cold sensitivity  ENT: Progressive deafness bilaterally  Respiratory: Chronic cough and shortness of breath  Gastrointestinal: Poor appetite, chronic indigestion, periodic diarrhea  Genitourinary: Increasing nocturia  Skeletal: Chronic back pain, inflamed joints for many years  Neurologic: Headaches, dizziness, difficulty in  "ambulation    Objective     Vitals:    09/05/18 1411   BP: 156/82   Pulse: 70   Resp: 18   Temp: 98.2 °F (36.8 °C)   TempSrc: Oral   SpO2: 97%   Weight: 85.3 kg (188 lb)   Height: 154.9 cm (60.98\")   PainSc: 10-Worst pain ever   PainLoc: Generalized  Comment: R SHOULDER, BACK     Current Status 9/5/2018   ECOG score 0       Physical Exam  repeat physical exam June 01, 2018  Constitutional: She appears well-developed and well-nourished.   Cardiovascular: Normal rate.    Pulmonary/Chest: Effort normal.  Clear bilaterally to auscultation and percussion   Cardiovascular: Normal rate regular rhythm without murmur or gallop  Abdomen: Soft, nontender, moderately obese without hepatosplenomegaly or ascites   Musculoskeletal: Right ankle: She exhibits decreased range of motion and swelling. Tenderness. Thoracic back: She exhibits bony tenderness.  Left foot: There is decreased range of motion, tenderness and bony tenderness.    Further examination of right shoulder reveals a bulbous protuberance measuring approximately 5 cm across nonerythematous, nontender, movable with a rubbery consistency is unchanged from previous    RECENT LABS:  Hematology WBC   Date Value Ref Range Status   09/05/2018 4.42 (L) 4.50 - 10.70 10*3/mm3 Final     RBC   Date Value Ref Range Status   09/05/2018 4.04 3.90 - 5.20 10*6/mm3 Final   05/08/2018 4.08 (L) 4.20 - 5.40 10*6/mm3 Final     Hemoglobin   Date Value Ref Range Status   09/05/2018 12.4 11.9 - 15.5 g/dL Final     Hematocrit   Date Value Ref Range Status   09/05/2018 37.0 35.6 - 45.5 % Final     Platelets   Date Value Ref Range Status   09/05/2018 194 140 - 500 10*3/mm3 Final      MRI OF THE BRAIN WITH AND WITHOUT CONTRAST - 08/31/2018  IMPRESSION:  1. Since prior MRI of the brain at [facility] on  05/23/2015, there has been slight progression in the mild-to-moderate  small vessel disease in the cerebral and central pontine white matter;  otherwise there is no change, there " remains chronic partial  opacification of the left mastoid air cells with fluid  2. The remainder of MRI of the head is within normal limits with no  evidence of metastatic disease of the head.      RIGHT SHOULDER MRI WITH AND WITHOUT CONTRAST August 31, 2018  IMPRESSION:  Advanced osteoarthritic change at the right glenohumeral  joint with a small (4 mm wide), full-thickness, posterior supraspinatus  tendon tear. An effusion in the glenohumeral joint and distending the  subacromial subdeltoid bursa is associated with some synovial thickening  and enhancement as would be expected given the advanced arthritic  change. The distended bursa may be palpable as an area of swelling over  the anterolateral aspect of the shoulder. There is no evidence of tumor  around the shoulder.        Assessment/Plan    .  The patient is an 82-year-old female with a somewhat complex history including hypertension, systolic heart failure, diabetes, hyperlipidemia, COPD, slowly progressive memory dysfunction and recent development of further complications including trimalleolar fracture on the right with admission later in February to March for repair at which time she was experiencing hyponatremia from Celexa use.  After surgery she was transferred to a nursing facility where this medication, unfortunately, continued as did her hyponatremia and there is been some time spent trying to improve this issue for her.  Following this without, she injured her left foot and had sustained an avulsion fracture at the base of the middle phalanx.  This too has been addressed orthopedically and she is slowly recovering.  All of this is somewhat complicated by her neurologic decline with memory dysfunction and apparent dementia.    As she was slow in recovery for her anemia status after her most recent surgery we are asked to see her in consultation finding that she has improved to nearly normal hemoglobin and hematocrit levels but does have a  monoclonal gammopathy with a history, as described, of a potential skull lesion in 2015.  Her subsequent plain films do not demonstrate other lytic lesions including recently performed thoracic spine films.  We've discussed this may have occurred on the basis of progressive osteoporosis rather than myeloma and that the patient's anemia may well be improved as result of previous hemodilution-secondary to medication induced SIADH.  After considerable discussion with the patient and her daughter we went on to assess as described above revealing evidence of osteopenia and MGUS.  As the patient is seen June 1 she is stable hematologically without progression of anemia further.  After additional 30 minutes discussion we obtain follow-up laboratory exams and bone survey that failed to show any significant progression of disease.  The patient's back pain appears to have stabilized though she is having right shoulder abnormalities that, while consistent with lipoma, should be assessed under the circumstances and we plan an MRI of the right shoulder.plan.we proceeded with MRI of the right shoulder showed evidence of a right supraspinatus tear and bursa enlargement.  She will need an orthopedic assessment concerning this and will help schedule that for her.  Plan:  *Orthopedic assessment  *11 weeks reassessment per CBC, paraprotein and CMP  *12 week follow-up  *Neurocognitive assessment already scheduled

## 2018-09-10 ENCOUNTER — OFFICE VISIT (OUTPATIENT)
Dept: ORTHOPEDIC SURGERY | Facility: CLINIC | Age: 83
End: 2018-09-10

## 2018-09-10 VITALS — BODY MASS INDEX: 31.92 KG/M2 | HEIGHT: 64 IN | WEIGHT: 187 LBS | TEMPERATURE: 97.3 F

## 2018-09-10 DIAGNOSIS — M25.511 CHRONIC RIGHT SHOULDER PAIN: Primary | ICD-10-CM

## 2018-09-10 DIAGNOSIS — G89.29 CHRONIC RIGHT SHOULDER PAIN: Primary | ICD-10-CM

## 2018-09-10 PROCEDURE — 20610 DRAIN/INJ JOINT/BURSA W/O US: CPT | Performed by: ORTHOPAEDIC SURGERY

## 2018-09-10 PROCEDURE — 99204 OFFICE O/P NEW MOD 45 MIN: CPT | Performed by: ORTHOPAEDIC SURGERY

## 2018-09-10 PROCEDURE — 73030 X-RAY EXAM OF SHOULDER: CPT | Performed by: ORTHOPAEDIC SURGERY

## 2018-09-10 RX ADMIN — METHYLPREDNISOLONE ACETATE 160 MG: 80 INJECTION, SUSPENSION INTRA-ARTICULAR; INTRALESIONAL; INTRAMUSCULAR; SOFT TISSUE at 15:44

## 2018-09-10 RX ADMIN — LIDOCAINE HYDROCHLORIDE 2 ML: 20 INJECTION, SOLUTION EPIDURAL; INFILTRATION; INTRACAUDAL; PERINEURAL at 15:44

## 2018-09-10 NOTE — PROGRESS NOTES
Patient: Siobhan Dueñas    YOB: 1935    Medical Record Number: [id number]    Chief Complaints:   Right shoulder pain, weakness    History of Present Illness:     82 y.o. female patient who presents with right shoulder pain and weakness.  She reports a long history of problems with the shoulder but this seemed to get worse about a month ago.  She does not recall any specific inciting event or factor.  She describes moderate to severe constant aching pain in the shoulder and limited movement.  She has also noticed an anterior soft tissue swelling.  She has a history of myeloma and was concerned about the possibility of malignancy in the shoulder.  She apparently had an MRI as well.  She denies any shooting pain down the arm, distal weakness, numbness or paresthesias.  Denies any other associated complaints or issues.    Allergies:   Allergies   Allergen Reactions   • Hydrocodone Nausea And Vomiting and Swelling     Mouth swelling   • Hydromorphone Swelling   • Morphine And Related Nausea And Vomiting   • Oxycodone Swelling   • Tramadol Nausea And Vomiting   • Atenolol    • Codeine    • Diovan [Valsartan] Dizziness and Cough     Other reaction(s): Cough, Dizziness   • Gabapentin Confusion and Other (See Comments)       Home Medications:    Current Outpatient Prescriptions:   •  Acetaminophen (TYLENOL 8 HOUR PO), Take 650 mg by mouth Take As Directed. 5x per day   2 in am, 1 at lunch, 2 pm, Disp: , Rfl:   •  albuterol (PROVENTIL HFA;VENTOLIN HFA) 108 (90 Base) MCG/ACT inhaler, Inhale 2 puffs., Disp: , Rfl:   •  allopurinol (ZYLOPRIM) 100 MG tablet, TAKE ONE TABLET BY MOUTH DAILY, Disp: 90 tablet, Rfl: 0  •  amLODIPine (NORVASC) 5 MG tablet, Take 5 mg by mouth Every Morning. Pt started on Monday 9/3, Disp: , Rfl:   •  amLODIPine (NORVASC) 5 MG tablet, Take 5 mg by mouth., Disp: , Rfl:   •  buPROPion XL (WELLBUTRIN XL) 150 MG 24 hr tablet, Take 1 tablet by mouth Every Morning., Disp: 30 tablet, Rfl: 5  •   carvedilol (COREG) 25 MG tablet, Take 25 mg by mouth 2 (Two) Times a Day With Meals., Disp: , Rfl:   •  clopidogrel (PLAVIX) 75 MG tablet, TAKE TAKE ONE TABLET BY MOUTH DAILY, Disp: 90 tablet, Rfl: 0  •  Cobalamine Combinations (B-12) 100-5000 MCG sublingual tablet, Place 3,000 mcg under the tongue., Disp: , Rfl:   •  famotidine (PEPCID) 20 MG tablet, Take 20 mg by mouth Daily., Disp: , Rfl:   •  hydrALAZINE (APRESOLINE) 50 MG tablet, , Disp: , Rfl:   •  Multiple Vitamins-Minerals (CENTRUM PO), Take  by mouth Daily., Disp: , Rfl:   •  vitamin C (ASCORBIC ACID) 500 MG tablet, Take 500 mg by mouth 2 (Two) Times a Day., Disp: , Rfl:   •  vitamin D (ERGOCALCIFEROL) 34356 units capsule capsule, Take 1 capsule by mouth 1 (One) Time Per Week., Disp: 4 capsule, Rfl: 11    Past Medical History:   Diagnosis Date   • Acute kidney failure (CMS/HCC)    • Anemia    • Anxiety    • Arthritis    • Asthma    • Cancer (CMS/HCC) 2004    Left kidney tumor   • Cataract    • CHF (congestive heart failure) (CMS/HCC)    • Chronic kidney disease    • COPD (chronic obstructive pulmonary disease) (CMS/HCC)    • Diabetes mellitus (CMS/HCC)     Type 2   • GERD (gastroesophageal reflux disease)    • H/O Chronic systolic heart failure 02/2018   • H/O Gout    • H/O LV dysfunction    • H/O NICM (nonischemic cardiomyopathy) 2017   • H/O PONV (postoperative nausea and vomiting)    • H/O Syncope and collapse 2015   • H/O Valvular disease 2015    Mitral/Tricuspid   • Hyperlipidemia    • Hypertension    • Hyponatremia 02/2018   • Infectious viral hepatitis    • LBBB (left bundle branch block)    • Liver disease    • Myeloma (CMS/HCC)    • Osteoporosis    • Pacemaker    • Shortness of breath    • Stroke (CMS/HCC)     Memory loss   • TIA (transient ischemic attack)        Past Surgical History:   Procedure Laterality Date   • CARDIAC CATHETERIZATION      mild dz, no stents   • CATARACT EXTRACTION     • CHOLECYSTECTOMY     • COLONOSCOPY  12/2005    Polyps  x4   • EYE SURGERY      Implants   • FOOT SURGERY Right     orif    • HEMORRHOIDECTOMY     • JOINT REPLACEMENT Right 2011   • KIDNEY SURGERY     • NEPHRECTOMY Left 11/2004   • ORIF ANKLE FRACTURE Right 02/2018    Trimalleolar; delayed healing   • PACEMAKER IMPLANTATION  05/29/2015    Medtronic dual chamber/Dr. Weber       Social History     Occupational History   •  Retired     Social History Main Topics   • Smoking status: Never Smoker   • Smokeless tobacco: Never Used   • Alcohol use No   • Drug use: No   • Sexual activity: Not Currently     Partners: Male      Social History     Social History Narrative   • No narrative on file       Family History   Problem Relation Age of Onset   • Heart disease Mother    • Hypertension Mother    • Heart disease Father    • Hypertension Father    • Ovarian cancer Sister 50   • Dementia Sister    • Hypertension Sister    • Heart disease Sister    • Liver disease Sister    • Drug abuse Sister    • Stroke Sister    • Early death Sister    • Heart disease Brother    • Hypertension Brother    • Diabetes Other    • Anemia Other        Review of Systems:      Constitutional: Denies fever, shaking or chills   Eyes: Denies change in visual acuity   HEENT: Denies nasal congestion or sore throat   Respiratory: Denies cough or shortness of breath   Cardiovascular: Denies chest pain or edema  Endocrine: Denies tremors, palpitations, intolerance of heat or cold, polyuria, polydipsia.  GI: Denies abdominal pain, nausea, vomiting, bloody stools or diarrhea  : Denies frequency, urgency, incontinence, retention, or nocturia.  Musculoskeletal: Denies numbness, tingling or loss of motor function except as above  Integument: Denies rash, lesion or ulceration   Neurologic: Denies headache or focal weakness, deficits  Heme: Denies spontaneous or excessive bleeding, epistaxis, hematuria, melena, fatigue, enlarged or tender lymph nodes.      All other pertinent positives and negatives as noted above  "in HPI.    Physical Exam: 82 y.o. female  Vitals:    09/10/18 1120   Temp: 97.3 °F (36.3 °C)   TempSrc: Temporal Artery    Weight: 84.8 kg (187 lb)   Height: 162.6 cm (64\")     General:  Patient is awake and alert.  Appears in no acute distress or discomfort.    Psych:  Affect and demeanor are appropriate.    Eyes:  Conjunctiva and sclera appear grossly normal.  Eyes track well and EOM seem to be intact.    Ears:  No gross abnormalities.  Hearing adequate for the exam.    Cardiovascular:  Regular rate and rhythm.    Lungs:  Good chest expansion.  Breathing unlabored.    Lymph:  No palpable adenopathy about neck or axilla.    Neck:  Supple.  Normal ROM.  Negative Spurling's for shoulder or arm pain.    Right upper extremity:  Skin benign and intact without evidence for swelling, masses or atrophy.  There is a palpable mass anteriorly which is soft and easily mobile.  No focal areas of exquisite tenderness.  Full active ROM.  No evident instability or apprehension.  Positive Moraes impingement maneuvers.  Negative Speeds, Yergason's and active compression maneuvers.  Pain and weakness with resistive testing of elevation in scapular plane and external rotation.  Negative ER lag sign.  Good strength in wrist and hand.  Intact sensation in arm, hand.  Palpable radial pulse with brisk cap refill.         Radiology:   AP, scapular Y, and axillary views of the right shoulder are ordered by myself and reviewed to evaluate the patient's complaint.  No comparison films are immediately available.  The x-rays show moderate to severe glenohumeral osteoarthritis with joint space narrowing, osteophyte formation and subchondral sclerosis.  The acromiohumeral interval is normal.  Glenoid version appears normal as well.  MRI of the right shoulder is reviewed along with the associated report.  She has advanced glenohumeral arthritis, a full-thickness supraspinatus tendon tear, distention of the subacromial/subdeltoid bursa and a large " fluid collection in this area which appears to be consistent with inflammation from the joint.  There is no evidence for septic arthritis, malignancy or other pathologic process.    Assessment/Plan:   1.  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  History of multiple myeloma    I do not think that this mass represents myeloma.  It appears to be a cyst coming from the combination of her arthritis and the rotator cuff tear.  I recommend that we keep an eye on this for progressive enlargement but manage her conservatively.  I suggested that we try an injection.  The risks, benefits and alternatives were discussed and she consented.  Under sterile conditions without palpitation injection was performed as described below.  I can repeat the injection in 3 months.  If she fails to respond to the injection, we may have to consider surgical options.  If she notices any progressive enlargement of the cyst, I told her that I want to see her back.    Large Joint Arthrocentesis  Date/Time: 9/10/2018 3:44 PM  Consent given by: patient  Site marked: site marked  Timeout: Immediately prior to procedure a time out was called to verify the correct patient, procedure, equipment, support staff and site/side marked as required   Supporting Documentation  Indications: pain   Procedure Details  Location: shoulder - R glenohumeral  Preparation: Patient was prepped and draped in the usual sterile fashion  Needle gauge: 21 g.  Approach: anterior  Medications administered: 2 mL lidocaine PF 2% 2 %; 160 mg methylPREDNISolone acetate 80 MG/ML  Patient tolerance: patient tolerated the procedure well with no immediate complications        Jose Jarvis MD    09/10/2018    CC to Marino Higgins MD

## 2018-09-11 RX ORDER — LIDOCAINE HYDROCHLORIDE 20 MG/ML
2 INJECTION, SOLUTION EPIDURAL; INFILTRATION; INTRACAUDAL; PERINEURAL
Status: COMPLETED | OUTPATIENT
Start: 2018-09-10 | End: 2018-09-10

## 2018-09-11 RX ORDER — METHYLPREDNISOLONE ACETATE 80 MG/ML
160 INJECTION, SUSPENSION INTRA-ARTICULAR; INTRALESIONAL; INTRAMUSCULAR; SOFT TISSUE
Status: COMPLETED | OUTPATIENT
Start: 2018-09-10 | End: 2018-09-10

## 2018-10-03 RX ORDER — ALLOPURINOL 100 MG/1
TABLET ORAL
Qty: 90 TABLET | Refills: 0 | Status: SHIPPED | OUTPATIENT
Start: 2018-10-03 | End: 2018-12-31 | Stop reason: SDUPTHER

## 2018-10-03 RX ORDER — CLOPIDOGREL BISULFATE 75 MG/1
TABLET ORAL
Qty: 90 TABLET | Refills: 0 | Status: SHIPPED | OUTPATIENT
Start: 2018-10-03 | End: 2018-12-31 | Stop reason: SDUPTHER

## 2018-11-28 ENCOUNTER — LAB (OUTPATIENT)
Dept: OTHER | Facility: HOSPITAL | Age: 83
End: 2018-11-28

## 2018-11-28 DIAGNOSIS — D47.2 MONOCLONAL GAMMOPATHY: ICD-10-CM

## 2018-11-28 LAB
ALBUMIN SERPL-MCNC: 4.3 G/DL (ref 3.5–5.2)
ALBUMIN/GLOB SERPL: 1 G/DL
ALP SERPL-CCNC: 78 U/L (ref 39–117)
ALT SERPL W P-5'-P-CCNC: 8 U/L (ref 1–33)
ANION GAP SERPL CALCULATED.3IONS-SCNC: 11.6 MMOL/L
AST SERPL-CCNC: 15 U/L (ref 1–32)
BASOPHILS # BLD AUTO: 0.03 10*3/MM3 (ref 0–0.2)
BASOPHILS NFR BLD AUTO: 0.5 % (ref 0–1.5)
BILIRUB SERPL-MCNC: 0.4 MG/DL (ref 0.1–1.2)
BUN BLD-MCNC: 19 MG/DL (ref 8–23)
BUN/CREAT SERPL: 17 (ref 7–25)
CALCIUM SPEC-SCNC: 9.6 MG/DL (ref 8.6–10.5)
CHLORIDE SERPL-SCNC: 100 MMOL/L (ref 98–107)
CO2 SERPL-SCNC: 26.4 MMOL/L (ref 22–29)
CREAT BLD-MCNC: 1.12 MG/DL (ref 0.57–1)
DEPRECATED RDW RBC AUTO: 41.1 FL (ref 37–54)
EOSINOPHIL # BLD AUTO: 0.02 10*3/MM3 (ref 0–0.7)
EOSINOPHIL NFR BLD AUTO: 0.4 % (ref 0.3–6.2)
ERYTHROCYTE [DISTWIDTH] IN BLOOD BY AUTOMATED COUNT: 11.9 % (ref 11.7–13)
GFR SERPL CREATININE-BSD FRML MDRD: 47 ML/MIN/1.73
GLOBULIN UR ELPH-MCNC: 4.3 GM/DL
GLUCOSE BLD-MCNC: 107 MG/DL (ref 65–99)
HCT VFR BLD AUTO: 37.6 % (ref 35.6–45.5)
HGB BLD-MCNC: 12.8 G/DL (ref 11.9–15.5)
IMM GRANULOCYTES # BLD: 0.01 10*3/MM3 (ref 0–0.03)
IMM GRANULOCYTES NFR BLD: 0.2 % (ref 0–0.5)
LYMPHOCYTES # BLD AUTO: 0.7 10*3/MM3 (ref 0.9–4.8)
LYMPHOCYTES NFR BLD AUTO: 12.6 % (ref 19.6–45.3)
MCH RBC QN AUTO: 31.8 PG (ref 26.9–32)
MCHC RBC AUTO-ENTMCNC: 34 G/DL (ref 32.4–36.3)
MCV RBC AUTO: 93.3 FL (ref 80.5–98.2)
MONOCYTES # BLD AUTO: 0.49 10*3/MM3 (ref 0.2–1.2)
MONOCYTES NFR BLD AUTO: 8.8 % (ref 5–12)
NEUTROPHILS # BLD AUTO: 4.29 10*3/MM3 (ref 1.9–8.1)
NEUTROPHILS NFR BLD AUTO: 77.5 % (ref 42.7–76)
NRBC BLD MANUAL-RTO: 0 /100 WBC (ref 0–0)
PLATELET # BLD AUTO: 200 10*3/MM3 (ref 140–500)
PMV BLD AUTO: 8.1 FL (ref 6–12)
POTASSIUM BLD-SCNC: 4.6 MMOL/L (ref 3.5–5.2)
PROT SERPL-MCNC: 8.6 G/DL (ref 6–8.5)
RBC # BLD AUTO: 4.03 10*6/MM3 (ref 3.9–5.2)
SODIUM BLD-SCNC: 138 MMOL/L (ref 136–145)
WBC NRBC COR # BLD: 5.54 10*3/MM3 (ref 4.5–10.7)

## 2018-11-28 PROCEDURE — 80053 COMPREHEN METABOLIC PANEL: CPT | Performed by: INTERNAL MEDICINE

## 2018-11-28 PROCEDURE — 36415 COLL VENOUS BLD VENIPUNCTURE: CPT

## 2018-11-28 PROCEDURE — 86334 IMMUNOFIX E-PHORESIS SERUM: CPT | Performed by: INTERNAL MEDICINE

## 2018-11-28 PROCEDURE — 84165 PROTEIN E-PHORESIS SERUM: CPT | Performed by: INTERNAL MEDICINE

## 2018-11-28 PROCEDURE — 85025 COMPLETE CBC W/AUTO DIFF WBC: CPT | Performed by: INTERNAL MEDICINE

## 2018-11-28 PROCEDURE — 82784 ASSAY IGA/IGD/IGG/IGM EACH: CPT | Performed by: INTERNAL MEDICINE

## 2018-11-28 PROCEDURE — 83883 ASSAY NEPHELOMETRY NOT SPEC: CPT | Performed by: INTERNAL MEDICINE

## 2018-11-29 LAB
ALBUMIN SERPL-MCNC: 3.7 G/DL (ref 2.9–4.4)
ALBUMIN/GLOB SERPL: 0.9 {RATIO} (ref 0.7–1.7)
ALPHA1 GLOB FLD ELPH-MCNC: 0.3 G/DL (ref 0–0.4)
ALPHA2 GLOB SERPL ELPH-MCNC: 0.9 G/DL (ref 0.4–1)
B-GLOBULIN SERPL ELPH-MCNC: 1 G/DL (ref 0.7–1.3)
GAMMA GLOB SERPL ELPH-MCNC: 2 G/DL (ref 0.4–1.8)
GLOBULIN SER CALC-MCNC: 4.3 G/DL (ref 2.2–3.9)
IGA SERPL-MCNC: 92 MG/DL (ref 64–422)
IGG SERPL-MCNC: 1941 MG/DL (ref 700–1600)
IGM SERPL-MCNC: 38 MG/DL (ref 26–217)
INTERPRETATION SERPL IEP-IMP: ABNORMAL
KAPPA LC SERPL-MCNC: 20.2 MG/L (ref 3.3–19.4)
KAPPA LC/LAMBDA SER: 1.7 {RATIO} (ref 0.26–1.65)
LAMBDA LC FREE SERPL-MCNC: 11.9 MG/L (ref 5.7–26.3)
Lab: ABNORMAL
M-SPIKE: 1.8 G/DL
PROT SERPL-MCNC: 8 G/DL (ref 6–8.5)

## 2018-12-05 ENCOUNTER — OFFICE VISIT (OUTPATIENT)
Dept: ONCOLOGY | Facility: CLINIC | Age: 83
End: 2018-12-05

## 2018-12-05 ENCOUNTER — APPOINTMENT (OUTPATIENT)
Dept: OTHER | Facility: HOSPITAL | Age: 83
End: 2018-12-05

## 2018-12-05 VITALS
TEMPERATURE: 98.6 F | BODY MASS INDEX: 32.95 KG/M2 | OXYGEN SATURATION: 98 % | WEIGHT: 193 LBS | DIASTOLIC BLOOD PRESSURE: 78 MMHG | RESPIRATION RATE: 18 BRPM | SYSTOLIC BLOOD PRESSURE: 120 MMHG | HEIGHT: 64 IN | HEART RATE: 86 BPM

## 2018-12-05 DIAGNOSIS — D47.2 MONOCLONAL GAMMOPATHY: Primary | ICD-10-CM

## 2018-12-05 PROCEDURE — G0463 HOSPITAL OUTPT CLINIC VISIT: HCPCS | Performed by: INTERNAL MEDICINE

## 2018-12-05 PROCEDURE — 99213 OFFICE O/P EST LOW 20 MIN: CPT | Performed by: INTERNAL MEDICINE

## 2018-12-05 NOTE — PROGRESS NOTES
Subjective recent studies reviewed    REASON FOR CONSULTATION: Postoperative anemia,?  Myeloma  Provide an opinion on any further workup or treatment                             REQUESTING PHYSICIAN: Marino Higgins MD      History of Present Illness         The patient is an 83-year-old female with a history of essential hypertension, chronic systolic heart failure, diabetes, hyperlipidemia and COPD who experienced a try malleolar ankle fracture on the right admitted February 26 through March 02, 2018.  She evidently has sustained a fracture in January of this year.     She has a significant history that her daughter describes as being told of a right frontal bone lesion at [facility].  Review of [facility] care everywhere does include a CT scan of the brain in 2015 significant for lytic right frontal bone lesion that's nonspecific but raised the possibility of myeloma or metastatic disease.  The patient has a significant additional history of renal cell carcinoma with resection many years ago and this led to a whole body bone scan May 29, 2015 with no abnormal uptake in the right frontal bone again leaving the possibility of a myelomatous lesion or benign skull lesion.  Further history includes a review by Dr. Barnard in June 2016 for numerable tiny noncalcified pulmonary nodules that are felt to be sequela level granulomatous lung disease but no significant change seen on several CAT scans.     The patient, thereafter, head evaluations at [facility] for cardiovascular issues including adjustment of her medications, treatment for LV dysfunction admission for weakness associated with hyponatremia in February-medication related.  This in part evidently lead to a right trimalleolar ankle fracture that required repair on February 26, later development of gabapentin and treatment-induced encephalopathy which improving medication was discontinued.  Later unexpectedly in mid May she had further pain down her  left foot revealing a corner avulsion fracture at the base the middle phalanx second digit-?  Etiology and further as result of chronic back pain thoracic films revealing no acute osseous abnormality, multilevel degenerative disc disease.  There is no mention of potential myelomatous lesions.  As she is seen with her daughter May 21, 2018 they both indicate that she's been reviewed by neurology memory dysfunction  and now also struggles with a progressive dementia.     She was seen by her primary physician May 8 again for follow-up of her fracture as well as CKD stage I thank you blood loss is causing postoperative anemia.  Her studies noted March 18 included H&H of 9.5 and 30.9 with a white count of 5960, MCV of 92.0, MCH 28.3, MCHC of 30.7, platelet count of 185,000 and normal automated differential, creatinine of 0.94 with BUN of 12 and EGFR of 57.  Her follow-up H&H May 8 were 11.9 and 37.1 white count of 4810,, platelet count of 213,000 with MCV of 90.9, MCH of 29.2 and MCHC 32.1.  Additional exams revealed an albumin of 3.3, gammaglobulin of 1.9, M spike of 1.7, globulin of 4.1.  We are asked to see her May 21 for her slow recovery from postoperative anemia but recognizes her history of hyponatremia, fluid restriction and gradual recovery to her current hemoglobin of above 12 g percent suggestingcomponent hemodilution ongoing previously.  There is however her monoclonal gammopathy and a history has once again reviewed concerning this MGUS.     The patient underwent a series of studies including BUN and creatinine of 22 and 1.17, total protein of 8.9, sodium of 139, ferritin of 45.7, iron is 79, TIBC of 367, 22% saturation, B12 greater than 2000, MMA of 152 beta-2 microglobulin 3.1 and paraprotein studies-IgG of 2069, IgA of 88, IgM 41, M spike of 1.8 and light chains with free light kappa 24.1, free lambda light chain 14.9 with a ratio of 1.62.  These studies would be consistent with a monoclonal gammopathy of  unknown significance.  We discussed these findings in great detail and the patient is seen June 01, 2018.  She and her daughter are quite concerned about her cognitive decline and discusses with supportive oncology- Rena Rebolledo-with plans for the patient to be seen at the [facility] in the next 4 weeks.    The patient is follow-up August 20, 2018.  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  Additional exams included her proteins with an IgG of 2122, IgA 98, IgM of 53, M spike of 2.3 and CBC with H&H of 12.4 and 40.5 white count of 4620 and platelet count of 165,000.    The patient is next seen August 20, 2018.  Her exams do not suggest progression of myeloma though she has developed a swelling in her right shoulder fairly acutely that's limiting motion.  She was not able to be seen by geriatric physician and unfortunately is going to be seen by neurologist at [facility] for assessment of dementia.    The patient went on to have an assessment with MRI of the right shoulder showing a supraspinatus tear and enlargement of the bursa.  This is becoming quite painful for her and we discussed orthopedic assessment.  There is, fortunately, no evidence of malignancy involved here.       The patient is next seen December 05, 2018 with repeat paraprotein is included IgG of 1941, M spike of 1.8, kappa/lambda ratio of 1.7, normal CBC, CMP with creatinine 1.12, BUN 19 and total protein 8.6.  The studies are not significant change from previous.  We have also discussed reassessment per orthopedics and follow-up thereafter.    Past Medical History:   Diagnosis Date   • Acute kidney failure (CMS/HCC)    • Anemia    • Anxiety    • Arthritis    • Asthma    • Cancer (CMS/HCC) 2004    Left kidney tumor   • Cataract    • CHF (congestive heart failure) (CMS/HCC)    • Chronic kidney disease    • COPD  (chronic obstructive pulmonary disease) (CMS/HCC)    • Diabetes mellitus (CMS/HCC)     Type 2   • GERD (gastroesophageal reflux disease)    • H/O Chronic systolic heart failure 02/2018   • H/O Gout    • H/O LV dysfunction    • H/O NICM (nonischemic cardiomyopathy) 2017   • H/O PONV (postoperative nausea and vomiting)    • H/O Syncope and collapse 2015   • H/O Valvular disease 2015    Mitral/Tricuspid   • Hyperlipidemia    • Hypertension    • Hyponatremia 02/2018   • Infectious viral hepatitis    • LBBB (left bundle branch block)    • Liver disease    • Myeloma (CMS/HCC)    • Osteoporosis    • Pacemaker    • Shortness of breath    • Stroke (CMS/HCC)     Memory loss   • TIA (transient ischemic attack)         Past Surgical History:   Procedure Laterality Date   • CARDIAC CATHETERIZATION      mild dz, no stents   • CATARACT EXTRACTION     • CHOLECYSTECTOMY     • COLONOSCOPY  12/2005    Polyps x4   • EYE SURGERY      Implants   • FOOT SURGERY Right     orif    • HEMORRHOIDECTOMY     • JOINT REPLACEMENT Right 2011   • KIDNEY SURGERY     • NEPHRECTOMY Left 11/2004   • ORIF ANKLE FRACTURE Right 02/2018    Trimalleolar; delayed healing   • PACEMAKER IMPLANTATION  05/29/2015    [employer] dual chamber/Dr. Weber        Current Outpatient Medications on File Prior to Visit   Medication Sig Dispense Refill   • Acetaminophen (TYLENOL 8 HOUR PO) Take 650 mg by mouth Take As Directed. 5x per day   2 in am, 1 at lunch, 2 pm     • albuterol (PROVENTIL HFA;VENTOLIN HFA) 108 (90 Base) MCG/ACT inhaler Inhale 2 puffs.     • allopurinol (ZYLOPRIM) 100 MG tablet TAKE ONE TABLET BY MOUTH DAILY 90 tablet 0   • amLODIPine (NORVASC) 5 MG tablet Take 5 mg by mouth Every Morning. Pt started on Monday 9/3     • buPROPion XL (WELLBUTRIN XL) 150 MG 24 hr tablet Take 1 tablet by mouth Every Morning. 30 tablet 5   • carvedilol (COREG) 25 MG tablet Take 25 mg by mouth 2 (Two) Times a Day With Meals.     • clopidogrel (PLAVIX) 75 MG tablet TAKE TAKE  ONE TABLET BY MOUTH DAILY 90 tablet 0   • famotidine (PEPCID) 20 MG tablet Take 20 mg by mouth Daily.     • hydrALAZINE (APRESOLINE) 50 MG tablet Take 50 mg by mouth 3 (Three) Times a Day.     • Multiple Vitamins-Minerals (CENTRUM PO) Take  by mouth Daily.     • vitamin D (ERGOCALCIFEROL) 40285 units capsule capsule Take 1 capsule by mouth 1 (One) Time Per Week. 4 capsule 11   • amLODIPine (NORVASC) 5 MG tablet Take 5 mg by mouth.     • Cobalamine Combinations (B-12) 100-5000 MCG sublingual tablet Place 3,000 mcg under the tongue.     • vitamin C (ASCORBIC ACID) 500 MG tablet Take 500 mg by mouth 2 (Two) Times a Day.       No current facility-administered medications on file prior to visit.         ALLERGIES:    Allergies   Allergen Reactions   • Hydrocodone Nausea And Vomiting and Swelling     Mouth swelling   • Hydromorphone Swelling   • Morphine And Related Nausea And Vomiting   • Oxycodone Swelling   • Tramadol Nausea And Vomiting   • Atenolol    • Codeine    • Diovan [Valsartan] Dizziness and Cough     Other reaction(s): Cough, Dizziness   • Gabapentin Confusion and Other (See Comments)        Social History     Socioeconomic History   • Marital status:      Spouse name: Not on file   • Number of children: 2   • Years of education: High School   • Highest education level: Not on file   Occupational History     Employer: RETIRED   Tobacco Use   • Smoking status: Never Smoker   • Smokeless tobacco: Never Used   Substance and Sexual Activity   • Alcohol use: No   • Drug use: No   • Sexual activity: Not Currently     Partners: Male        Family History   Problem Relation Age of Onset   • Heart disease Mother    • Hypertension Mother    • Heart disease Father    • Hypertension Father    • Ovarian cancer Sister 50   • Dementia Sister    • Hypertension Sister    • Heart disease Sister    • Liver disease Sister    • Drug abuse Sister    • Stroke Sister    • Early death Sister    • Heart disease Brother    •  "Hypertension Brother    • Diabetes Other    • Anemia Other         Review of Systems   Gastrointestinal: Negative for constipation, diarrhea, nausea and vomiting.      Gen.: Variable weight loss, chills, night sweats, fatigue, cold sensitivity  ENT: Progressive deafness bilaterally  Respiratory: Chronic cough and shortness of breath  Gastrointestinal: Poor appetite, chronic indigestion, periodic diarrhea  Genitourinary: Increasing nocturia  Skeletal: Chronic back pain, inflamed joints for many years  Neurologic: Headaches, dizziness, difficulty in ambulation    Objective     Vitals:    12/05/18 1413   BP: 120/78   Pulse: 86   Resp: 18   Temp: 98.6 °F (37 °C)   TempSrc: Oral   SpO2: 98%   Weight: 87.5 kg (193 lb)   Height: 162.6 cm (64\")   PainSc:   7   PainLoc: Comment: leg, hip, arm and foot pain     Current Status 12/5/2018   ECOG score 1       Physical Exam  repeat physical exam June 01, 2018  Constitutional: She appears well-developed and well-nourished.   Cardiovascular: Normal rate.    Pulmonary/Chest: Effort normal.  Clear bilaterally to auscultation and percussion   Cardiovascular: Normal rate regular rhythm without murmur or gallop  Abdomen: Soft, nontender, moderately obese without hepatosplenomegaly or ascites   Musculoskeletal: Right ankle: She exhibits decreased range of motion and swelling. Tenderness. Thoracic back: She exhibits bony tenderness.  Left foot: There is decreased range of motion, tenderness and bony tenderness.    Further examination of right shoulder reveals a bulbous protuberance measuring approximately 5 cm across nonerythematous, nontender, movable with a rubbery consistency is unchanged from previous    RECENT LABS:  Hematology WBC   Date Value Ref Range Status   11/28/2018 5.54 4.50 - 10.70 10*3/mm3 Final     RBC   Date Value Ref Range Status   11/28/2018 4.03 3.90 - 5.20 10*6/mm3 Final   05/08/2018 4.08 (L) 4.20 - 5.40 10*6/mm3 Final     Hemoglobin   Date Value Ref Range Status "   11/28/2018 12.8 11.9 - 15.5 g/dL Final     Hematocrit   Date Value Ref Range Status   11/28/2018 37.6 35.6 - 45.5 % Final     Platelets   Date Value Ref Range Status   11/28/2018 200 140 - 500 10*3/mm3 Final      MRI OF THE BRAIN WITH AND WITHOUT CONTRAST - 08/31/2018  IMPRESSION:  1. Since prior MRI of the brain at [facility] on  05/23/2015, there has been slight progression in the mild-to-moderate  small vessel disease in the cerebral and central pontine white matter;  otherwise there is no change, there remains chronic partial  opacification of the left mastoid air cells with fluid  2. The remainder of MRI of the head is within normal limits with no  evidence of metastatic disease of the head.      RIGHT SHOULDER MRI WITH AND WITHOUT CONTRAST August 31, 2018  IMPRESSION:  Advanced osteoarthritic change at the right glenohumeral  joint with a small (4 mm wide), full-thickness, posterior supraspinatus  tendon tear. An effusion in the glenohumeral joint and distending the  subacromial subdeltoid bursa is associated with some synovial thickening  and enhancement as would be expected given the advanced arthritic  change. The distended bursa may be palpable as an area of swelling over  the anterolateral aspect of the shoulder. There is no evidence of tumor  around the shoulder.        Assessment/Plan    .  The patient is an 83-year-old female with a somewhat complex history including hypertension, systolic heart failure, diabetes, hyperlipidemia, COPD, slowly progressive memory dysfunction and recent development of further complications including trimalleolar fracture on the right with admission later in February to March for repair at which time she was experiencing hyponatremia from Celexa use.  After surgery she was transferred to a nursing facility where this medication, unfortunately, continued as did her hyponatremia and there is been some time spent trying to improve this issue for her.   Following this without, she injured her left foot and had sustained an avulsion fracture at the base of the middle phalanx.  This too has been addressed orthopedically and she is slowly recovering.  All of this is somewhat complicated by her neurologic decline with memory dysfunction and apparent dementia.    As she was slow in recovery for her anemia status after her most recent surgery we are asked to see her in consultation finding that she has improved to nearly normal hemoglobin and hematocrit levels but does have a monoclonal gammopathy with a history, as described, of a potential skull lesion in 2015.  Her subsequent plain films do not demonstrate other lytic lesions including recently performed thoracic spine films.  We've discussed this may have occurred on the basis of progressive osteoporosis rather than myeloma and that the patient's anemia may well be improved as result of previous hemodilution-secondary to medication induced SIADH.  After considerable discussion with the patient and her daughter we went on to assess as described above revealing evidence of osteopenia and MGUS.  As the patient is seen June 1 she is stable hematologically without progression of anemia further.  After additional 30 minutes discussion we obtain follow-up laboratory exams and bone survey that failed to show any significant progression of disease.  The patient's back pain appears to have stabilized though she is having right shoulder abnormalities that, while consistent with lipoma, should be assessed under the circumstances and we plan an MRI of the right shoulder.plan.we proceeded with MRI of the right shoulder showed evidence of a right supraspinatus tear and bursa enlargement.  She will need an orthopedic assessment concerning this and will help schedule that for her. The patient was seen by orthopedics and treated symptomatically as follows with Dr. Boswell at this point.  We do not find progression of her MGUS we will  assess her in approximately 6 months.  Please note that neurocognitive testing is also scheduled December 11.  Plan:  *Repeat paraprotein studies in 22 weeks  *Follow-up reassessment 24 weeks.  *Orthopedic follow-up as scheduled

## 2018-12-10 ENCOUNTER — CLINICAL SUPPORT (OUTPATIENT)
Dept: ORTHOPEDIC SURGERY | Facility: CLINIC | Age: 83
End: 2018-12-10

## 2018-12-10 VITALS — WEIGHT: 193 LBS | BODY MASS INDEX: 32.95 KG/M2 | HEIGHT: 64 IN

## 2018-12-10 DIAGNOSIS — M25.511 CHRONIC RIGHT SHOULDER PAIN: Primary | ICD-10-CM

## 2018-12-10 DIAGNOSIS — G89.29 CHRONIC RIGHT SHOULDER PAIN: Primary | ICD-10-CM

## 2018-12-10 PROCEDURE — 99212 OFFICE O/P EST SF 10 MIN: CPT | Performed by: ORTHOPAEDIC SURGERY

## 2018-12-10 PROCEDURE — 20610 DRAIN/INJ JOINT/BURSA W/O US: CPT | Performed by: ORTHOPAEDIC SURGERY

## 2018-12-10 RX ORDER — METHYLPREDNISOLONE ACETATE 80 MG/ML
80 INJECTION, SUSPENSION INTRA-ARTICULAR; INTRALESIONAL; INTRAMUSCULAR; SOFT TISSUE
Status: COMPLETED | OUTPATIENT
Start: 2018-12-10 | End: 2018-12-10

## 2018-12-10 RX ORDER — LIDOCAINE HYDROCHLORIDE 20 MG/ML
2 INJECTION, SOLUTION EPIDURAL; INFILTRATION; INTRACAUDAL; PERINEURAL
Status: COMPLETED | OUTPATIENT
Start: 2018-12-10 | End: 2018-12-10

## 2018-12-10 RX ADMIN — LIDOCAINE HYDROCHLORIDE 2 ML: 20 INJECTION, SOLUTION EPIDURAL; INFILTRATION; INTRACAUDAL; PERINEURAL at 11:46

## 2018-12-10 RX ADMIN — METHYLPREDNISOLONE ACETATE 80 MG: 80 INJECTION, SUSPENSION INTRA-ARTICULAR; INTRALESIONAL; INTRAMUSCULAR; SOFT TISSUE at 11:46

## 2018-12-31 RX ORDER — ALLOPURINOL 100 MG/1
TABLET ORAL
Qty: 90 TABLET | Refills: 0 | Status: SHIPPED | OUTPATIENT
Start: 2018-12-31 | End: 2019-01-09 | Stop reason: SDUPTHER

## 2018-12-31 RX ORDER — CLOPIDOGREL BISULFATE 75 MG/1
TABLET ORAL
Qty: 90 TABLET | Refills: 0 | Status: SHIPPED | OUTPATIENT
Start: 2018-12-31 | End: 2019-01-09 | Stop reason: SDUPTHER

## 2019-01-01 ENCOUNTER — OFFICE VISIT (OUTPATIENT)
Dept: FAMILY MEDICINE CLINIC | Facility: CLINIC | Age: 84
End: 2019-01-01

## 2019-01-01 ENCOUNTER — HOSPITAL ENCOUNTER (INPATIENT)
Facility: HOSPITAL | Age: 84
LOS: 9 days | Discharge: HOME | End: 2019-11-23
Attending: HOSPITALIST | Admitting: HOSPITALIST

## 2019-01-01 VITALS
WEIGHT: 187 LBS | OXYGEN SATURATION: 98 % | DIASTOLIC BLOOD PRESSURE: 84 MMHG | BODY MASS INDEX: 31.92 KG/M2 | HEART RATE: 72 BPM | SYSTOLIC BLOOD PRESSURE: 142 MMHG | HEIGHT: 64 IN

## 2019-01-01 VITALS
RESPIRATION RATE: 18 BRPM | BODY MASS INDEX: 31.18 KG/M2 | OXYGEN SATURATION: 98 % | TEMPERATURE: 97.6 F | HEIGHT: 64 IN | WEIGHT: 182.6 LBS | HEART RATE: 68 BPM | DIASTOLIC BLOOD PRESSURE: 83 MMHG | SYSTOLIC BLOOD PRESSURE: 179 MMHG

## 2019-01-01 DIAGNOSIS — Z96.611 HISTORY OF RIGHT SHOULDER REPLACEMENT: ICD-10-CM

## 2019-01-01 DIAGNOSIS — Z09 HOSPITAL DISCHARGE FOLLOW-UP: Primary | ICD-10-CM

## 2019-01-01 DIAGNOSIS — D62 ACUTE BLOOD LOSS AS CAUSE OF POSTOPERATIVE ANEMIA: ICD-10-CM

## 2019-01-01 DIAGNOSIS — F39 MOOD DISORDER (HCC): ICD-10-CM

## 2019-01-01 LAB
ANION GAP SERPL CALCULATED.3IONS-SCNC: 10.9 MMOL/L (ref 5–15)
ANION GAP SERPL CALCULATED.3IONS-SCNC: 13 MMOL/L (ref 5–15)
ANION GAP SERPL CALCULATED.3IONS-SCNC: 16 MMOL/L (ref 5–15)
BASOPHILS # BLD AUTO: 0.01 10*3/MM3 (ref 0–0.2)
BASOPHILS # BLD AUTO: 0.02 10*3/MM3 (ref 0–0.2)
BASOPHILS # BLD AUTO: 0.02 10*3/MM3 (ref 0–0.2)
BASOPHILS NFR BLD AUTO: 0.2 % (ref 0–1.5)
BASOPHILS NFR BLD AUTO: 0.4 % (ref 0–1.5)
BASOPHILS NFR BLD AUTO: 0.4 % (ref 0–1.5)
BUN BLD-MCNC: 15 MG/DL (ref 8–23)
BUN BLD-MCNC: 15 MG/DL (ref 8–23)
BUN BLD-MCNC: 16 MG/DL (ref 8–23)
BUN/CREAT SERPL: 14.4 (ref 7–25)
BUN/CREAT SERPL: 14.6 (ref 7–25)
BUN/CREAT SERPL: 15.5 (ref 7–25)
CALCIUM SPEC-SCNC: 8.8 MG/DL (ref 8.6–10.5)
CALCIUM SPEC-SCNC: 8.9 MG/DL (ref 8.6–10.5)
CALCIUM SPEC-SCNC: 9 MG/DL (ref 8.6–10.5)
CHLORIDE SERPL-SCNC: 96 MMOL/L (ref 98–107)
CO2 SERPL-SCNC: 20 MMOL/L (ref 22–29)
CO2 SERPL-SCNC: 24 MMOL/L (ref 22–29)
CO2 SERPL-SCNC: 26.1 MMOL/L (ref 22–29)
CREAT BLD-MCNC: 1.03 MG/DL (ref 0.57–1)
CREAT BLD-MCNC: 1.03 MG/DL (ref 0.57–1)
CREAT BLD-MCNC: 1.04 MG/DL (ref 0.57–1)
DEPRECATED RDW RBC AUTO: 39.7 FL (ref 37–54)
DEPRECATED RDW RBC AUTO: 39.8 FL (ref 37–54)
DEPRECATED RDW RBC AUTO: 40 FL (ref 37–54)
EOSINOPHIL # BLD AUTO: 0.09 10*3/MM3 (ref 0–0.4)
EOSINOPHIL # BLD AUTO: 0.11 10*3/MM3 (ref 0–0.4)
EOSINOPHIL # BLD AUTO: 0.13 10*3/MM3 (ref 0–0.4)
EOSINOPHIL NFR BLD AUTO: 1.6 % (ref 0.3–6.2)
EOSINOPHIL NFR BLD AUTO: 2.2 % (ref 0.3–6.2)
EOSINOPHIL NFR BLD AUTO: 2.4 % (ref 0.3–6.2)
ERYTHROCYTE [DISTWIDTH] IN BLOOD BY AUTOMATED COUNT: 11.8 % (ref 12.3–15.4)
ERYTHROCYTE [DISTWIDTH] IN BLOOD BY AUTOMATED COUNT: 11.8 % (ref 12.3–15.4)
ERYTHROCYTE [DISTWIDTH] IN BLOOD BY AUTOMATED COUNT: 11.9 % (ref 12.3–15.4)
GFR SERPL CREATININE-BSD FRML MDRD: 51 ML/MIN/1.73
GLUCOSE BLD-MCNC: 93 MG/DL (ref 65–99)
GLUCOSE BLD-MCNC: 96 MG/DL (ref 65–99)
GLUCOSE BLD-MCNC: 96 MG/DL (ref 65–99)
GLUCOSE BLDC GLUCOMTR-MCNC: 104 MG/DL (ref 70–130)
GLUCOSE BLDC GLUCOMTR-MCNC: 108 MG/DL (ref 70–130)
GLUCOSE BLDC GLUCOMTR-MCNC: 112 MG/DL (ref 70–130)
GLUCOSE BLDC GLUCOMTR-MCNC: 119 MG/DL (ref 70–130)
GLUCOSE BLDC GLUCOMTR-MCNC: 119 MG/DL (ref 70–130)
GLUCOSE BLDC GLUCOMTR-MCNC: 124 MG/DL (ref 70–130)
GLUCOSE BLDC GLUCOMTR-MCNC: 94 MG/DL (ref 70–130)
GLUCOSE BLDC GLUCOMTR-MCNC: 94 MG/DL (ref 70–130)
GLUCOSE BLDC GLUCOMTR-MCNC: 96 MG/DL (ref 70–130)
GLUCOSE BLDC GLUCOMTR-MCNC: 96 MG/DL (ref 70–130)
GLUCOSE BLDC GLUCOMTR-MCNC: 98 MG/DL (ref 70–130)
GLUCOSE BLDC GLUCOMTR-MCNC: 99 MG/DL (ref 70–130)
HBA1C MFR BLD: 4.6 % (ref 4.8–5.6)
HCT VFR BLD AUTO: 32.5 % (ref 34–46.6)
HCT VFR BLD AUTO: 32.5 % (ref 34–46.6)
HCT VFR BLD AUTO: 33.1 % (ref 34–46.6)
HGB BLD-MCNC: 10.9 G/DL (ref 12–15.9)
HGB BLD-MCNC: 10.9 G/DL (ref 12–15.9)
HGB BLD-MCNC: 11 G/DL (ref 12–15.9)
IMM GRANULOCYTES # BLD AUTO: 0.02 10*3/MM3 (ref 0–0.05)
IMM GRANULOCYTES # BLD AUTO: 0.02 10*3/MM3 (ref 0–0.05)
IMM GRANULOCYTES # BLD AUTO: 0.03 10*3/MM3 (ref 0–0.05)
IMM GRANULOCYTES NFR BLD AUTO: 0.4 % (ref 0–0.5)
IMM GRANULOCYTES NFR BLD AUTO: 0.4 % (ref 0–0.5)
IMM GRANULOCYTES NFR BLD AUTO: 0.6 % (ref 0–0.5)
LARGE PLATELETS: NORMAL
LYMPHOCYTES # BLD AUTO: 0.89 10*3/MM3 (ref 0.7–3.1)
LYMPHOCYTES # BLD AUTO: 1.02 10*3/MM3 (ref 0.7–3.1)
LYMPHOCYTES # BLD AUTO: 1.1 10*3/MM3 (ref 0.7–3.1)
LYMPHOCYTES NFR BLD AUTO: 16 % (ref 19.6–45.3)
LYMPHOCYTES NFR BLD AUTO: 20.5 % (ref 19.6–45.3)
LYMPHOCYTES NFR BLD AUTO: 20.8 % (ref 19.6–45.3)
MACROCYTES BLD QL SMEAR: NORMAL
MCH RBC QN AUTO: 30.5 PG (ref 26.6–33)
MCH RBC QN AUTO: 31.1 PG (ref 26.6–33)
MCH RBC QN AUTO: 31.3 PG (ref 26.6–33)
MCHC RBC AUTO-ENTMCNC: 32.9 G/DL (ref 31.5–35.7)
MCHC RBC AUTO-ENTMCNC: 33.5 G/DL (ref 31.5–35.7)
MCHC RBC AUTO-ENTMCNC: 33.8 G/DL (ref 31.5–35.7)
MCV RBC AUTO: 92.6 FL (ref 79–97)
MCV RBC AUTO: 92.6 FL (ref 79–97)
MCV RBC AUTO: 92.7 FL (ref 79–97)
MONOCYTES # BLD AUTO: 0.44 10*3/MM3 (ref 0.1–0.9)
MONOCYTES # BLD AUTO: 0.52 10*3/MM3 (ref 0.1–0.9)
MONOCYTES # BLD AUTO: 0.52 10*3/MM3 (ref 0.1–0.9)
MONOCYTES NFR BLD AUTO: 10.6 % (ref 5–12)
MONOCYTES NFR BLD AUTO: 8.2 % (ref 5–12)
MONOCYTES NFR BLD AUTO: 9.4 % (ref 5–12)
MRSA SPEC QL CULT: NORMAL
NEUTROPHILS # BLD AUTO: 3.22 10*3/MM3 (ref 1.7–7)
NEUTROPHILS # BLD AUTO: 3.66 10*3/MM3 (ref 1.7–7)
NEUTROPHILS # BLD AUTO: 4.02 10*3/MM3 (ref 1.7–7)
NEUTROPHILS NFR BLD AUTO: 65.6 % (ref 42.7–76)
NEUTROPHILS NFR BLD AUTO: 68.1 % (ref 42.7–76)
NEUTROPHILS NFR BLD AUTO: 72.2 % (ref 42.7–76)
NRBC BLD AUTO-RTO: 0 /100 WBC (ref 0–0.2)
PLATELET # BLD AUTO: 218 10*3/MM3 (ref 140–450)
PLATELET # BLD AUTO: 252 10*3/MM3 (ref 140–450)
PLATELET # BLD AUTO: 275 10*3/MM3 (ref 140–450)
PMV BLD AUTO: 8 FL (ref 6–12)
PMV BLD AUTO: 8.1 FL (ref 6–12)
PMV BLD AUTO: 8.4 FL (ref 6–12)
POLYCHROMASIA BLD QL SMEAR: NORMAL
POTASSIUM BLD-SCNC: 3.9 MMOL/L (ref 3.5–5.2)
POTASSIUM BLD-SCNC: 3.9 MMOL/L (ref 3.5–5.2)
POTASSIUM BLD-SCNC: 4.1 MMOL/L (ref 3.5–5.2)
RBC # BLD AUTO: 3.51 10*6/MM3 (ref 3.77–5.28)
RBC # BLD AUTO: 3.51 10*6/MM3 (ref 3.77–5.28)
RBC # BLD AUTO: 3.57 10*6/MM3 (ref 3.77–5.28)
SMALL PLATELETS BLD QL SMEAR: ADEQUATE
SODIUM BLD-SCNC: 132 MMOL/L (ref 136–145)
SODIUM BLD-SCNC: 133 MMOL/L (ref 136–145)
SODIUM BLD-SCNC: 133 MMOL/L (ref 136–145)
TSH SERPL DL<=0.05 MIU/L-ACNC: 1.28 UIU/ML (ref 0.27–4.2)
VRE SPEC QL CULT: NORMAL
WBC MORPH BLD: NORMAL
WBC NRBC COR # BLD: 4.91 10*3/MM3 (ref 3.4–10.8)
WBC NRBC COR # BLD: 5.37 10*3/MM3 (ref 3.4–10.8)
WBC NRBC COR # BLD: 5.56 10*3/MM3 (ref 3.4–10.8)

## 2019-01-01 PROCEDURE — 99213 OFFICE O/P EST LOW 20 MIN: CPT | Performed by: FAMILY MEDICINE

## 2019-01-01 PROCEDURE — 97535 SELF CARE MNGMENT TRAINING: CPT

## 2019-01-01 PROCEDURE — 99315 NF DSCHRG MGMT 30 MIN/LESS: CPT | Performed by: HOSPITALIST

## 2019-01-01 PROCEDURE — 82962 GLUCOSE BLOOD TEST: CPT

## 2019-01-01 PROCEDURE — 97116 GAIT TRAINING THERAPY: CPT

## 2019-01-01 PROCEDURE — 97530 THERAPEUTIC ACTIVITIES: CPT

## 2019-01-01 PROCEDURE — 85007 BL SMEAR W/DIFF WBC COUNT: CPT | Performed by: NURSE PRACTITIONER

## 2019-01-01 PROCEDURE — 87081 CULTURE SCREEN ONLY: CPT | Performed by: HOSPITALIST

## 2019-01-01 PROCEDURE — 97110 THERAPEUTIC EXERCISES: CPT

## 2019-01-01 PROCEDURE — 83036 HEMOGLOBIN GLYCOSYLATED A1C: CPT | Performed by: NURSE PRACTITIONER

## 2019-01-01 PROCEDURE — 80048 BASIC METABOLIC PNL TOTAL CA: CPT | Performed by: NURSE PRACTITIONER

## 2019-01-01 PROCEDURE — 85025 COMPLETE CBC W/AUTO DIFF WBC: CPT | Performed by: NURSE PRACTITIONER

## 2019-01-01 PROCEDURE — 99304 1ST NF CARE SF/LOW MDM 25: CPT | Performed by: HOSPITALIST

## 2019-01-01 PROCEDURE — 97165 OT EVAL LOW COMPLEX 30 MIN: CPT

## 2019-01-01 PROCEDURE — 99304 1ST NF CARE SF/LOW MDM 25: CPT | Performed by: NURSE PRACTITIONER

## 2019-01-01 PROCEDURE — 84443 ASSAY THYROID STIM HORMONE: CPT | Performed by: NURSE PRACTITIONER

## 2019-01-01 PROCEDURE — 97161 PT EVAL LOW COMPLEX 20 MIN: CPT

## 2019-01-01 RX ORDER — DEXTROSE MONOHYDRATE 25 G/50ML
25 INJECTION, SOLUTION INTRAVENOUS
Status: DISCONTINUED | OUTPATIENT
Start: 2019-01-01 | End: 2019-01-01 | Stop reason: HOSPADM

## 2019-01-01 RX ORDER — ALLOPURINOL 100 MG/1
100 TABLET ORAL DAILY
Status: DISCONTINUED | OUTPATIENT
Start: 2019-01-01 | End: 2019-01-01 | Stop reason: HOSPADM

## 2019-01-01 RX ORDER — HYDRALAZINE HYDROCHLORIDE 25 MG/1
75 TABLET, FILM COATED ORAL 3 TIMES DAILY
Qty: 90 TABLET | Refills: 0 | Status: SHIPPED | OUTPATIENT
Start: 2019-01-01 | End: 2020-01-01 | Stop reason: HOSPADM

## 2019-01-01 RX ORDER — AMLODIPINE BESYLATE 5 MG/1
10 TABLET ORAL EVERY MORNING
Status: DISCONTINUED | OUTPATIENT
Start: 2019-01-01 | End: 2019-01-01 | Stop reason: HOSPADM

## 2019-01-01 RX ORDER — FAMOTIDINE 20 MG/1
20 TABLET, FILM COATED ORAL DAILY
Status: DISCONTINUED | OUTPATIENT
Start: 2019-01-01 | End: 2019-01-01 | Stop reason: HOSPADM

## 2019-01-01 RX ORDER — SENNOSIDES 8.6 MG
650 CAPSULE ORAL TAKE AS DIRECTED
Status: DISCONTINUED | OUTPATIENT
Start: 2019-01-01 | End: 2019-01-01

## 2019-01-01 RX ORDER — ASPIRIN 81 MG/1
81 TABLET, CHEWABLE ORAL DAILY
Status: DISCONTINUED | OUTPATIENT
Start: 2019-01-01 | End: 2019-01-01 | Stop reason: HOSPADM

## 2019-01-01 RX ORDER — L.ACID,PARA/B.BIFIDUM/S.THERM 8B CELL
1 CAPSULE ORAL DAILY
Status: DISCONTINUED | OUTPATIENT
Start: 2019-01-01 | End: 2019-01-01 | Stop reason: HOSPADM

## 2019-01-01 RX ORDER — MELATONIN
2000 DAILY
Status: DISCONTINUED | OUTPATIENT
Start: 2019-01-01 | End: 2019-01-01 | Stop reason: HOSPADM

## 2019-01-01 RX ORDER — L.ACID,PARA/B.BIFIDUM/S.THERM 8B CELL
1 CAPSULE ORAL DAILY
Qty: 5 CAPSULE | Refills: 0 | Status: SHIPPED | OUTPATIENT
Start: 2019-01-01 | End: 2019-01-01

## 2019-01-01 RX ORDER — HYDROCODONE BITARTRATE AND ACETAMINOPHEN 5; 325 MG/1; MG/1
1 TABLET ORAL EVERY 6 HOURS PRN
Status: DISCONTINUED | OUTPATIENT
Start: 2019-01-01 | End: 2019-01-01 | Stop reason: HOSPADM

## 2019-01-01 RX ORDER — HYDROCODONE BITARTRATE AND ACETAMINOPHEN 5; 325 MG/1; MG/1
1 TABLET ORAL NIGHTLY
COMMUNITY
Start: 2019-01-01 | End: 2019-01-01 | Stop reason: SDUPTHER

## 2019-01-01 RX ORDER — SENNOSIDES 8.6 MG
650 CAPSULE ORAL
Status: DISCONTINUED | OUTPATIENT
Start: 2019-01-01 | End: 2019-01-01 | Stop reason: HOSPADM

## 2019-01-01 RX ORDER — CEPHALEXIN 500 MG/1
500 CAPSULE ORAL 2 TIMES DAILY
COMMUNITY
Start: 2019-01-01 | End: 2019-01-01 | Stop reason: HOSPADM

## 2019-01-01 RX ORDER — NICOTINE POLACRILEX 4 MG
15 LOZENGE BUCCAL
Status: DISCONTINUED | OUTPATIENT
Start: 2019-01-01 | End: 2019-01-01 | Stop reason: HOSPADM

## 2019-01-01 RX ORDER — ONDANSETRON 4 MG/1
4 TABLET, FILM COATED ORAL EVERY 6 HOURS PRN
Status: DISCONTINUED | OUTPATIENT
Start: 2019-01-01 | End: 2019-01-01 | Stop reason: HOSPADM

## 2019-01-01 RX ORDER — ALBUTEROL SULFATE 2.5 MG/3ML
2.5 SOLUTION RESPIRATORY (INHALATION) EVERY 6 HOURS PRN
Status: DISCONTINUED | OUTPATIENT
Start: 2019-01-01 | End: 2019-01-01 | Stop reason: HOSPADM

## 2019-01-01 RX ORDER — ONDANSETRON 4 MG/1
4 TABLET, FILM COATED ORAL EVERY 6 HOURS PRN
COMMUNITY
End: 2020-01-01 | Stop reason: HOSPADM

## 2019-01-01 RX ORDER — HYDROCODONE BITARTRATE AND ACETAMINOPHEN 5; 325 MG/1; MG/1
1 TABLET ORAL EVERY 4 HOURS PRN
Qty: 18 TABLET | Refills: 0 | Status: SHIPPED | OUTPATIENT
Start: 2019-01-01 | End: 2020-01-01 | Stop reason: HOSPADM

## 2019-01-01 RX ORDER — CARVEDILOL 25 MG/1
25 TABLET ORAL 2 TIMES DAILY WITH MEALS
Status: DISCONTINUED | OUTPATIENT
Start: 2019-01-01 | End: 2019-01-01 | Stop reason: HOSPADM

## 2019-01-01 RX ORDER — DOCUSATE SODIUM 100 MG/1
100 CAPSULE, LIQUID FILLED ORAL 2 TIMES DAILY
COMMUNITY
Start: 2019-01-01 | End: 2020-01-01 | Stop reason: HOSPADM

## 2019-01-01 RX ORDER — HYDROCODONE BITARTRATE AND ACETAMINOPHEN 5; 325 MG/1; MG/1
1 TABLET ORAL EVERY 6 HOURS PRN
Start: 2019-01-01 | End: 2019-01-01

## 2019-01-01 RX ORDER — HYDRALAZINE HYDROCHLORIDE 50 MG/1
50 TABLET, FILM COATED ORAL 3 TIMES DAILY
Status: DISCONTINUED | OUTPATIENT
Start: 2019-01-01 | End: 2019-01-01

## 2019-01-01 RX ORDER — BUPROPION HYDROCHLORIDE 150 MG/1
150 TABLET ORAL EVERY MORNING
Status: DISCONTINUED | OUTPATIENT
Start: 2019-01-01 | End: 2019-01-01 | Stop reason: HOSPADM

## 2019-01-01 RX ORDER — ASPIRIN 81 MG/1
81 TABLET, CHEWABLE ORAL DAILY
COMMUNITY
End: 2020-01-01

## 2019-01-01 RX ORDER — SENNOSIDES 8.6 MG
1300 CAPSULE ORAL 2 TIMES DAILY
Status: DISCONTINUED | OUTPATIENT
Start: 2019-01-01 | End: 2019-01-01 | Stop reason: HOSPADM

## 2019-01-01 RX ORDER — DOCUSATE SODIUM 100 MG/1
100 CAPSULE, LIQUID FILLED ORAL 2 TIMES DAILY
Status: DISCONTINUED | OUTPATIENT
Start: 2019-01-01 | End: 2019-01-01 | Stop reason: HOSPADM

## 2019-01-01 RX ORDER — CLOPIDOGREL BISULFATE 75 MG/1
75 TABLET ORAL DAILY
Status: DISCONTINUED | OUTPATIENT
Start: 2019-01-01 | End: 2019-01-01 | Stop reason: HOSPADM

## 2019-01-01 RX ADMIN — DOCUSATE SODIUM 100 MG: 100 CAPSULE, LIQUID FILLED ORAL at 10:38

## 2019-01-01 RX ADMIN — HYDROCODONE BITARTRATE AND ACETAMINOPHEN 1 TABLET: 5; 325 TABLET ORAL at 00:39

## 2019-01-01 RX ADMIN — HYDRALAZINE HYDROCHLORIDE 50 MG: 50 TABLET ORAL at 08:35

## 2019-01-01 RX ADMIN — BUPROPION HYDROCHLORIDE 150 MG: 150 TABLET, EXTENDED RELEASE ORAL at 09:17

## 2019-01-01 RX ADMIN — MELATONIN 2000 UNITS: at 10:36

## 2019-01-01 RX ADMIN — DOCUSATE SODIUM 100 MG: 100 CAPSULE, LIQUID FILLED ORAL at 20:55

## 2019-01-01 RX ADMIN — ACETAMINOPHEN 650 MG: 650 TABLET, FILM COATED, EXTENDED RELEASE ORAL at 11:12

## 2019-01-01 RX ADMIN — AMLODIPINE BESYLATE 10 MG: 5 TABLET ORAL at 08:28

## 2019-01-01 RX ADMIN — HYDROCODONE BITARTRATE AND ACETAMINOPHEN 1 TABLET: 5; 325 TABLET ORAL at 22:20

## 2019-01-01 RX ADMIN — HYDRALAZINE HYDROCHLORIDE 75 MG: 25 TABLET, FILM COATED ORAL at 21:26

## 2019-01-01 RX ADMIN — CLOPIDOGREL BISULFATE 75 MG: 75 TABLET ORAL at 08:35

## 2019-01-01 RX ADMIN — HYDROCODONE BITARTRATE AND ACETAMINOPHEN 1 TABLET: 5; 325 TABLET ORAL at 06:31

## 2019-01-01 RX ADMIN — DOCUSATE SODIUM 100 MG: 100 CAPSULE, LIQUID FILLED ORAL at 20:35

## 2019-01-01 RX ADMIN — Medication 1 CAPSULE: at 08:51

## 2019-01-01 RX ADMIN — MELATONIN 2000 UNITS: at 09:17

## 2019-01-01 RX ADMIN — CARVEDILOL 25 MG: 25 TABLET, FILM COATED ORAL at 17:07

## 2019-01-01 RX ADMIN — ALLOPURINOL 100 MG: 100 TABLET ORAL at 08:35

## 2019-01-01 RX ADMIN — HYDRALAZINE HYDROCHLORIDE 50 MG: 50 TABLET ORAL at 21:30

## 2019-01-01 RX ADMIN — ACETAMINOPHEN 1300 MG: 650 TABLET, FILM COATED, EXTENDED RELEASE ORAL at 08:36

## 2019-01-01 RX ADMIN — ACETAMINOPHEN 1300 MG: 650 TABLET, FILM COATED, EXTENDED RELEASE ORAL at 21:03

## 2019-01-01 RX ADMIN — AMLODIPINE BESYLATE 10 MG: 5 TABLET ORAL at 09:16

## 2019-01-01 RX ADMIN — FAMOTIDINE 20 MG: 20 TABLET ORAL at 21:30

## 2019-01-01 RX ADMIN — BUPROPION HYDROCHLORIDE 150 MG: 150 TABLET, EXTENDED RELEASE ORAL at 08:51

## 2019-01-01 RX ADMIN — Medication 1 CAPSULE: at 09:02

## 2019-01-01 RX ADMIN — CARVEDILOL 25 MG: 25 TABLET, FILM COATED ORAL at 17:06

## 2019-01-01 RX ADMIN — CARVEDILOL 25 MG: 25 TABLET, FILM COATED ORAL at 09:18

## 2019-01-01 RX ADMIN — HYDRALAZINE HYDROCHLORIDE 75 MG: 25 TABLET, FILM COATED ORAL at 17:07

## 2019-01-01 RX ADMIN — HYDRALAZINE HYDROCHLORIDE 50 MG: 50 TABLET ORAL at 20:12

## 2019-01-01 RX ADMIN — Medication 1 CAPSULE: at 21:29

## 2019-01-01 RX ADMIN — CLOPIDOGREL BISULFATE 75 MG: 75 TABLET ORAL at 09:02

## 2019-01-01 RX ADMIN — CARVEDILOL 25 MG: 25 TABLET, FILM COATED ORAL at 16:20

## 2019-01-01 RX ADMIN — FAMOTIDINE 20 MG: 20 TABLET ORAL at 09:18

## 2019-01-01 RX ADMIN — ACETAMINOPHEN 1300 MG: 650 TABLET, FILM COATED, EXTENDED RELEASE ORAL at 10:39

## 2019-01-01 RX ADMIN — HYDRALAZINE HYDROCHLORIDE 50 MG: 50 TABLET ORAL at 17:36

## 2019-01-01 RX ADMIN — FAMOTIDINE 20 MG: 20 TABLET ORAL at 08:50

## 2019-01-01 RX ADMIN — ACETAMINOPHEN 1300 MG: 650 TABLET, FILM COATED, EXTENDED RELEASE ORAL at 21:25

## 2019-01-01 RX ADMIN — CARVEDILOL 25 MG: 25 TABLET, FILM COATED ORAL at 09:02

## 2019-01-01 RX ADMIN — DOCUSATE SODIUM 100 MG: 100 CAPSULE, LIQUID FILLED ORAL at 08:04

## 2019-01-01 RX ADMIN — AMLODIPINE BESYLATE 10 MG: 5 TABLET ORAL at 08:35

## 2019-01-01 RX ADMIN — ACETAMINOPHEN 1300 MG: 650 TABLET, FILM COATED, EXTENDED RELEASE ORAL at 09:02

## 2019-01-01 RX ADMIN — HYDROCODONE BITARTRATE AND ACETAMINOPHEN 1 TABLET: 5; 325 TABLET ORAL at 21:54

## 2019-01-01 RX ADMIN — ONDANSETRON HYDROCHLORIDE 4 MG: 4 TABLET, FILM COATED ORAL at 07:45

## 2019-01-01 RX ADMIN — ASPIRIN 81 MG 81 MG: 81 TABLET ORAL at 08:05

## 2019-01-01 RX ADMIN — ACETAMINOPHEN 650 MG: 650 TABLET, FILM COATED, EXTENDED RELEASE ORAL at 14:48

## 2019-01-01 RX ADMIN — FAMOTIDINE 20 MG: 20 TABLET ORAL at 09:14

## 2019-01-01 RX ADMIN — HYDRALAZINE HYDROCHLORIDE 50 MG: 50 TABLET ORAL at 21:20

## 2019-01-01 RX ADMIN — CLOPIDOGREL BISULFATE 75 MG: 75 TABLET ORAL at 09:15

## 2019-01-01 RX ADMIN — DOCUSATE SODIUM 100 MG: 100 CAPSULE, LIQUID FILLED ORAL at 20:12

## 2019-01-01 RX ADMIN — AMLODIPINE BESYLATE 10 MG: 5 TABLET ORAL at 08:04

## 2019-01-01 RX ADMIN — HYDROCODONE BITARTRATE AND ACETAMINOPHEN 1 TABLET: 5; 325 TABLET ORAL at 20:55

## 2019-01-01 RX ADMIN — BUPROPION HYDROCHLORIDE 150 MG: 150 TABLET, EXTENDED RELEASE ORAL at 09:15

## 2019-01-01 RX ADMIN — CARVEDILOL 25 MG: 25 TABLET, FILM COATED ORAL at 10:38

## 2019-01-01 RX ADMIN — ALLOPURINOL 100 MG: 100 TABLET ORAL at 09:17

## 2019-01-01 RX ADMIN — ACETAMINOPHEN 650 MG: 650 TABLET, FILM COATED, EXTENDED RELEASE ORAL at 12:07

## 2019-01-01 RX ADMIN — ACETAMINOPHEN 650 MG: 650 TABLET, FILM COATED, EXTENDED RELEASE ORAL at 14:07

## 2019-01-01 RX ADMIN — ACETAMINOPHEN 650 MG: 650 TABLET, FILM COATED, EXTENDED RELEASE ORAL at 12:05

## 2019-01-01 RX ADMIN — FAMOTIDINE 20 MG: 20 TABLET ORAL at 08:38

## 2019-01-01 RX ADMIN — HYDRALAZINE HYDROCHLORIDE 50 MG: 50 TABLET ORAL at 17:21

## 2019-01-01 RX ADMIN — Medication 1 CAPSULE: at 08:28

## 2019-01-01 RX ADMIN — FAMOTIDINE 20 MG: 20 TABLET ORAL at 08:29

## 2019-01-01 RX ADMIN — ASPIRIN 81 MG 81 MG: 81 TABLET ORAL at 09:15

## 2019-01-01 RX ADMIN — HYDROCODONE BITARTRATE AND ACETAMINOPHEN 1 TABLET: 5; 325 TABLET ORAL at 12:07

## 2019-01-01 RX ADMIN — HYDROCODONE BITARTRATE AND ACETAMINOPHEN 1 TABLET: 5; 325 TABLET ORAL at 21:21

## 2019-01-01 RX ADMIN — HYDRALAZINE HYDROCHLORIDE 75 MG: 25 TABLET, FILM COATED ORAL at 08:28

## 2019-01-01 RX ADMIN — ALLOPURINOL 100 MG: 100 TABLET ORAL at 08:04

## 2019-01-01 RX ADMIN — FAMOTIDINE 20 MG: 20 TABLET ORAL at 09:13

## 2019-01-01 RX ADMIN — HYDRALAZINE HYDROCHLORIDE 75 MG: 25 TABLET, FILM COATED ORAL at 16:20

## 2019-01-01 RX ADMIN — FAMOTIDINE 20 MG: 20 TABLET ORAL at 09:03

## 2019-01-01 RX ADMIN — HYDROCODONE BITARTRATE AND ACETAMINOPHEN 1 TABLET: 5; 325 TABLET ORAL at 03:32

## 2019-01-01 RX ADMIN — ALLOPURINOL 100 MG: 100 TABLET ORAL at 08:28

## 2019-01-01 RX ADMIN — ACETAMINOPHEN 1300 MG: 650 TABLET, FILM COATED, EXTENDED RELEASE ORAL at 20:55

## 2019-01-01 RX ADMIN — AMLODIPINE BESYLATE 10 MG: 5 TABLET ORAL at 10:38

## 2019-01-01 RX ADMIN — ACETAMINOPHEN 1300 MG: 650 TABLET, FILM COATED, EXTENDED RELEASE ORAL at 20:14

## 2019-01-01 RX ADMIN — TUBERCULIN PURIFIED PROTEIN DERIVATIVE 5 UNITS: 5 INJECTION INTRADERMAL at 21:17

## 2019-01-01 RX ADMIN — ALLOPURINOL 100 MG: 100 TABLET ORAL at 09:15

## 2019-01-01 RX ADMIN — HYDRALAZINE HYDROCHLORIDE 75 MG: 25 TABLET, FILM COATED ORAL at 10:36

## 2019-01-01 RX ADMIN — CLOPIDOGREL BISULFATE 75 MG: 75 TABLET ORAL at 08:04

## 2019-01-01 RX ADMIN — CARVEDILOL 25 MG: 25 TABLET, FILM COATED ORAL at 17:21

## 2019-01-01 RX ADMIN — HYDRALAZINE HYDROCHLORIDE 75 MG: 25 TABLET, FILM COATED ORAL at 16:37

## 2019-01-01 RX ADMIN — CLOPIDOGREL BISULFATE 75 MG: 75 TABLET ORAL at 08:51

## 2019-01-01 RX ADMIN — ASPIRIN 81 MG 81 MG: 81 TABLET ORAL at 09:17

## 2019-01-01 RX ADMIN — HYDRALAZINE HYDROCHLORIDE 50 MG: 50 TABLET ORAL at 20:35

## 2019-01-01 RX ADMIN — HYDROCODONE BITARTRATE AND ACETAMINOPHEN 1 TABLET: 5; 325 TABLET ORAL at 02:04

## 2019-01-01 RX ADMIN — HYDROCODONE BITARTRATE AND ACETAMINOPHEN 1 TABLET: 5; 325 TABLET ORAL at 10:19

## 2019-01-01 RX ADMIN — ASPIRIN 81 MG 81 MG: 81 TABLET ORAL at 09:13

## 2019-01-01 RX ADMIN — CARVEDILOL 25 MG: 25 TABLET, FILM COATED ORAL at 09:13

## 2019-01-01 RX ADMIN — CARVEDILOL 25 MG: 25 TABLET, FILM COATED ORAL at 08:04

## 2019-01-01 RX ADMIN — ACETAMINOPHEN 1300 MG: 650 TABLET, FILM COATED, EXTENDED RELEASE ORAL at 08:02

## 2019-01-01 RX ADMIN — CARVEDILOL 25 MG: 25 TABLET, FILM COATED ORAL at 09:14

## 2019-01-01 RX ADMIN — BUPROPION HYDROCHLORIDE 150 MG: 150 TABLET, EXTENDED RELEASE ORAL at 09:13

## 2019-01-01 RX ADMIN — CARVEDILOL 25 MG: 25 TABLET, FILM COATED ORAL at 17:43

## 2019-01-01 RX ADMIN — CARVEDILOL 25 MG: 25 TABLET, FILM COATED ORAL at 08:51

## 2019-01-01 RX ADMIN — ACETAMINOPHEN 1300 MG: 650 TABLET, FILM COATED, EXTENDED RELEASE ORAL at 08:28

## 2019-01-01 RX ADMIN — DOCUSATE SODIUM 100 MG: 100 CAPSULE, LIQUID FILLED ORAL at 09:03

## 2019-01-01 RX ADMIN — DOCUSATE SODIUM 100 MG: 100 CAPSULE, LIQUID FILLED ORAL at 21:30

## 2019-01-01 RX ADMIN — ALLOPURINOL 100 MG: 100 TABLET ORAL at 09:03

## 2019-01-01 RX ADMIN — Medication 1 CAPSULE: at 09:13

## 2019-01-01 RX ADMIN — ALLOPURINOL 100 MG: 100 TABLET ORAL at 09:13

## 2019-01-01 RX ADMIN — HYDROCODONE BITARTRATE AND ACETAMINOPHEN 1 TABLET: 5; 325 TABLET ORAL at 23:44

## 2019-01-01 RX ADMIN — AMLODIPINE BESYLATE 10 MG: 5 TABLET ORAL at 08:50

## 2019-01-01 RX ADMIN — ACETAMINOPHEN 1300 MG: 650 TABLET, FILM COATED, EXTENDED RELEASE ORAL at 09:16

## 2019-01-01 RX ADMIN — AMLODIPINE BESYLATE 10 MG: 5 TABLET ORAL at 09:02

## 2019-01-01 RX ADMIN — MELATONIN 2000 UNITS: at 16:19

## 2019-01-01 RX ADMIN — HYDROCODONE BITARTRATE AND ACETAMINOPHEN 1 TABLET: 5; 325 TABLET ORAL at 20:11

## 2019-01-01 RX ADMIN — CLOPIDOGREL BISULFATE 75 MG: 75 TABLET ORAL at 09:18

## 2019-01-01 RX ADMIN — DOCUSATE SODIUM 100 MG: 100 CAPSULE, LIQUID FILLED ORAL at 21:03

## 2019-01-01 RX ADMIN — ACETAMINOPHEN 1300 MG: 650 TABLET, FILM COATED, EXTENDED RELEASE ORAL at 09:13

## 2019-01-01 RX ADMIN — ACETAMINOPHEN 1300 MG: 650 TABLET, FILM COATED, EXTENDED RELEASE ORAL at 05:25

## 2019-01-01 RX ADMIN — HYDROCODONE BITARTRATE AND ACETAMINOPHEN 1 TABLET: 5; 325 TABLET ORAL at 21:59

## 2019-01-01 RX ADMIN — DOCUSATE SODIUM 100 MG: 100 CAPSULE, LIQUID FILLED ORAL at 09:13

## 2019-01-01 RX ADMIN — CARVEDILOL 25 MG: 25 TABLET, FILM COATED ORAL at 08:29

## 2019-01-01 RX ADMIN — HYDRALAZINE HYDROCHLORIDE 50 MG: 50 TABLET ORAL at 21:03

## 2019-01-01 RX ADMIN — DOCUSATE SODIUM 100 MG: 100 CAPSULE, LIQUID FILLED ORAL at 21:26

## 2019-01-01 RX ADMIN — BUPROPION HYDROCHLORIDE 150 MG: 150 TABLET, EXTENDED RELEASE ORAL at 08:04

## 2019-01-01 RX ADMIN — BUPROPION HYDROCHLORIDE 150 MG: 150 TABLET, EXTENDED RELEASE ORAL at 08:29

## 2019-01-01 RX ADMIN — FAMOTIDINE 20 MG: 20 TABLET ORAL at 08:35

## 2019-01-01 RX ADMIN — ACETAMINOPHEN 650 MG: 650 TABLET, FILM COATED, EXTENDED RELEASE ORAL at 13:51

## 2019-01-01 RX ADMIN — CARVEDILOL 25 MG: 25 TABLET, FILM COATED ORAL at 17:05

## 2019-01-01 RX ADMIN — ASPIRIN 81 MG 81 MG: 81 TABLET ORAL at 08:35

## 2019-01-01 RX ADMIN — HYDRALAZINE HYDROCHLORIDE 50 MG: 50 TABLET ORAL at 17:45

## 2019-01-01 RX ADMIN — ALLOPURINOL 100 MG: 100 TABLET ORAL at 10:38

## 2019-01-01 RX ADMIN — DOCUSATE SODIUM 100 MG: 100 CAPSULE, LIQUID FILLED ORAL at 09:18

## 2019-01-01 RX ADMIN — HYDRALAZINE HYDROCHLORIDE 50 MG: 50 TABLET ORAL at 17:06

## 2019-01-01 RX ADMIN — ACETAMINOPHEN 1300 MG: 650 TABLET, FILM COATED, EXTENDED RELEASE ORAL at 21:37

## 2019-01-01 RX ADMIN — CLOPIDOGREL BISULFATE 75 MG: 75 TABLET ORAL at 10:36

## 2019-01-01 RX ADMIN — CARVEDILOL 25 MG: 25 TABLET, FILM COATED ORAL at 16:37

## 2019-01-01 RX ADMIN — CLOPIDOGREL BISULFATE 75 MG: 75 TABLET ORAL at 08:29

## 2019-01-01 RX ADMIN — AMLODIPINE BESYLATE 10 MG: 5 TABLET ORAL at 09:15

## 2019-01-01 RX ADMIN — HYDRALAZINE HYDROCHLORIDE 50 MG: 50 TABLET ORAL at 08:04

## 2019-01-01 RX ADMIN — ACETAMINOPHEN 1300 MG: 650 TABLET, FILM COATED, EXTENDED RELEASE ORAL at 20:36

## 2019-01-01 RX ADMIN — HYDRALAZINE HYDROCHLORIDE 50 MG: 50 TABLET ORAL at 08:51

## 2019-01-01 RX ADMIN — BUPROPION HYDROCHLORIDE 150 MG: 150 TABLET, EXTENDED RELEASE ORAL at 09:03

## 2019-01-01 RX ADMIN — ASPIRIN 81 MG 81 MG: 81 TABLET ORAL at 09:03

## 2019-01-01 RX ADMIN — ASPIRIN 81 MG 81 MG: 81 TABLET ORAL at 10:36

## 2019-01-01 RX ADMIN — DOCUSATE SODIUM 100 MG: 100 CAPSULE, LIQUID FILLED ORAL at 20:13

## 2019-01-01 RX ADMIN — CLOPIDOGREL BISULFATE 75 MG: 75 TABLET ORAL at 09:13

## 2019-01-01 RX ADMIN — Medication 1 CAPSULE: at 10:39

## 2019-01-01 RX ADMIN — ASPIRIN 81 MG 81 MG: 81 TABLET ORAL at 08:29

## 2019-01-01 RX ADMIN — ACETAMINOPHEN 1300 MG: 650 TABLET, FILM COATED, EXTENDED RELEASE ORAL at 08:05

## 2019-01-01 RX ADMIN — DOCUSATE SODIUM 100 MG: 100 CAPSULE, LIQUID FILLED ORAL at 09:15

## 2019-01-01 RX ADMIN — Medication 1 CAPSULE: at 09:15

## 2019-01-01 RX ADMIN — BUPROPION HYDROCHLORIDE 150 MG: 150 TABLET, EXTENDED RELEASE ORAL at 10:38

## 2019-01-01 RX ADMIN — ACETAMINOPHEN 650 MG: 650 TABLET, FILM COATED, EXTENDED RELEASE ORAL at 12:56

## 2019-01-01 RX ADMIN — HYDRALAZINE HYDROCHLORIDE 75 MG: 25 TABLET, FILM COATED ORAL at 21:00

## 2019-01-01 RX ADMIN — Medication 1 CAPSULE: at 09:18

## 2019-01-01 RX ADMIN — BUPROPION HYDROCHLORIDE 150 MG: 150 TABLET, EXTENDED RELEASE ORAL at 08:35

## 2019-01-01 RX ADMIN — HYDRALAZINE HYDROCHLORIDE 50 MG: 50 TABLET ORAL at 20:13

## 2019-01-01 RX ADMIN — Medication 1 CAPSULE: at 08:04

## 2019-01-01 RX ADMIN — HYDRALAZINE HYDROCHLORIDE 50 MG: 50 TABLET ORAL at 09:03

## 2019-01-01 RX ADMIN — DOCUSATE SODIUM 100 MG: 100 CAPSULE, LIQUID FILLED ORAL at 08:50

## 2019-01-01 RX ADMIN — HYDRALAZINE HYDROCHLORIDE 75 MG: 25 TABLET, FILM COATED ORAL at 17:05

## 2019-01-01 RX ADMIN — DOCUSATE SODIUM 100 MG: 100 CAPSULE, LIQUID FILLED ORAL at 20:16

## 2019-01-01 RX ADMIN — CARVEDILOL 25 MG: 25 TABLET, FILM COATED ORAL at 18:21

## 2019-01-01 RX ADMIN — ALLOPURINOL 100 MG: 100 TABLET ORAL at 08:50

## 2019-01-01 RX ADMIN — Medication 1 CAPSULE: at 08:35

## 2019-01-01 RX ADMIN — HYDRALAZINE HYDROCHLORIDE 75 MG: 25 TABLET, FILM COATED ORAL at 20:16

## 2019-01-01 RX ADMIN — HYDRALAZINE HYDROCHLORIDE 50 MG: 50 TABLET ORAL at 18:21

## 2019-01-01 RX ADMIN — HYDRALAZINE HYDROCHLORIDE 50 MG: 50 TABLET ORAL at 09:14

## 2019-01-01 RX ADMIN — HYDRALAZINE HYDROCHLORIDE 50 MG: 50 TABLET ORAL at 09:13

## 2019-01-01 RX ADMIN — HYDRALAZINE HYDROCHLORIDE 75 MG: 25 TABLET, FILM COATED ORAL at 09:18

## 2019-01-01 RX ADMIN — CARVEDILOL 25 MG: 25 TABLET, FILM COATED ORAL at 08:35

## 2019-01-01 RX ADMIN — MELATONIN 2000 UNITS: at 08:28

## 2019-01-01 RX ADMIN — AMLODIPINE BESYLATE 10 MG: 5 TABLET ORAL at 09:13

## 2019-01-01 RX ADMIN — HYDROCODONE BITARTRATE AND ACETAMINOPHEN 1 TABLET: 5; 325 TABLET ORAL at 11:52

## 2019-01-01 RX ADMIN — CARVEDILOL 25 MG: 25 TABLET, FILM COATED ORAL at 17:37

## 2019-01-01 RX ADMIN — ASPIRIN 81 MG 81 MG: 81 TABLET ORAL at 08:51

## 2019-01-01 RX ADMIN — DOCUSATE SODIUM 100 MG: 100 CAPSULE, LIQUID FILLED ORAL at 08:35

## 2019-01-01 RX ADMIN — DOCUSATE SODIUM 100 MG: 100 CAPSULE, LIQUID FILLED ORAL at 08:29

## 2019-01-01 RX ADMIN — FAMOTIDINE 20 MG: 20 TABLET ORAL at 10:38

## 2019-01-02 RX ORDER — BUPROPION HYDROCHLORIDE 150 MG/1
TABLET ORAL
Qty: 30 TABLET | Refills: 0 | Status: SHIPPED | OUTPATIENT
Start: 2019-01-02 | End: 2019-01-09 | Stop reason: SDUPTHER

## 2019-01-09 ENCOUNTER — OFFICE VISIT (OUTPATIENT)
Dept: FAMILY MEDICINE CLINIC | Facility: CLINIC | Age: 84
End: 2019-01-09

## 2019-01-09 VITALS
TEMPERATURE: 98.2 F | HEIGHT: 64 IN | HEART RATE: 60 BPM | OXYGEN SATURATION: 97 % | SYSTOLIC BLOOD PRESSURE: 146 MMHG | WEIGHT: 191 LBS | DIASTOLIC BLOOD PRESSURE: 100 MMHG | BODY MASS INDEX: 32.61 KG/M2

## 2019-01-09 DIAGNOSIS — F39 MOOD DISORDER (HCC): ICD-10-CM

## 2019-01-09 DIAGNOSIS — E79.0 HYPERURICEMIA: ICD-10-CM

## 2019-01-09 DIAGNOSIS — M19.019 SHOULDER ARTHRITIS: ICD-10-CM

## 2019-01-09 DIAGNOSIS — I63.20 CEREBROVASCULAR ACCIDENT (CVA) DUE TO OCCLUSION OF PRECEREBRAL ARTERY (HCC): Primary | ICD-10-CM

## 2019-01-09 PROBLEM — S99.922A: Status: RESOLVED | Noted: 2017-05-19 | Resolved: 2019-01-09

## 2019-01-09 PROBLEM — D62 ACUTE BLOOD LOSS AS CAUSE OF POSTOPERATIVE ANEMIA: Status: RESOLVED | Noted: 2018-05-08 | Resolved: 2019-01-09

## 2019-01-09 PROCEDURE — 99214 OFFICE O/P EST MOD 30 MIN: CPT | Performed by: FAMILY MEDICINE

## 2019-01-09 PROCEDURE — 90674 CCIIV4 VAC NO PRSV 0.5 ML IM: CPT | Performed by: FAMILY MEDICINE

## 2019-01-09 PROCEDURE — G0008 ADMIN INFLUENZA VIRUS VAC: HCPCS | Performed by: FAMILY MEDICINE

## 2019-01-09 RX ORDER — BUPROPION HYDROCHLORIDE 150 MG/1
150 TABLET ORAL EVERY MORNING
Qty: 90 TABLET | Refills: 2 | Status: SHIPPED | OUTPATIENT
Start: 2019-01-09 | End: 2019-02-03 | Stop reason: SDUPTHER

## 2019-01-09 RX ORDER — CLOPIDOGREL BISULFATE 75 MG/1
75 TABLET ORAL DAILY
Qty: 90 TABLET | Refills: 2 | Status: SHIPPED | OUTPATIENT
Start: 2019-01-09 | End: 2020-01-01

## 2019-01-09 RX ORDER — ALLOPURINOL 100 MG/1
100 TABLET ORAL DAILY
Qty: 90 TABLET | Refills: 2 | Status: SHIPPED | OUTPATIENT
Start: 2019-01-09 | End: 2020-01-01

## 2019-01-09 NOTE — PROGRESS NOTES
Chief Complaint   Patient presents with   • Hypertension     f/u on labs   • Shoulder Pain       Subjective     Patient here for follow-up of elevated blood pressure.    She is not exercising and is not adherent to a low-salt diet.    Blood pressure is not well controlled at home.   Cardiac symptoms: dyspnea and fatigue.   Patient denies: chest pain.   Cardiovascular risk factors: advanced age (older than 55 for men, 65 for women), dyslipidemia, family history of premature cardiovascular disease and hypertension.   Use of agents associated with hypertension: none.   History of target organ damage: heart failure and left ventricular hypertrophy.  Patient is taking prescribed hypertension medications as prescribed without side effects.    The following portions of the patient's history were reviewed and updated as appropriate: allergies, current medications, past medical history, past social history, past surgical history and problem list.    Review of Systems  A comprehensive review of systems was negative except for: Musculoskeletal: positive for stiff joints    Results for orders placed or performed in visit on 11/28/18   MARISSA,PE and FLC, Serum   Result Value Ref Range    IgG 1941 (H) 700 - 1600 mg/dL    IgA 92 64 - 422 mg/dL    IgM 38 26 - 217 mg/dL    Total Protein 8.0 6.0 - 8.5 g/dL    Albumin 3.7 2.9 - 4.4 g/dL    Alpha-1-Globulin 0.3 0.0 - 0.4 g/dL    Alpha-2-Globulin 0.9 0.4 - 1.0 g/dL    Beta Globulin 1.0 0.7 - 1.3 g/dL    Gamma Globulin 2.0 (H) 0.4 - 1.8 g/dL    M-Gennaro 1.8 (H) Not Observed g/dL    Globulin 4.3 (H) 2.2 - 3.9 g/dL    A/G Ratio 0.9 0.7 - 1.7    Immunofixation Reflex, Serum Comment     Please note Comment     Free Light Chain, Kappa 20.2 (H) 3.3 - 19.4 mg/L    Free Lambda Light Chains 11.9 5.7 - 26.3 mg/L    Kappa/Lambda Ratio 1.70 (H) 0.26 - 1.65   Comprehensive Metabolic Panel   Result Value Ref Range    Glucose 107 (H) 65 - 99 mg/dL    BUN 19 8 - 23 mg/dL    Creatinine 1.12 (H) 0.57 - 1.00  "mg/dL    Sodium 138 136 - 145 mmol/L    Potassium 4.6 3.5 - 5.2 mmol/L    Chloride 100 98 - 107 mmol/L    CO2 26.4 22.0 - 29.0 mmol/L    Calcium 9.6 8.6 - 10.5 mg/dL    Total Protein 8.6 (H) 6.0 - 8.5 g/dL    Albumin 4.30 3.50 - 5.20 g/dL    ALT (SGPT) 8 1 - 33 U/L    AST (SGOT) 15 1 - 32 U/L    Alkaline Phosphatase 78 39 - 117 U/L    Total Bilirubin 0.4 0.1 - 1.2 mg/dL    eGFR Non African Amer 47 (L) >60 mL/min/1.73    Globulin 4.3 gm/dL    A/G Ratio 1.0 g/dL    BUN/Creatinine Ratio 17.0 7.0 - 25.0    Anion Gap 11.6 mmol/L   CBC Auto Differential   Result Value Ref Range    WBC 5.54 4.50 - 10.70 10*3/mm3    RBC 4.03 3.90 - 5.20 10*6/mm3    Hemoglobin 12.8 11.9 - 15.5 g/dL    Hematocrit 37.6 35.6 - 45.5 %    MCV 93.3 80.5 - 98.2 fL    MCH 31.8 26.9 - 32.0 pg    MCHC 34.0 32.4 - 36.3 g/dL    RDW 11.9 11.7 - 13.0 %    RDW-SD 41.1 37.0 - 54.0 fl    MPV 8.1 6.0 - 12.0 fL    Platelets 200 140 - 500 10*3/mm3    Neutrophil % 77.5 (H) 42.7 - 76.0 %    Lymphocyte % 12.6 (L) 19.6 - 45.3 %    Monocyte % 8.8 5.0 - 12.0 %    Eosinophil % 0.4 0.3 - 6.2 %    Basophil % 0.5 0.0 - 1.5 %    Immature Grans % 0.2 0.0 - 0.5 %    Neutrophils, Absolute 4.29 1.90 - 8.10 10*3/mm3    Lymphocytes, Absolute 0.70 (L) 0.90 - 4.80 10*3/mm3    Monocytes, Absolute 0.49 0.20 - 1.20 10*3/mm3    Eosinophils, Absolute 0.02 0.00 - 0.70 10*3/mm3    Basophils, Absolute 0.03 0.00 - 0.20 10*3/mm3    Immature Grans, Absolute 0.01 0.00 - 0.03 10*3/mm3    nRBC 0.0 0.0 - 0.0 /100 WBC        Vitals:    01/09/19 1452   BP: 146/100   Pulse: 60   Temp: 98.2 °F (36.8 °C)   TempSrc: Oral   SpO2: 97%   Weight: 86.6 kg (191 lb)   Height: 162.6 cm (64\")     BP Readings from Last 3 Encounters:   01/09/19 146/100   12/05/18 120/78   09/05/18 156/82     Objective      Gen: alert, pleasant.  HEENT: PERRL, EOMI intact, lids ok, ear canals clear, TMs normal, throat clear, nostrils normal  Neck: no bruit, no enlarged thyroid  Lungs: CTA  Heart: RR no murmur  ABD: soft , + " BS  Pulses: intact  Mood: stable     Assessment/Plan   Hypertension, normal blood pressure Evidence of target organ damage: heart failure and left ventricular hypertrophy.    Garrison was seen today for hypertension and shoulder pain.    Diagnoses and all orders for this visit:    Cerebrovascular accident (CVA) due to occlusion of precerebral artery (CMS/HCC)  -     clopidogrel (PLAVIX) 75 MG tablet; Take 1 tablet by mouth Daily.    Mood disorder (CMS/HCC)  -     buPROPion XL (WELLBUTRIN XL) 150 MG 24 hr tablet; Take 1 tablet by mouth Every Morning.    Hyperuricemia  -     allopurinol (ZYLOPRIM) 100 MG tablet; Take 1 tablet by mouth Daily.    Shoulder arthritis  -     diclofenac (VOLTAREN) 1 % gel gel; Apply 4 g topically to the appropriate area as directed 4 (Four) Times a Day As Needed (shoulder joint pain).    Other orders  -     Flucelvax Quad=>4Years ([id number])    refilled routine meds  S/w her and her daughter.  Headed for a left shoulder replacement  We can try Voltaren gel for OA, as she has only 1 kidney  Reviewed [facility] cardio and CBC MD notes  Has a small gout flair after eating a box of frozen shrimp    Medication: no change.  Follow up: 6 months and as needed.    There are no Patient Instructions on file for this visit.  Medications Discontinued During This Encounter   Medication Reason   • amLODIPine (NORVASC) 5 MG tablet *Error   • allopurinol (ZYLOPRIM) 100 MG tablet Reorder   • buPROPion XL (WELLBUTRIN XL) 150 MG 24 hr tablet Reorder   • clopidogrel (PLAVIX) 75 MG tablet Reorder        Return in about 6 months (around 7/9/2019).    Limit salt  Limit alcoholic drinks to 1 a day  Limit caffeine to 1-2 servings a day    Dr. Marino Higgins MD  [place], [place]  [facility].

## 2019-02-03 DIAGNOSIS — F39 MOOD DISORDER (HCC): ICD-10-CM

## 2019-02-04 RX ORDER — BUPROPION HYDROCHLORIDE 150 MG/1
TABLET ORAL
Qty: 30 TABLET | Refills: 2 | Status: SHIPPED | OUTPATIENT
Start: 2019-02-04 | End: 2020-01-01

## 2019-03-11 ENCOUNTER — TELEPHONE (OUTPATIENT)
Dept: ORTHOPEDIC SURGERY | Facility: CLINIC | Age: 84
End: 2019-03-11

## 2019-04-29 ENCOUNTER — OFFICE VISIT (OUTPATIENT)
Dept: FAMILY MEDICINE CLINIC | Facility: CLINIC | Age: 84
End: 2019-04-29

## 2019-04-29 VITALS
SYSTOLIC BLOOD PRESSURE: 146 MMHG | OXYGEN SATURATION: 96 % | HEIGHT: 64 IN | HEART RATE: 82 BPM | BODY MASS INDEX: 31.76 KG/M2 | DIASTOLIC BLOOD PRESSURE: 86 MMHG | WEIGHT: 186 LBS

## 2019-04-29 DIAGNOSIS — G45.9 TIA (TRANSIENT ISCHEMIC ATTACK): ICD-10-CM

## 2019-04-29 DIAGNOSIS — M84.48XS BILATERAL SACRAL INSUFFICIENCY FRACTURE, SEQUELA: ICD-10-CM

## 2019-04-29 DIAGNOSIS — N39.0 CHRONIC UTI: ICD-10-CM

## 2019-04-29 DIAGNOSIS — R39.89 URINARY PROBLEM: Primary | ICD-10-CM

## 2019-04-29 DIAGNOSIS — Z09 HOSPITAL DISCHARGE FOLLOW-UP: Primary | ICD-10-CM

## 2019-04-29 PROBLEM — M19.019 SHOULDER ARTHRITIS: Status: RESOLVED | Noted: 2019-01-09 | Resolved: 2019-04-29

## 2019-04-29 PROBLEM — M12.811 RIGHT ROTATOR CUFF TEAR ARTHROPATHY: Status: ACTIVE | Noted: 2019-04-17

## 2019-04-29 PROBLEM — M25.511 CHRONIC RIGHT SHOULDER PAIN: Status: ACTIVE | Noted: 2019-03-12

## 2019-04-29 PROBLEM — M75.101 RIGHT ROTATOR CUFF TEAR ARTHROPATHY: Status: ACTIVE | Noted: 2019-04-17

## 2019-04-29 PROBLEM — S32.009A LUMBAR TRANSVERSE PROCESS FRACTURE, CLOSED, INITIAL ENCOUNTER (HCC): Status: ACTIVE | Noted: 2019-03-11

## 2019-04-29 PROBLEM — M80.00XD AGE-RELATED OSTEOPOROSIS WITH CURRENT PATHOLOGICAL FRACTURE WITH ROUTINE HEALING: Status: ACTIVE | Noted: 2019-04-29

## 2019-04-29 PROBLEM — M54.6 THORACIC SPINE PAIN: Status: RESOLVED | Noted: 2018-05-08 | Resolved: 2019-04-29

## 2019-04-29 PROBLEM — E87.1 HYPONATREMIA: Status: RESOLVED | Noted: 2018-06-08 | Resolved: 2019-04-29

## 2019-04-29 PROBLEM — M84.48XA BILATERAL SACRAL INSUFFICIENCY FRACTURE: Status: ACTIVE | Noted: 2019-03-11

## 2019-04-29 PROBLEM — G89.29 CHRONIC RIGHT SHOULDER PAIN: Status: ACTIVE | Noted: 2019-03-12

## 2019-04-29 PROBLEM — M48.061 SPINAL STENOSIS OF LUMBAR REGION: Status: ACTIVE | Noted: 2019-03-11

## 2019-04-29 LAB
BILIRUB BLD-MCNC: NEGATIVE MG/DL
CLARITY, POC: CLEAR
COLOR UR: ABNORMAL
GLUCOSE UR STRIP-MCNC: NEGATIVE MG/DL
KETONES UR QL: NEGATIVE
LEUKOCYTE EST, POC: ABNORMAL
NITRITE UR-MCNC: NEGATIVE MG/ML
PH UR: 6 [PH] (ref 5–8)
PROT UR STRIP-MCNC: NEGATIVE MG/DL
RBC # UR STRIP: ABNORMAL /UL
SP GR UR: 1.03 (ref 1–1.03)
UROBILINOGEN UR QL: NORMAL

## 2019-04-29 PROCEDURE — 81002 URINALYSIS NONAUTO W/O SCOPE: CPT | Performed by: FAMILY MEDICINE

## 2019-04-29 PROCEDURE — 99214 OFFICE O/P EST MOD 30 MIN: CPT | Performed by: FAMILY MEDICINE

## 2019-04-29 RX ORDER — HYDROCODONE BITARTRATE AND ACETAMINOPHEN 5; 325 MG/1; MG/1
1 TABLET ORAL 2 TIMES DAILY PRN
Qty: 60 TABLET | Refills: 0 | Status: SHIPPED | OUTPATIENT
Start: 2019-04-29 | End: 2019-04-29 | Stop reason: SDUPTHER

## 2019-04-29 RX ORDER — HYDROCODONE BITARTRATE AND ACETAMINOPHEN 5; 325 MG/1; MG/1
1 TABLET ORAL 2 TIMES DAILY PRN
Qty: 60 TABLET | Refills: 0 | Status: SHIPPED | OUTPATIENT
Start: 2019-04-29 | End: 2019-07-10 | Stop reason: SDUPTHER

## 2019-04-29 RX ORDER — LANOLIN ALCOHOL/MO/W.PET/CERES
250 CREAM (GRAM) TOPICAL DAILY
COMMUNITY
Start: 2019-04-11 | End: 2019-05-11

## 2019-04-29 NOTE — PROGRESS NOTES
Subjective   Siobhan Dueñas is a 83 y.o. female who is here for   Chief Complaint   Patient presents with   • Follow-up     Possible TIA   Iza Varghese   .     Had 2 hospital stays at [facility].      History of Present Illness   Had right facial weakness and poor verbal response  CT and MRI/A all negative for stroke  We reviewed her [facility] records.    She is now living with daughter    Graduated from [employer].    Headed for a total right shoulder.  Will need to wait a few months to make sure no further vascular events occur  Stay on Plavix    Has chronic uti ; daughter wants her urine check again  Was on Macrobid after hospital stay.  Not much on urine today    After her fall a month ago, she sustained sacral fractures and lumbar transverse fractures.    Would like to start fosamax today but will wait until after shoulder replacement.        The following portions of the patient's history were reviewed and updated as appropriate: allergies, current medications, past family history, past medical history, past social history, past surgical history and problem list.    Review of Systems    Objective   Physical Exam   Constitutional: She appears well-developed and well-nourished. No distress.   HENT:   Mouth/Throat: Oropharynx is clear and moist.   Cardiovascular: Normal rate.   Pulmonary/Chest: Effort normal.   Musculoskeletal:        Right shoulder: She exhibits decreased range of motion and tenderness.        Lumbar back: She exhibits decreased range of motion and tenderness.   Neurological: She is alert. She has normal strength. She displays no tremor. No cranial nerve deficit or sensory deficit. Gait abnormal.   Nursing note and vitals reviewed.    In office UA is unremarkable      Assessment/Plan   Siobhan was seen today for follow-up.    Diagnoses and all orders for this visit:    Hospital discharge follow-up    TIA (transient ischemic attack)    Bilateral sacral insufficiency fracture, sequela  -      Discontinue: HYDROcodone-acetaminophen (NORCO) 5-325 MG per tablet; Take 1 tablet by mouth 2 (Two) Times a Day As Needed for Severe Pain .  -     HYDROcodone-acetaminophen (NORCO) 5-325 MG per tablet; Take 1 tablet by mouth 2 (Two) Times a Day As Needed for Severe Pain .    Chronic UTI  -     Cancel: POC Pregnancy, Urine  -     Cancel: POC Urinalysis Dipstick, Automated    took norco in the hospital and did fine with it, ie no side effects  Between her fractured lumbar and sacrum and end stage shoulder, in constant pain.  Will have to post pone her shoulder replacement.    There are no Patient Instructions on file for this visit.    Medications Discontinued During This Encounter   Medication Reason   • vitamin C (ASCORBIC ACID) 500 MG tablet *Therapy completed   • Cobalamine Combinations (B-12) 100-5000 MCG sublingual tablet *Therapy completed   • HYDROcodone-acetaminophen (NORCO) 5-325 MG per tablet Reorder        Return in about 2 months (around 6/29/2019) for new medication follow up.    Dr. Marino Higgins  [place], [place]

## 2019-05-08 ENCOUNTER — LAB (OUTPATIENT)
Dept: OTHER | Facility: HOSPITAL | Age: 84
End: 2019-05-08

## 2019-05-08 DIAGNOSIS — D47.2 MONOCLONAL GAMMOPATHY: ICD-10-CM

## 2019-05-08 LAB
ALBUMIN SERPL-MCNC: 4 G/DL (ref 3.5–5.2)
ALBUMIN/GLOB SERPL: 1.1 G/DL
ALP SERPL-CCNC: 89 U/L (ref 39–117)
ALT SERPL W P-5'-P-CCNC: 14 U/L (ref 1–33)
ANION GAP SERPL CALCULATED.3IONS-SCNC: 10.7 MMOL/L
AST SERPL-CCNC: 16 U/L (ref 1–32)
BASOPHILS # BLD AUTO: 0.02 10*3/MM3 (ref 0–0.2)
BASOPHILS NFR BLD AUTO: 0.4 % (ref 0–1.5)
BILIRUB SERPL-MCNC: 0.2 MG/DL (ref 0.1–1.2)
BUN BLD-MCNC: 26 MG/DL (ref 8–23)
BUN/CREAT SERPL: 24.3 (ref 7–25)
CALCIUM SPEC-SCNC: 9.7 MG/DL (ref 8.6–10.5)
CHLORIDE SERPL-SCNC: 99 MMOL/L (ref 98–107)
CO2 SERPL-SCNC: 28.3 MMOL/L (ref 22–29)
CREAT BLD-MCNC: 1.07 MG/DL (ref 0.57–1)
DEPRECATED RDW RBC AUTO: 43.3 FL (ref 37–54)
EOSINOPHIL # BLD AUTO: 0.08 10*3/MM3 (ref 0–0.4)
EOSINOPHIL NFR BLD AUTO: 1.4 % (ref 0.3–6.2)
ERYTHROCYTE [DISTWIDTH] IN BLOOD BY AUTOMATED COUNT: 12.4 % (ref 12.3–15.4)
GFR SERPL CREATININE-BSD FRML MDRD: 49 ML/MIN/1.73
GLOBULIN UR ELPH-MCNC: 3.7 GM/DL
GLUCOSE BLD-MCNC: 111 MG/DL (ref 65–99)
HCT VFR BLD AUTO: 38.3 % (ref 34–46.6)
HGB BLD-MCNC: 12.5 G/DL (ref 12–15.9)
IMM GRANULOCYTES # BLD AUTO: 0.04 10*3/MM3 (ref 0–0.05)
IMM GRANULOCYTES NFR BLD AUTO: 0.7 % (ref 0–0.5)
LYMPHOCYTES # BLD AUTO: 0.9 10*3/MM3 (ref 0.7–3.1)
LYMPHOCYTES NFR BLD AUTO: 16.3 % (ref 19.6–45.3)
MCH RBC QN AUTO: 30.7 PG (ref 26.6–33)
MCHC RBC AUTO-ENTMCNC: 32.6 G/DL (ref 31.5–35.7)
MCV RBC AUTO: 94.1 FL (ref 79–97)
MONOCYTES # BLD AUTO: 0.43 10*3/MM3 (ref 0.1–0.9)
MONOCYTES NFR BLD AUTO: 7.8 % (ref 5–12)
NEUTROPHILS # BLD AUTO: 4.06 10*3/MM3 (ref 1.7–7)
NEUTROPHILS NFR BLD AUTO: 73.4 % (ref 42.7–76)
NRBC BLD AUTO-RTO: 0 /100 WBC (ref 0–0.2)
PLATELET # BLD AUTO: 179 10*3/MM3 (ref 140–450)
PMV BLD AUTO: 8.3 FL (ref 6–12)
POTASSIUM BLD-SCNC: 4.6 MMOL/L (ref 3.5–5.2)
PROT SERPL-MCNC: 7.7 G/DL (ref 6–8.5)
RBC # BLD AUTO: 4.07 10*6/MM3 (ref 3.77–5.28)
SODIUM BLD-SCNC: 138 MMOL/L (ref 136–145)
WBC NRBC COR # BLD: 5.53 10*3/MM3 (ref 3.4–10.8)

## 2019-05-08 PROCEDURE — 36415 COLL VENOUS BLD VENIPUNCTURE: CPT

## 2019-05-08 PROCEDURE — 85025 COMPLETE CBC W/AUTO DIFF WBC: CPT | Performed by: INTERNAL MEDICINE

## 2019-05-08 PROCEDURE — 83883 ASSAY NEPHELOMETRY NOT SPEC: CPT | Performed by: INTERNAL MEDICINE

## 2019-05-08 PROCEDURE — 80053 COMPREHEN METABOLIC PANEL: CPT | Performed by: INTERNAL MEDICINE

## 2019-05-08 PROCEDURE — 86334 IMMUNOFIX E-PHORESIS SERUM: CPT | Performed by: INTERNAL MEDICINE

## 2019-05-08 PROCEDURE — 84165 PROTEIN E-PHORESIS SERUM: CPT | Performed by: INTERNAL MEDICINE

## 2019-05-08 PROCEDURE — 82784 ASSAY IGA/IGD/IGG/IGM EACH: CPT | Performed by: INTERNAL MEDICINE

## 2019-05-10 LAB
ALBUMIN SERPL-MCNC: 3.7 G/DL (ref 2.9–4.4)
ALBUMIN/GLOB SERPL: 1 {RATIO} (ref 0.7–1.7)
ALPHA1 GLOB FLD ELPH-MCNC: 0.3 G/DL (ref 0–0.4)
ALPHA2 GLOB SERPL ELPH-MCNC: 0.8 G/DL (ref 0.4–1)
B-GLOBULIN SERPL ELPH-MCNC: 1.1 G/DL (ref 0.7–1.3)
GAMMA GLOB SERPL ELPH-MCNC: 1.9 G/DL (ref 0.4–1.8)
GLOBULIN SER CALC-MCNC: 4 G/DL (ref 2.2–3.9)
IGA SERPL-MCNC: 93 MG/DL (ref 64–422)
IGG SERPL-MCNC: 1775 MG/DL (ref 700–1600)
IGM SERPL-MCNC: 32 MG/DL (ref 26–217)
INTERPRETATION SERPL IEP-IMP: ABNORMAL
KAPPA LC SERPL-MCNC: 24.7 MG/L (ref 3.3–19.4)
KAPPA LC/LAMBDA SER: 2.4 {RATIO} (ref 0.26–1.65)
LAMBDA LC FREE SERPL-MCNC: 10.3 MG/L (ref 5.7–26.3)
Lab: ABNORMAL
M-SPIKE: 1.7 G/DL
PROT SERPL-MCNC: 7.7 G/DL (ref 6–8.5)

## 2019-05-21 ENCOUNTER — APPOINTMENT (OUTPATIENT)
Dept: OTHER | Facility: HOSPITAL | Age: 84
End: 2019-05-21

## 2019-05-21 ENCOUNTER — APPOINTMENT (OUTPATIENT)
Dept: ONCOLOGY | Facility: CLINIC | Age: 84
End: 2019-05-21

## 2019-05-22 ENCOUNTER — APPOINTMENT (OUTPATIENT)
Dept: LAB | Facility: HOSPITAL | Age: 84
End: 2019-05-22

## 2019-05-22 ENCOUNTER — OFFICE VISIT (OUTPATIENT)
Dept: ONCOLOGY | Facility: CLINIC | Age: 84
End: 2019-05-22

## 2019-05-22 VITALS
RESPIRATION RATE: 18 BRPM | HEART RATE: 89 BPM | DIASTOLIC BLOOD PRESSURE: 113 MMHG | HEIGHT: 64 IN | OXYGEN SATURATION: 96 % | TEMPERATURE: 98.1 F | BODY MASS INDEX: 32.33 KG/M2 | WEIGHT: 189.4 LBS | SYSTOLIC BLOOD PRESSURE: 185 MMHG

## 2019-05-22 DIAGNOSIS — D47.2 MONOCLONAL GAMMOPATHY: Primary | ICD-10-CM

## 2019-05-22 PROCEDURE — G0463 HOSPITAL OUTPT CLINIC VISIT: HCPCS | Performed by: INTERNAL MEDICINE

## 2019-05-22 PROCEDURE — 99214 OFFICE O/P EST MOD 30 MIN: CPT | Performed by: INTERNAL MEDICINE

## 2019-05-22 NOTE — PROGRESS NOTES
Today the patient reports that she has had a TIA since the last visit. She also has new fractures in the sacrum and the L4 and the L5.  Subjective recent studies reviewed    REASON FOR CONSULTATION: Postoperative anemia,?  Myeloma  Provide an opinion on any further workup or treatment                             REQUESTING PHYSICIAN: Marino Higgins MD      History of Present Illness         The patient is an 83-year-old female with a history of essential hypertension, chronic systolic heart failure, diabetes, hyperlipidemia and COPD who experienced a try malleolar ankle fracture on the right admitted February 26 through March 02, 2018.  She evidently has sustained a fracture in January of this year.     She has a significant history that her daughter describes as being told of a right frontal bone lesion at [facility].  Review of [facility] care everywhere does include a CT scan of the brain in 2015 significant for lytic right frontal bone lesion that's nonspecific but raised the possibility of myeloma or metastatic disease.  The patient has a significant additional history of renal cell carcinoma with resection many years ago and this led to a whole body bone scan May 29, 2015 with no abnormal uptake in the right frontal bone again leaving the possibility of a myelomatous lesion or benign skull lesion.  Further history includes a review by Dr. Barnard in June 2016 for numerable tiny noncalcified pulmonary nodules that are felt to be sequela level granulomatous lung disease but no significant change seen on several CAT scans.     The patient, thereafter, head evaluations at [facility] for cardiovascular issues including adjustment of her medications, treatment for LV dysfunction admission for weakness associated with hyponatremia in February-medication related.  This in part evidently lead to a right trimalleolar ankle fracture that required repair on February 26, later development of gabapentin and  treatment-induced encephalopathy which improving medication was discontinued.  Later unexpectedly in mid May she had further pain down her left foot revealing a corner avulsion fracture at the base the middle phalanx second digit-?  Etiology and further as result of chronic back pain thoracic films revealing no acute osseous abnormality, multilevel degenerative disc disease.  There is no mention of potential myelomatous lesions.  As she is seen with her daughter May 21, 2018 they both indicate that she's been reviewed by neurology memory dysfunction  and now also struggles with a progressive dementia.     She was seen by her primary physician May 8 again for follow-up of her fracture as well as CKD stage I thank you blood loss is causing postoperative anemia.  Her studies noted March 18 included H&H of 9.5 and 30.9 with a white count of 5960, MCV of 92.0, MCH 28.3, MCHC of 30.7, platelet count of 185,000 and normal automated differential, creatinine of 0.94 with BUN of 12 and EGFR of 57.  Her follow-up H&H May 8 were 11.9 and 37.1 white count of 4810,, platelet count of 213,000 with MCV of 90.9, MCH of 29.2 and MCHC 32.1.  Additional exams revealed an albumin of 3.3, gammaglobulin of 1.9, M spike of 1.7, globulin of 4.1.  We are asked to see her May 21 for her slow recovery from postoperative anemia but recognizes her history of hyponatremia, fluid restriction and gradual recovery to her current hemoglobin of above 12 g percent suggestingcomponent hemodilution ongoing previously.  There is however her monoclonal gammopathy and a history has once again reviewed concerning this MGUS.     The patient underwent a series of studies including BUN and creatinine of 22 and 1.17, total protein of 8.9, sodium of 139, ferritin of 45.7, iron is 79, TIBC of 367, 22% saturation, B12 greater than 2000, MMA of 152 beta-2 microglobulin 3.1 and paraprotein studies-IgG of 2069, IgA of 88, IgM 41, M spike of 1.8 and light chains with  free light kappa 24.1, free lambda light chain 14.9 with a ratio of 1.62.  These studies would be consistent with a monoclonal gammopathy of unknown significance.  We discussed these findings in great detail and the patient is seen June 01, 2018.  She and her daughter are quite concerned about her cognitive decline and discusses with supportive oncology- Rena Rebolledo-with plans for the patient to be seen at the [facility] in the next 4 weeks.    The patient is follow-up August 20, 2018.  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  Additional exams included her proteins with an IgG of 2122, IgA 98, IgM of 53, M spike of 2.3 and CBC with H&H of 12.4 and 40.5 white count of 4620 and platelet count of 165,000.    The patient is next seen August 20, 2018.  Her exams do not suggest progression of myeloma though she has developed a swelling in her right shoulder fairly acutely that's limiting motion.  She was not able to be seen by geriatric physician and unfortunately is going to be seen by neurologist at [facility] for assessment of dementia.    The patient went on to have an assessment with MRI of the right shoulder showing a supraspinatus tear and enlargement of the bursa.  This is becoming quite painful for her and we discussed orthopedic assessment.  There is, fortunately, no evidence of malignancy involved here.       The patient is next seen December 05, 2018 with repeat paraprotein is included IgG of 1941, M spike of 1.8, kappa/lambda ratio of 1.7, normal CBC, CMP with creatinine 1.12, BUN 19 and total protein 8.6.  The studies are not significant change from previous.  We have also discussed reassessment per orthopedics and follow-up thereafter.    The patient is next seen with her daughter May 22, 2019.  She has had a number of issues since last seen including subacute  fractures involving her spine, worsening osteoarthritis of her right shoulder leading to likely shoulder repair in the near future and a recent TIA.  Her recent testing per paraprotein analysis is unchanged from previous checked May 8, 2019 with M spike of 1.7, IgG of 1775, kappa lambda ratio of 2.40.  Past Medical History:   Diagnosis Date   • Acute kidney failure (CMS/HCC)    • Anemia    • Anxiety    • Arthritis    • Asthma    • Cancer (CMS/HCC) 2004    Left kidney tumor   • Cataract    • CHF (congestive heart failure) (CMS/HCC)    • Chronic kidney disease    • COPD (chronic obstructive pulmonary disease) (CMS/HCC)    • Depression    • Diabetes mellitus (CMS/HCC)     Type 2   • GERD (gastroesophageal reflux disease)    • H/O Chronic systolic heart failure 02/2018   • H/O Gout    • H/O LV dysfunction    • H/O NICM (nonischemic cardiomyopathy) 2017   • H/O PONV (postoperative nausea and vomiting)    • H/O Syncope and collapse 2015   • H/O Valvular disease 2015    Mitral/Tricuspid   • Hyperlipidemia    • Hypertension    • Hyponatremia 02/2018   • Infectious viral hepatitis    • LBBB (left bundle branch block)    • Liver disease    • Myeloma (CMS/HCC)    • Osteoporosis    • Pacemaker    • Shortness of breath    • Stroke (CMS/HCC)     Memory loss   • TIA (transient ischemic attack)         Past Surgical History:   Procedure Laterality Date   • CARDIAC CATHETERIZATION      mild dz, no stents   • CATARACT EXTRACTION     • CHOLECYSTECTOMY     • COLONOSCOPY  12/2005    Polyps x4   • EYE SURGERY      Implants   • FOOT SURGERY Right 02/2018    ORIF   • HEMORRHOIDECTOMY     • JOINT REPLACEMENT Right 2011   • KIDNEY SURGERY     • NEPHRECTOMY Left 11/2004   • ORIF ANKLE FRACTURE Right 02/2018    Trimalleolar; delayed healing   • PACEMAKER IMPLANTATION  05/29/2015    BlackArrowtronic dual chamber/Dr. Weber        Current Outpatient Medications on File Prior to Visit   Medication Sig Dispense Refill   • Acetaminophen (TYLENOL 8 HOUR  PO) Take 650 mg by mouth Take As Directed. 5x per day   2 in am, 1 at lunch, 2 pm     • albuterol (PROVENTIL HFA;VENTOLIN HFA) 108 (90 Base) MCG/ACT inhaler Inhale 2 puffs.     • allopurinol (ZYLOPRIM) 100 MG tablet Take 1 tablet by mouth Daily. 90 tablet 2   • amLODIPine (NORVASC) 5 MG tablet Take 5 mg by mouth Every Morning. Pt started on Monday 9/3     • buPROPion XL (WELLBUTRIN XL) 150 MG 24 hr tablet TAKE ONE TABLET BY MOUTH EVERY MORNING 30 tablet 2   • carvedilol (COREG) 25 MG tablet Take 25 mg by mouth 2 (Two) Times a Day With Meals.     • clopidogrel (PLAVIX) 75 MG tablet Take 1 tablet by mouth Daily. 90 tablet 2   • diclofenac (VOLTAREN) 1 % gel gel Apply 4 g topically to the appropriate area as directed 4 (Four) Times a Day As Needed (shoulder joint pain). 100 g 5   • famotidine (PEPCID) 20 MG tablet Take 20 mg by mouth Daily.     • hydrALAZINE (APRESOLINE) 50 MG tablet Take 50 mg by mouth 3 (Three) Times a Day.     • HYDROcodone-acetaminophen (NORCO) 5-325 MG per tablet Take 1 tablet by mouth 2 (Two) Times a Day As Needed for Severe Pain . 60 tablet 0   • Multiple Vitamins-Minerals (CENTRUM PO) Take  by mouth Daily.     • Probiotic Product (PROBIOTIC PO) Take  by mouth.     • vitamin D (ERGOCALCIFEROL) 58973 units capsule capsule Take 1 capsule by mouth 1 (One) Time Per Week. 4 capsule 11     No current facility-administered medications on file prior to visit.         ALLERGIES:    Allergies   Allergen Reactions   • Hydromorphone Swelling   • Atenolol    • Codeine    • Diovan [Valsartan] Dizziness and Cough     Other reaction(s): Cough, Dizziness   • Gabapentin Confusion and Other (See Comments)   • Hydrocodone Nausea And Vomiting and Swelling   • Oxycodone Swelling        Social History     Socioeconomic History   • Marital status:      Spouse name: Not on file   • Number of children: 2   • Years of education: High School   • Highest education level: Not on file   Occupational History      "Employer: RETIRED   Tobacco Use   • Smoking status: Never Smoker   • Smokeless tobacco: Never Used   Substance and Sexual Activity   • Alcohol use: No   • Drug use: No   • Sexual activity: Not Currently     Partners: Male        Family History   Problem Relation Age of Onset   • Heart disease Mother    • Hypertension Mother    • Heart disease Father    • Hypertension Father    • Ovarian cancer Sister 50   • Dementia Sister    • Hypertension Sister    • Heart disease Sister    • Liver disease Sister    • Drug abuse Sister    • Stroke Sister    • Early death Sister    • Heart disease Brother    • Hypertension Brother    • Diabetes Other    • Anemia Other    • Cancer Other         Renal cell carcinoma   • Heart disease Other    • Hypertension Other    • Diabetes Other         Review of Systems   Constitutional: Positive for fatigue.   Respiratory: Negative for chest tightness, shortness of breath and wheezing.    Gastrointestinal: Negative for abdominal pain, constipation, diarrhea, nausea and vomiting.   Neurological: Negative for weakness.      Gen.: Variable weight loss, chills, night sweats, fatigue, cold sensitivity  ENT: Progressive deafness bilaterally  Respiratory: Chronic cough and shortness of breath  Gastrointestinal: Poor appetite, chronic indigestion, periodic diarrhea  Genitourinary: Increasing nocturia  Skeletal: Chronic back pain, inflamed joints for many years  Neurologic: Headaches, dizziness, difficulty in ambulation    Objective     Vitals:    05/22/19 1620   Height: 162.6 cm (64.02\")     Current Status 12/5/2018   ECOG score 1       Physical Exam  repeat physical exam June 01, 2018  Constitutional: She appears well-developed and well-nourished using a walker to aid ambulation  Cardiovascular: Normal rate.    Pulmonary/Chest: Effort normal.  Clear bilaterally to auscultation and percussion   Cardiovascular: Normal rate regular rhythm without murmur or gallop  Abdomen: Soft, nontender, moderately " obese without hepatosplenomegaly or ascites   Musculoskeletal: Trace edema bilateral lower extremities.   Further examination of right shoulder reveals a bulbous protuberance measuring approximately 5 cm across nonerythematous, nontender, movable with a rubbery consistency is unchanged from previous    RECENT LABS:  Hematology WBC   Date Value Ref Range Status   05/08/2019 5.53 3.40 - 10.80 10*3/mm3 Final   05/08/2018 4.81 4.80 - 10.80 10*3/mm3 Final     RBC   Date Value Ref Range Status   05/08/2019 4.07 3.77 - 5.28 10*6/mm3 Final   05/08/2018 4.08 (L) 4.20 - 5.40 10*6/mm3 Final     Hemoglobin   Date Value Ref Range Status   05/08/2019 12.5 12.0 - 15.9 g/dL Final     Hematocrit   Date Value Ref Range Status   05/08/2019 38.3 34.0 - 46.6 % Final     Platelets   Date Value Ref Range Status   05/08/2019 179 140 - 450 10*3/mm3 Final      MRI OF THE BRAIN WITH AND WITHOUT CONTRAST - 08/31/2018  IMPRESSION:  1. Since prior MRI of the brain at [facility] on  05/23/2015, there has been slight progression in the mild-to-moderate  small vessel disease in the cerebral and central pontine white matter;  otherwise there is no change, there remains chronic partial  opacification of the left mastoid air cells with fluid  2. The remainder of MRI of the head is within normal limits with no  evidence of metastatic disease of the head.      RIGHT SHOULDER MRI WITH AND WITHOUT CONTRAST August 31, 2018  IMPRESSION:  Advanced osteoarthritic change at the right glenohumeral  joint with a small (4 mm wide), full-thickness, posterior supraspinatus  tendon tear. An effusion in the glenohumeral joint and distending the  subacromial subdeltoid bursa is associated with some synovial thickening  and enhancement as would be expected given the advanced arthritic  change. The distended bursa may be palpable as an area of swelling over  the anterolateral aspect of the shoulder. There is no evidence of tumor  around the  shoulder.        Assessment/Plan    .  The patient is an 83-year-old female with a somewhat complex history including hypertension, systolic heart failure, diabetes, hyperlipidemia, COPD, slowly progressive memory dysfunction and recent development of further complications including trimalleolar fracture on the right with admission later in February to March for repair at which time she was experiencing hyponatremia from Celexa use.  After surgery she was transferred to a nursing facility where this medication, unfortunately, continued as did her hyponatremia and there is been some time spent trying to improve this issue for her.  Following this without, she injured her left foot and had sustained an avulsion fracture at the base of the middle phalanx.  This too has been addressed orthopedically and she is slowly recovering.  All of this is somewhat complicated by her neurologic decline with memory dysfunction and apparent dementia.    As she was slow in recovery for her anemia status after her most recent surgery we are asked to see her in consultation finding that she has improved to nearly normal hemoglobin and hematocrit levels but does have a monoclonal gammopathy with a history, as described, of a potential skull lesion in 2015.  Her subsequent plain films do not demonstrate other lytic lesions including recently performed thoracic spine films.  We've discussed this may have occurred on the basis of progressive osteoporosis rather than myeloma and that the patient's anemia may well be improved as result of previous hemodilution-secondary to medication induced SIADH.  After considerable discussion with the patient and her daughter we went on to assess as described above revealing evidence of osteopenia and MGUS.  As the patient is seen June 1 she is stable hematologically without progression of anemia further.  After additional 30 minutes discussion we obtain follow-up laboratory exams and bone survey that  failed to show any significant progression of disease.  The patient's back pain appears to have stabilized though she is having right shoulder abnormalities that, while consistent with lipoma, should be assessed under the circumstances and we plan an MRI of the right shoulder.plan.we proceeded with MRI of the right shoulder showed evidence of a right supraspinatus tear and bursa enlargement.  She will need an orthopedic assessment concerning this and will help schedule that for her. The patient was seen by orthopedics and treated symptomatically as follows with Dr. Boswell at this point.  We do not find progression of her MGUS we will assess her in approximately 6 months.  Please note that neurocognitive testing is also scheduled December 11.  Following this the patient had additional orthopedic issues leading to assessment by both orthopedics and spine surgery.  Unfortunately she also experienced a TIA in her right shoulder surgery was held.  She is seen in office May 22, 2019 without any evidence on her studies digestive of a myelomatous involvement per bone.  Additionally paraprotein studies are essentially unchanged.  Hematologically she could proceed to right shoulder surgery repair which is her major issue at this point.  We have discussed her findings over 30 additional minutes today with the patient's dementia making it difficult to communicate the with her daughters considerable aid in translation.    Plan:  *Follow-up with orthopedics as planned  *Repeat assessment 1 year with paraprotein studies 1 week prior.

## 2019-07-10 ENCOUNTER — OFFICE VISIT (OUTPATIENT)
Dept: FAMILY MEDICINE CLINIC | Facility: CLINIC | Age: 84
End: 2019-07-10

## 2019-07-10 VITALS
HEART RATE: 88 BPM | SYSTOLIC BLOOD PRESSURE: 188 MMHG | OXYGEN SATURATION: 97 % | WEIGHT: 189 LBS | BODY MASS INDEX: 32.27 KG/M2 | DIASTOLIC BLOOD PRESSURE: 110 MMHG | HEIGHT: 64 IN

## 2019-07-10 DIAGNOSIS — N39.0 CHRONIC UTI: ICD-10-CM

## 2019-07-10 DIAGNOSIS — M80.00XD AGE-RELATED OSTEOPOROSIS WITH CURRENT PATHOLOGICAL FRACTURE WITH ROUTINE HEALING: ICD-10-CM

## 2019-07-10 DIAGNOSIS — I15.0 RENOVASCULAR HYPERTENSION: Primary | ICD-10-CM

## 2019-07-10 DIAGNOSIS — M84.48XS BILATERAL SACRAL INSUFFICIENCY FRACTURE, SEQUELA: ICD-10-CM

## 2019-07-10 DIAGNOSIS — I10 UNCONTROLLED HYPERTENSION: ICD-10-CM

## 2019-07-10 LAB
BILIRUB BLD-MCNC: NEGATIVE MG/DL
CLARITY, POC: CLEAR
COLOR UR: ABNORMAL
GLUCOSE UR STRIP-MCNC: NEGATIVE MG/DL
KETONES UR QL: NEGATIVE
LEUKOCYTE EST, POC: NEGATIVE
NITRITE UR-MCNC: NEGATIVE MG/ML
PH UR: 5 [PH] (ref 5–8)
PROT UR STRIP-MCNC: ABNORMAL MG/DL
RBC # UR STRIP: NEGATIVE /UL
SP GR UR: 1.01 (ref 1–1.03)
UROBILINOGEN UR QL: NORMAL

## 2019-07-10 PROCEDURE — 81002 URINALYSIS NONAUTO W/O SCOPE: CPT | Performed by: FAMILY MEDICINE

## 2019-07-10 PROCEDURE — 99214 OFFICE O/P EST MOD 30 MIN: CPT | Performed by: FAMILY MEDICINE

## 2019-07-10 RX ORDER — AMLODIPINE BESYLATE 10 MG/1
10 TABLET ORAL EVERY MORNING
Qty: 30 TABLET | Refills: 5 | Status: SHIPPED | OUTPATIENT
Start: 2019-07-10 | End: 2020-01-01

## 2019-07-10 RX ORDER — HYDROCODONE BITARTRATE AND ACETAMINOPHEN 5; 325 MG/1; MG/1
1 TABLET ORAL 3 TIMES DAILY
Qty: 90 TABLET | Refills: 0 | Status: ON HOLD | OUTPATIENT
Start: 2019-07-10 | End: 2019-01-01

## 2019-07-10 NOTE — PROGRESS NOTES
"  Chief Complaint   Patient presents with   • Follow-up   • Hypertension   • Urinary Tract Infection   • Shoulder Pain       Subjective      No further stroke like events since last ov    Reviewed dr Maxx's and Lisset's notes    Patient here for follow-up of elevated blood pressure.    She is not exercising and is adherent to a low-salt diet.    Blood pressure is not well controlled at home.   Cardiac symptoms: none.   Patient denies: chest pain.   Cardiovascular risk factors: advanced age (older than 55 for men, 65 for women) and hypertension.   Use of agents associated with hypertension: none.   History of target organ damage: chronic kidney disease, heart failure, left ventricular hypertrophy and stroke.  Patient is taking prescribed hypertension medications as prescribed without side effects.    daughter also wants her urine checked, chronic UTI    Labs reviewed drawn at [facility].      The following portions of the patient's history were reviewed and updated as appropriate: allergies, current medications, past medical history, past social history, past surgical history and problem list.    Review of Systems  A comprehensive review of systems was negative except for: Musculoskeletal: positive for arthralgias and stiff joints  Behavioral/Psych: positive for sleep disturbance and dementia         Vitals:    07/10/19 1325   BP: (!) 188/110   BP Location: Left arm   Patient Position: Sitting   Pulse: 88   SpO2: 97%   Weight: 85.7 kg (189 lb)   Height: 162.6 cm (64\")     BP Readings from Last 3 Encounters:   07/10/19 (!) 188/110   05/22/19 (!) 185/113   04/29/19 146/86     Objective      BP re check manual 178/100 left arm lg cuff    Gen: alert, pleasant.elderly. Mild dementia, using a walker. Daughter in the room with us.  HEENT: PERRL, EOMI intact, lids ok, ear canals clear, TMs normal, throat clear, nostrils normal  Neck: no bruit, no enlarged thyroid  Lungs: CTA  Heart: RR no murmur  ABD: soft , + BS  Pulses: " intact  Mood: stable  Right shoulder very limited painful ROM     Results for orders placed or performed in visit on 07/10/19   POCT urinalysis dipstick, manual   Result Value Ref Range    Color Chelo Yellow, Straw, Dark Yellow, Chelo    Clarity, UA Clear Clear    Glucose, UA Negative Negative, 1000 mg/dL (3+) mg/dL    Bilirubin Negative Negative    Ketones, UA Negative Negative    Specific Gravity  1.015 1.005 - 1.030    Blood, UA Negative Negative    pH, Urine 5.0 5.0 - 8.0    Protein, POC 30 mg/dL (A) Negative mg/dL    Urobilinogen, UA Normal Normal    Leukocytes Negative Negative    Nitrite, UA Negative Negative       Assessment/Plan   Hypertension, stage 2 Evidence of target organ damage: chronic kidney disease, heart failure, left ventricular hypertrophy, stroke and transient ischemic attack.    Siobhan was seen today for follow-up, hypertension, urinary tract infection and shoulder pain.    Diagnoses and all orders for this visit:    Renovascular hypertension    Uncontrolled hypertension    Age-related osteoporosis with current pathological fracture with routine healing    Bilateral sacral insufficiency fracture, sequela  -     HYDROcodone-acetaminophen (NORCO) 5-325 MG per tablet; Take 1 tablet by mouth 3 (Three) Times a Day.    Chronic UTI  -     POCT urinalysis dipstick, manual    Other orders  -     amLODIPine (NORVASC) 10 MG tablet; Take 1 tablet by mouth Every Morning.    will increase norco to TID for persistent right shoulder pain  Ok by me for total shoulder replacement.  We can start Fosamax 3 months post op  Lets double Norvasc to 10 qd    UA is clear.      Medication: increase to 10 of norvasc.  Follow up: 4 months and as needed.    There are no Patient Instructions on file for this visit.  Medications Discontinued During This Encounter   Medication Reason   • HYDROcodone-acetaminophen (NORCO) 5-325 MG per tablet Reorder   • amLODIPine (NORVASC) 5 MG tablet Reorder        Return in about 4 months  (around 11/10/2019).    Limit salt  Limit alcoholic drinks to 1 a day  Limit caffeine to 1-2 servings a day    Dr. Marino Higgins MD  [place], [place]  [facility].

## 2019-07-16 NOTE — TELEPHONE ENCOUNTER
A physical therapist called and said that they are there doing an eval today. Her BP was 200/108. She said she had to report this to you.     [phone]   FEVER

## 2019-09-03 DIAGNOSIS — E55.9 VITAMIN D DEFICIENCY: ICD-10-CM

## 2019-09-04 RX ORDER — ERGOCALCIFEROL 1.25 MG/1
CAPSULE ORAL
Qty: 4 CAPSULE | Refills: 10 | Status: SHIPPED | OUTPATIENT
Start: 2019-09-04 | End: 2020-01-01

## 2019-11-14 PROBLEM — Z51.89 ENCOUNTER FOR REHABILITATION: Status: ACTIVE | Noted: 2019-01-01

## 2019-11-14 NOTE — H&P
[facility] HOSPITALIST     Marino Higgins MD    CHIEF COMPLAINT: Rehabilitation for post operative non-traumatic rotator cuff tear, right    HISTORY OF PRESENT ILLNESS:    Patient had surgery at [facility] and is here now for rehabilitation.  Her daughter is in the room with her.  She is alert and oriented and knows me from a prior meeting in ER.  I have reviewed her medications with the nursing staff and ordered PT and OT.      Past Medical History:   Diagnosis Date   • Acute kidney failure (CMS/HCC)    • Anemia    • Anxiety    • Arthritis    • Asthma    • Cancer (CMS/HCC) 2004    Left kidney tumor   • Cataract    • CHF (congestive heart failure) (CMS/HCC)    • Chronic kidney disease    • COPD (chronic obstructive pulmonary disease) (CMS/HCC)    • Depression    • Diabetes mellitus (CMS/HCC)     Type 2   • GERD (gastroesophageal reflux disease)    • H/O Chronic systolic heart failure 02/2018   • H/O Gout    • H/O LV dysfunction    • H/O NICM (nonischemic cardiomyopathy) 2017   • H/O PONV (postoperative nausea and vomiting)    • H/O Syncope and collapse 2015   • H/O Valvular disease 2015    Mitral/Tricuspid   • Hyperlipidemia    • Hypertension    • Hyponatremia 02/2018   • Infectious viral hepatitis    • LBBB (left bundle branch block)    • Liver disease    • Myeloma (CMS/HCC)    • Osteoporosis    • Pacemaker    • Shortness of breath    • Stroke (CMS/HCC)     Memory loss   • TIA (transient ischemic attack)      Past Surgical History:   Procedure Laterality Date   • CARDIAC CATHETERIZATION      mild dz, no stents   • CATARACT EXTRACTION     • CHOLECYSTECTOMY     • COLONOSCOPY  12/2005    Polyps x4   • EYE SURGERY      Implants   • FOOT SURGERY Right 02/2018    ORIF   • FRACTURE SURGERY     • HEMORRHOIDECTOMY     • JOINT REPLACEMENT Right 2011   • KIDNEY SURGERY     • NEPHRECTOMY Left 11/2004   • ORIF ANKLE FRACTURE Right 02/2018    Trimalleolar; delayed healing   • PACEMAKER  IMPLANTATION  05/29/2015    Medtronic dual chamber/Dr. Weber     Family History   Problem Relation Age of Onset   • Heart disease Mother    • Hypertension Mother    • Heart disease Father    • Hypertension Father    • Ovarian cancer Sister 50   • Dementia Sister    • Hypertension Sister    • Heart disease Sister    • Liver disease Sister    • Drug abuse Sister    • Stroke Sister    • Early death Sister    • Heart disease Brother    • Hypertension Brother    • Diabetes Other    • Anemia Other    • Cancer Other         Renal cell carcinoma   • Heart disease Other    • Hypertension Other    • Diabetes Other      Social History     Tobacco Use   • Smoking status: Never Smoker   • Smokeless tobacco: Never Used   Substance Use Topics   • Alcohol use: No   • Drug use: No     Medications Prior to Admission   Medication Sig Dispense Refill Last Dose   • Acetaminophen (TYLENOL 8 HOUR PO) Take 650 mg by mouth Take As Directed. 5x per day   2 in am, 1 at lunch, 2 pm   11/14/2019 at 0853   • allopurinol (ZYLOPRIM) 100 MG tablet Take 1 tablet by mouth Daily. 90 tablet 2 11/14/2019 at 0853   • amLODIPine (NORVASC) 10 MG tablet Take 1 tablet by mouth Every Morning. 30 tablet 5 11/14/2019 at 0853   • aspirin 81 MG chewable tablet Chew 81 mg Daily.      • buPROPion XL (WELLBUTRIN XL) 150 MG 24 hr tablet TAKE ONE TABLET BY MOUTH EVERY MORNING 30 tablet 2 11/14/2019 at 0853   • carvedilol (COREG) 25 MG tablet Take 25 mg by mouth 2 (Two) Times a Day With Meals.   11/14/2019 at 0853   • [START ON 11/15/2019] cephalexin (KEFLEX) 500 MG capsule Take 500 mg by mouth 2 (Two) Times a Day.      • clopidogrel (PLAVIX) 75 MG tablet Take 1 tablet by mouth Daily. 90 tablet 2 11/14/2019 at 0853   • [START ON 11/15/2019] docusate sodium (COLACE) 100 MG capsule Take 100 mg by mouth 2 (Two) Times a Day.      • [START ON 11/15/2019] Evolocumab (REPATHA) 140 MG/ML solution prefilled syringe Inject 140 mg under the skin into the appropriate area as  directed Every 14 (Fourteen) Days.      • hydrALAZINE (APRESOLINE) 50 MG tablet Take 50 mg by mouth 3 (Three) Times a Day.   11/14/2019 at 0853   • HYDROcodone-acetaminophen (NORCO) 5-325 MG per tablet Take 1 tablet by mouth 3 (Three) Times a Day. (Patient taking differently: Take 1 tablet by mouth 3 (Three) Times a Day. At  bedtime with food) 90 tablet 0 11/14/2019 at 0040   • ondansetron (ZOFRAN) 4 MG tablet Take 4 mg by mouth Every 6 (Six) Hours As Needed for Nausea or Vomiting.      • vitamin D (ERGOCALCIFEROL) 44356 units capsule capsule TAKE ONE CAPSULE BY MOUTH ONCE WEEKLY 4 capsule 10 11/1/2019 at Unknown time   • albuterol (PROVENTIL HFA;VENTOLIN HFA) 108 (90 Base) MCG/ACT inhaler Inhale 2 puffs.   Unknown at Unknown time   • diclofenac (VOLTAREN) 1 % gel gel Apply 4 g topically to the appropriate area as directed 4 (Four) Times a Day As Needed (shoulder joint pain). (Patient not taking: Reported on 11/14/2019) 100 g 5 Not Taking at Unknown time   • famotidine (PEPCID) 20 MG tablet Take 20 mg by mouth Daily.   Taking   • Multiple Vitamins-Minerals (CENTRUM PO) Take  by mouth Daily.   Unknown at Unknown time   • Probiotic Product (PROBIOTIC PO) Take  by mouth.   Taking     Allergies:  Hydromorphone; Atenolol; Codeine; Diovan [valsartan]; Gabapentin; and Oxycodone    Immunization History   Administered Date(s) Administered   • Fluzone High Dose =>65 Years ([employer] ONLY) 09/29/2016   • Influenza, Unspecified 01/26/2018   • Pneumococcal Conjugate 13-Valent (PCV13) 09/29/2015   • Zostavax 09/29/2015   • flucelvax quad pfs =>4 YRS 01/09/2019       REVIEW OF SYSTEMS:  Please see the above history of present illness for pertinent positives and negatives.  The remainder of the patient's systems have been reviewed and are negative.       Vital Signs  Temp:  [98.8 °F (37.1 °C)] 98.8 °F (37.1 °C)  Heart Rate:  [76] 76  Resp:  [16] 16  BP: (156)/(84) 156/84  Flowsheet Rows      First Filed Value   Admission Height   "154.9 cm (61\") Documented at 11/14/2019 1820   Admission Weight  88.2 kg (194 lb 6.4 oz) Documented at 11/14/2019 1820           Body mass index is 36.73 kg/m².      Physical Exam   Constitutional: She appears well-nourished.   Cardiovascular: Normal rate and regular rhythm.   Pulmonary/Chest: Effort normal and breath sounds normal.   Abdominal: Soft. Bowel sounds are normal.   Musculoskeletal:   Arm in a sling   Skin: Skin is warm and dry.   Nursing note and vitals reviewed.       Results Review:    I reviewed the patient's new clinical results.  Lab Results (most recent)     None          Imaging Results (Most Recent)     None        not reviewed    ECG/EMG Results (most recent)     None        not reviewed    Assessment/Plan     Rehabilitation for S/P surgery for nontraumatic rotator cuff tear right    Essential Hypertension    Chronic Systolic Heart Failure    DM2    HLD    COPD            I discussed the patients findings and my recommendations with patient and her daughter.     Effie Benitez DO  11/14/19  6:36 PM    Time: 30 min    "

## 2019-11-15 NOTE — PLAN OF CARE
Problem: Patient Care Overview  Goal: Plan of Care Review   11/15/19 0872   Coping/Psychosocial   Plan of Care Reviewed With patient   OTHER   Outcome Summary OT evaluation completed. pt required CGA for functional transfers and mobility x 108 feet with use of quad cane and then transitioned to SPC by physical therapist. ANticipate pt will require mod-max assist for adls including management of abd brace on R UE. pt would benefit from skilled ot services to address adl retraining, transfers and education with HEP. will see pt 5 x week x 1 week with anticipated discharge home with assist from daughter

## 2019-11-15 NOTE — NURSING NOTE
Patient arrived to facility via car and daughter in w/c.  Patient was assisted to bed by CNA and stand by assist.  Patient is alert and oriented and able to make needs known.  Denies pain or discomfort at this time.  Daughter went through all meds and admission process with nurse.  Patient is continent of bowel and bladder and able to make needs known.  Admission medications reviewed by this nurse and Dr. Benitez.

## 2019-11-15 NOTE — THERAPY EVALUATION
Acute Care - Occupational Therapy Initial Evaluation  ALYSE Luna     Patient Name: Siobhan Dueñas  : 1935  MRN: [id number]  Today's Date: 11/15/2019  Onset of Illness/Injury or Date of Surgery: 19  Date of Referral to OT: 19  Referring Physician: Emmanuel    Admit Date: 2019     No diagnosis found.  Patient Active Problem List   Diagnosis   • Mixed hyperlipidemia   • Renovascular hypertension   • Left bundle branch block (LBBB)   • Multiple pulmonary nodules   • Cerebrovascular accident (CMS/HCC)   • Type 2 diabetes mellitus without complication (CMS/HCC)   • Cobalamin deficiency   • CKD (chronic kidney disease) stage 1, GFR 90 ml/min or greater   • Renal cell cancer (CMS/HCC)   • Anxiety   • Vitamin D deficiency   • Medication monitoring encounter   • Chronic systolic heart failure (CMS/HCC)   • Left ventricular dysfunction   • Mitral regurgitation   • Osteoarthritis   • Routine adult health maintenance   • DDD (degenerative disc disease), thoracic   • Cardiomyopathy (CMS/HCC)   • Statin myopathy   • Trimalleolar fracture of ankle, closed, right, with routine healing, subsequent encounter   • Hospital discharge follow-up   • Monoclonal gammopathy   • Memory deficit   • Supraspinatus tendon rupture, right, sequela   • Hyperuricemia   • TIA (transient ischemic attack)   • Bilateral sacral insufficiency fracture   • Chronic right shoulder pain   • Lumbar transverse process fracture, closed, initial encounter (CMS/[facility])   • Right rotator cuff tear arthropathy   • Spinal stenosis of lumbar region   • Age-related osteoporosis with current pathological fracture with routine healing   • Chronic UTI   • Uncontrolled hypertension   • Encounter for rehabilitation     Past Medical History:   Diagnosis Date   • Acute kidney failure (CMS/HCC)    • Anemia    • Anxiety    • Arthritis    • Asthma    • Cancer (CMS/HCC) 2004    Left kidney tumor   • Cataract    • CHF (congestive heart failure) (CMS/HCC)    •  Chronic kidney disease    • COPD (chronic obstructive pulmonary disease) (CMS/HCC)    • Depression    • Diabetes mellitus (CMS/HCC)     Type 2   • GERD (gastroesophageal reflux disease)    • H/O Chronic systolic heart failure 02/2018   • H/O Gout    • H/O LV dysfunction    • H/O NICM (nonischemic cardiomyopathy) 2017   • H/O PONV (postoperative nausea and vomiting)    • H/O Syncope and collapse 2015   • H/O Valvular disease 2015    Mitral/Tricuspid   • Hyperlipidemia    • Hypertension    • Hyponatremia 02/2018   • Infectious viral hepatitis    • LBBB (left bundle branch block)    • Liver disease    • Myeloma (CMS/HCC)    • Osteoporosis    • Pacemaker    • Shortness of breath    • Stroke (CMS/HCC)     Memory loss   • TIA (transient ischemic attack)      Past Surgical History:   Procedure Laterality Date   • CARDIAC CATHETERIZATION      mild dz, no stents   • CATARACT EXTRACTION     • CHOLECYSTECTOMY     • COLONOSCOPY  12/2005    Polyps x4   • EYE SURGERY      Implants   • FOOT SURGERY Right 02/2018    ORIF   • FRACTURE SURGERY     • HEMORRHOIDECTOMY     • JOINT REPLACEMENT Right 2011   • KIDNEY SURGERY     • NEPHRECTOMY Left 11/2004   • ORIF ANKLE FRACTURE Right 02/2018    Trimalleolar; delayed healing   • PACEMAKER IMPLANTATION  05/29/2015    Cellytronic dual chamber/Dr. Weber          OT ASSESSMENT FLOWSHEET (last 12 hours)      Occupational Therapy Evaluation     Row Name 11/15/19 0936                   OT Evaluation Time/Intention    Subjective Information  complains of;pain  -JJ        Document Type  evaluation  -JJ        Mode of Treatment  occupational therapy  -JJ        Patient Effort  adequate  -JJ        Symptoms Noted During/After Treatment  none  -JJ           General Information    Patient Profile Reviewed?  yes  -JJ        Onset of Illness/Injury or Date of Surgery  11/11/19  -JJ        Referring Physician  Emmanuel  -JEILEEN        Patient Observations  alert;cooperative;agree to therapy  -JJ         Patient/Family Observations  pt supine in bed, agreed to evaluation  -JJ        Prior Level of Function  -- see pertinent hx of current problem  -JJ        Equipment Currently Used at Home  -- has w/c, bath bench, BSC and long handled ae,4 wheel walker  -JJ        Pertinent History of Current Functional Problem  pt with rotator cuff tear admitted to hospital for R reverse total shoulder sx on 11/11. pt now admited to SNF for further rehab and medical management  -JJ        Existing Precautions/Restrictions  fall;non-weight bearing NWB R UE  -JJ        Risks Reviewed  patient:;increased discomfort  -JJ        Benefits Reviewed  patient:;improve function;increase independence  -JJ        Barriers to Rehab  none identified  -JJ           Relationship/Environment    Lives With  child([place]no), adult pt states staying with daughter at discharge  -JJ           Resource/Environmental Concerns    Current Living Arrangements  home/apartment/condo  -JJ           Home Main Entrance    Number of Stairs, Main Entrance  -- pt states a flight of stairs into daughters home  -JJ        Stair Railings, Main Entrance  railings on both sides of stairs  -JJ           Cognitive Assessment/Interventions    Additional Documentation  Cognitive Assessment/Intervention (Group)  -JJ           Cognitive Assessment/Intervention- PT/OT    Orientation Status (Cognition)  oriented x 3  -JJ        Follows Commands (Cognition)  WFL  -JJ        Personal Safety Interventions  gait belt;nonskid shoes/slippers when out of bed  -JJ           Safety Issues, Functional Mobility    Safety Issues Affecting Function (Mobility)  positioning of assistive device  -JJ           Mobility Assessment/Treatment    Extremity Weight-bearing Status  right upper extremity  -JJ        Right Upper Extremity (Weight-bearing Status)  non weight-bearing (NWB)  -JJ           Bed Mobility Assessment/Treatment    Bed Mobility Assessment/Treatment  scooting/bridging  -JJ         Supine-Sit [place] (Bed Mobility)  minimum assist (75% patient effort);verbal cues  -        Bed Mobility, Safety Issues  decreased use of arms for pushing/pulling  -        Assistive Device (Bed Mobility)  bed rails  -           Functional Mobility    Functional Mobility- Ind. Level  contact guard assist;verbal cues required  -        Functional Mobility- Device  quad cane;straight cane  -        Functional Mobility-Distance (Feet)  -- in hallway  -        Functional Mobility- Comment  pt transitioned from quad cane to SPC by physcial therapist during mobility  -           Transfer Assessment/Treatment    Transfer Assessment/Treatment  sit-stand transfer;stand-sit transfer  -           Sit-Stand Transfer    Sit-Stand [place] (Transfers)  contact guard;verbal cues  -        Assistive Device (Sit-Stand Transfers)  cane, quad;cane, straight  -           Stand-Sit Transfer    Stand-Sit [place] (Transfers)  contact guard;verbal cues  -        Assistive Device (Stand-Sit Transfers)  cane, straight;cane, quad  -           Bathing Assessment/Intervention    Bathing [place] Level  bathing skills;lower body;upper body;moderate assist (50% patient effort)  -           Upper Body Dressing Assessment/Training    Upper Body Dressing [place] Level  upper body dressing skills;moderate assist (50% patient effort)  -           Lower Body Dressing Assessment/Training    Lower Body Dressing [place] Level  lower body dressing skills;maximum assist (25% patient effort)  -           General ROM    GENERAL ROM COMMENTS  L UE AROm WFL, R UE immobilized in abd sling.  -J           MMT (Manual Muscle Testing)    General MMT Comments  L UE MMT WFL,  RUE immobilized in abd sling not tested  -JJ           Positioning and Restraints    Pre-Treatment Position  in bed  -J        Post Treatment Position  chair  -JJ        In Chair  notified nsg;reclined;call light within reach;encouraged  to call for assist  -           Pain Scale: Numbers Pre/Post-Treatment    Pain Scale: Numbers, Pretreatment  8/10  -JJ        Pain Scale: Numbers, Post-Treatment  8/10  -JJ        Pain Location - Side  Right  -JJ        Pain Location - Orientation  incisional  -JJ        Pain Location  shoulder  -JJ        Pain Intervention(s)  Medication (See MAR);Repositioned  -JJ           Wound 11/15/19 1322 Right shoulder Incision    Wound - Properties Group Date first assessed: 11/15/19  -KB Time first assessed: 1322  -KB Side: Right  -KB Location: shoulder  -KB Primary Wound Type: Incision  -KB       Plan of Care Review    Plan of Care Reviewed With  patient  -JJ           Clinical Impression (OT)    Date of Referral to OT  11/14/19  -        OT Diagnosis  decreased adl sp shoulder sx  -JJ        Prognosis (OT Eval)  good  -        Patient/Family Goals Statement (OT Eval)  pt states plan is to return to daughters house when discharged from facility  -        Criteria for Skilled Therapeutic Interventions Met (OT Eval)  yes;treatment indicated  -J        Rehab Potential (OT Eval)  good, to achieve stated therapy goals  -        Therapy Frequency (OT Eval)  5 times/wk  -        Predicted Duration of Therapy Intervention (Therapy Eval)  x 1 week  -        Care Plan Review (OT)  evaluation/treatment results reviewed;care plan/treatment goals reviewed;risks/benefits reviewed  -        Anticipated Discharge Disposition (OT)  home with assist;home with home health  -           Planned OT Interventions    Planned Therapy Interventions (OT Eval)  BADL retraining;functional balance retraining;transfer/mobility retraining  -        BADL Retraining  x  -J        Functional Balance Retraining  x  -JJ        Transfer/Mobility Retraining  x  -J           Transfer Goal 1 (OT)    Activity/Assistive Device (Transfer Goal 1, OT)  toilet;commode, 3-in-1;cane, straight  -        [place] Level/Cues Needed (Transfer  Goal 1, OT)  supervision required  -JJ        Time Frame (Transfer Goal 1, OT)  1 week  -JJ        Progress/Outcome (Transfer Goal 1, OT)  -- new goal  -JJ           Bathing Goal 1 (OT)    Activity/Assistive Device (Bathing Goal 1, OT)  upper body bathing;lower body bathing;long-handled sponge  -JJ        [place] Level/Cues Needed (Bathing Goal 1, OT)  minimum assist (75% or more patient effort)  -JJ        Time Frame (Bathing Goal 1, OT)  1 week  -JJ        Progress/Outcomes (Bathing Goal 1, OT)  -- new goal  -JJ           Dressing Goal 1 (OT)    Activity/Assistive Device (Dressing Goal 1, OT)  upper body dressing  -JJ        Time Frame (Dressing Goal 1, OT)  1 week  -JJ        Progress/Outcome (Dressing Goal 1, OT)  -- new goal  -JJ           Patient Education Goal (OT)    Activity (Patient Education Goal, OT)  pt will demonstrate independence with AROM of uninvolved joints of R UE  -JJ        [place]/Cues/Accuracy (Memory Goal 2, OT)  demonstrates adequately  -JJ        Time Frame (Patient Education Goal, OT)  1 week  -JJ        Progress/Outcome (Patient Education Goal, OT)  -- new goal  -JJ           Living Environment    Home Accessibility  stairs to enter home  -JJ          User Key  (r) = Recorded By, (t) = Taken By, (c) = Cosigned By    Initials Name Effective Dates    Mary Lew, OTR 06/22/16 -     Latricia Mims, LPNAVIN 10/07/16 -          Occupational Therapy Education     Title: PT OT SLP Therapies (In Progress)     Topic: Occupational Therapy (In Progress)     Point: ADL training (Done)     Description: Instruct learner(s) on proper safety adaptation and remediation techniques during self care or transfers.   Instruct in proper use of assistive devices.    Learning Progress Summary           Patient Acceptance, E,TB, VU by DAVIDSON at 11/15/2019  1:14 PM    Comment:  pt educated on safety with functional transfers, adls, and AROM of uninvolved joints                                User Key     Initials Effective Dates Name Provider Type Discipline     06/22/16 -  Mary Cade OTR Occupational Therapist OT                  OT Recommendation and Plan  Outcome Summary/Treatment Plan (OT)  Anticipated Discharge Disposition (OT): home with assist, home with home health  Planned Therapy Interventions (OT Eval): BADL retraining, functional balance retraining, transfer/mobility retraining  Therapy Frequency (OT Eval): 5 times/wk  Plan of Care Review  Plan of Care Reviewed With: patient  Plan of Care Reviewed With: patient  Outcome Summary: OT evaluation completed. pt required CGA for functional transfers and mobility x 108 feet with use of quad cane and then transitioned to SPC by physical therapist. ANticipate pt will require mod-max assist for adls including management of abd brace on R UE. pt would benefit from skilled ot services to address adl retraining, transfers and education with HEP. will see pt 5 x week x 1 week with anticipated discharge home with assist from daughter        Time Calculation:   Time Calculation- OT     Row Name 11/15/19 1319             Time Calculation-     OT Start Time  0935  -      OT Stop Time  1009  -      OT Time Calculation (min)  34 min  -      OT Non-Billable Time (min)  -- evaluation: 15 minutes, treatment/cotreat with PT:19 minutes  -        User Key  (r) = Recorded By, (t) = Taken By, (c) = Cosigned By    Initials Name Provider Type     Mary Cade OTR Occupational Therapist        Therapy Charges for Today     Code Description Service Date Service Provider Modifiers Qty    [id number]  OT SELF CARE/MGMT/TRAIN EA 15 MIN 11/15/2019 Mary Cade OTR GO 1    [id number]  OT EVAL LOW COMPLEXITY 1 11/15/2019 Mary Cade OTR GO 1               CARMEN Howard  11/15/2019

## 2019-11-15 NOTE — PLAN OF CARE
Problem: Patient Care Overview  Goal: Plan of Care Review   11/15/19 1152   Coping/Psychosocial   Plan of Care Reviewed With patient   OTHER   Outcome Summary PT Evaluation Complete: Patient performs supine to sit transfer with min A, sit to/from stand transfers with CGA and gait x 108 feet with CGA with use of quad cane and straight cane. Gait initiated with quad cane however patient unable to safely manage device despite repeated cues. Transitioned to straight cane, patient with improved safety with use of straight point cane. Patient would benefit from skilled PT services to address deficits in functional mobility. Plan to see patient daily 5x/week x 1 week. Anticipate discharge home with home health and assist from daughter as needed.

## 2019-11-15 NOTE — PLAN OF CARE
Problem: Patient Care Overview  Goal: Plan of Care Review  Outcome: Ongoing (interventions implemented as appropriate)   11/15/19 9921   Coping/Psychosocial   Plan of Care Reviewed With patient       Problem: Fall Risk (Adult)  Goal: Absence of Fall  Outcome: Ongoing (interventions implemented as appropriate)   11/15/19 4691   Fall Risk (Adult)   Absence of Fall making progress toward outcome

## 2019-11-15 NOTE — THERAPY EVALUATION
SNF - Physical Therapy Initial Evaluation  ALYSE Luna     Patient Name: Siobhan Dueñas  : 1935  MRN: [id number]  Today's Date: 11/15/2019   Onset of Illness/Injury or Date of Surgery: 19  Date of Referral to PT: 19  Referring Physician: Emmanuel      Admit Date: 2019    Visit Dx: No diagnosis found.  Patient Active Problem List   Diagnosis   • Mixed hyperlipidemia   • Renovascular hypertension   • Left bundle branch block (LBBB)   • Multiple pulmonary nodules   • Cerebrovascular accident (CMS/HCC)   • Type 2 diabetes mellitus without complication (CMS/HCC)   • Cobalamin deficiency   • CKD (chronic kidney disease) stage 1, GFR 90 ml/min or greater   • Renal cell cancer (CMS/HCC)   • Anxiety   • Vitamin D deficiency   • Medication monitoring encounter   • Chronic systolic heart failure (CMS/HCC)   • Left ventricular dysfunction   • Mitral regurgitation   • Osteoarthritis   • Routine adult health maintenance   • DDD (degenerative disc disease), thoracic   • Cardiomyopathy (CMS/HCC)   • Statin myopathy   • Trimalleolar fracture of ankle, closed, right, with routine healing, subsequent encounter   • Hospital discharge follow-up   • Monoclonal gammopathy   • Memory deficit   • Supraspinatus tendon rupture, right, sequela   • Hyperuricemia   • TIA (transient ischemic attack)   • Bilateral sacral insufficiency fracture   • Chronic right shoulder pain   • Lumbar transverse process fracture, closed, initial encounter (CMS/[facility])   • Right rotator cuff tear arthropathy   • Spinal stenosis of lumbar region   • Age-related osteoporosis with current pathological fracture with routine healing   • Chronic UTI   • Uncontrolled hypertension   • Encounter for rehabilitation     Past Medical History:   Diagnosis Date   • Acute kidney failure (CMS/HCC)    • Anemia    • Anxiety    • Arthritis    • Asthma    • Cancer (CMS/HCC) 2004    Left kidney tumor   • Cataract    • CHF (congestive heart failure) (CMS/HCC)    •  Chronic kidney disease    • COPD (chronic obstructive pulmonary disease) (CMS/HCC)    • Depression    • Diabetes mellitus (CMS/HCC)     Type 2   • GERD (gastroesophageal reflux disease)    • H/O Chronic systolic heart failure 02/2018   • H/O Gout    • H/O LV dysfunction    • H/O NICM (nonischemic cardiomyopathy) 2017   • H/O PONV (postoperative nausea and vomiting)    • H/O Syncope and collapse 2015   • H/O Valvular disease 2015    Mitral/Tricuspid   • Hyperlipidemia    • Hypertension    • Hyponatremia 02/2018   • Infectious viral hepatitis    • LBBB (left bundle branch block)    • Liver disease    • Myeloma (CMS/HCC)    • Osteoporosis    • Pacemaker    • Shortness of breath    • Stroke (CMS/HCC)     Memory loss   • TIA (transient ischemic attack)      Past Surgical History:   Procedure Laterality Date   • CARDIAC CATHETERIZATION      mild dz, no stents   • CATARACT EXTRACTION     • CHOLECYSTECTOMY     • COLONOSCOPY  12/2005    Polyps x4   • EYE SURGERY      Implants   • FOOT SURGERY Right 02/2018    ORIF   • FRACTURE SURGERY     • HEMORRHOIDECTOMY     • JOINT REPLACEMENT Right 2011   • KIDNEY SURGERY     • NEPHRECTOMY Left 11/2004   • ORIF ANKLE FRACTURE Right 02/2018    Trimalleolar; delayed healing   • PACEMAKER IMPLANTATION  05/29/2015    Enubilatronic dual chamber/Dr. Weber        PT ASSESSMENT (last 12 hours)      Physical Therapy Evaluation     Row Name 11/15/19 0935          PT Evaluation Time/Intention    Subjective Information  complains of;pain  -BP     Document Type  evaluation  -BP     Mode of Treatment  physical therapy  -BP     Patient Effort  adequate  -BP     Symptoms Noted During/After Treatment  none  -BP     Row Name 11/15/19 1051          General Information    Patient Profile Reviewed?  yes  -BP     Onset of Illness/Injury or Date of Surgery  11/11/19 admitted to SNF on 11/14   -BP     Referring Physician  Dr. Benitez  -BP     Patient Observations  alert;cooperative;agree to therapy  -BP      Patient/Family Observations  Patient supine in bed with HOB elevated. Agreeable to PT evaluation  -BP     Prior Level of Function  -- see pertinent history section   -BP     Equipment Currently Used at Home  -- 4 wheeled walker   -BP     Pertinent History of Current Functional Problem  Patient underwent an elective reverse total shoulder replacement on 11/11 due to a torn rotator cuff tendon. Patient now presents to SNF for rehab prior to return home with daughter. Per patient, she was independent with mobility with use of a four wheeled walker at baseline. She reports she is temporarily staying with her daughter. Her daughter assists her at baseline with ADL's. She plans to return to her daughters at discharge however her daughter works during the holiday season. Daughter not present during evaluation.   -BP     Existing Precautions/Restrictions  fall;non-weight bearing R UE   -BP     Risks Reviewed  patient:;LOB;increased discomfort  -BP     Benefits Reviewed  patient:;improve function;increase independence;increase strength  -BP     Barriers to Rehab  none identified  -BP     Row Name 11/15/19 0925          Relationship/Environment    Lives With  child(moreno), adult  -BP     Name(s) of Who Lives With Patient  Patient currently living with her daughter and plans to return there at discharge.   -BP     Row Name 11/15/19 0987          Resource/Environmental Concerns    Current Living Arrangements  home/apartment/condo  -BP     Row Name 11/15/19 0935          Living Environment    Home Accessibility  stairs to enter home  -BP     Row Name 11/15/19 0922          Home Main Entrance    Number of Stairs, Main Entrance  other (see comments) 12 stairs to enter home   -BP     Stair Railings, Main Entrance  railings on both sides of stairs  -BP     Row Name 11/15/19 0999          Cognitive Assessment/Interventions    Additional Documentation  Cognitive Assessment/Intervention (Group)  -BP     Row Name 11/15/19 5463           Cognitive Assessment/Intervention- PT/OT    Orientation Status (Cognition)  oriented x 3  -BP     Follows Commands (Cognition)  WFL  -BP     Personal Safety Interventions  gait belt;nonskid shoes/slippers when out of bed  -BP     Row Name 11/15/19 0935          Safety Issues, Functional Mobility    Safety Issues Affecting Function (Mobility)  positioning of assistive device  -BP     Row Name 11/15/19 0935          Mobility Assessment/Treatment    Extremity Weight-bearing Status  right upper extremity  -BP     Right Upper Extremity (Weight-bearing Status)  non weight-bearing (NWB)  -BP     Row Name 11/15/19 0935          Bed Mobility Assessment/Treatment    Bed Mobility Assessment/Treatment  supine-sit  -BP     Supine-Sit Wal[place] (Bed Mobility)  minimum assist (75% patient effort);verbal cues  -BP     Bed Mobility, Safety Issues  decreased use of arms for pushing/pulling  -BP     Assistive Device (Bed Mobility)  bed rails  -BP     Row Name 11/15/19 0935          Transfer Assessment/Treatment    Transfer Assessment/Treatment  sit-stand transfer;stand-sit transfer  -BP     Comment (Transfers)  Initiated with quad cane, transitioned to straight point cane during gait training.   -BP     Sit-Stand Waldo (Transfers)  contact guard  -BP     Stand-Sit Waldo (Transfers)  contact guard  -BP     Row Name 11/15/19 0935          Sit-Stand Transfer    Assistive Device (Sit-Stand Transfers)  cane, straight;cane, quad  -BP     Row Name 11/15/19 0935          Stand-Sit Transfer    Assistive Device (Stand-Sit Transfers)  cane, straight;cane, quad  -BP     Row Name 11/15/19 0935          Gait/Stairs Assessment/Training    Waldo Level (Gait)  contact guard;verbal cues  -BP     Assistive Device (Gait)  cane, quad;cane, straight  -BP     Distance in Feet (Gait)  108  -BP     Pattern (Gait)  swing-through  -BP     Deviations/Abnormal Patterns (Gait)  gait speed decreased;stride length decreased;base of support,  wide  -BP     Bilateral Gait Deviations  forward flexed posture  -BP     Comment (Gait/Stairs)  Transitioned from quad cane to straight point cane. Patient unable to safely manage quad cane despite repeated verbal and visual cues. Patient demonstrates improved safety with use straight point cane.   -BP     Row Name 11/15/19 0935          General ROM    GENERAL ROM COMMENTS  B LE AROM WFL  -BP     Row Name 11/15/19 0935          MMT (Manual Muscle Testing)    General MMT Comments  B LE gross MMT 4+/5   -BP     Row Name 11/15/19 0935          Sensory Assessment/Intervention    Sensory General Assessment  -- Intermittent tingling/numbness-pt is diabetic   -BP     Row Name 11/15/19 0935          Pain Assessment    Additional Documentation  Pain Scale: Numbers Pre/Post-Treatment (Group)  -BP     Row Name 11/15/19 0935          Pain Scale: Numbers Pre/Post-Treatment    Pain Scale: Numbers, Pretreatment  8/10  -BP     Pain Scale: Numbers, Post-Treatment  8/10  -BP     Pain Location - Side  Right  -BP     Pain Location - Orientation  incisional  -BP     Pain Location  shoulder  -BP     Pain Intervention(s)  Medication (See MAR);Repositioned  -BP     Row Name 11/15/19 0935          Plan of Care Review    Plan of Care Reviewed With  patient  -BP     Row Name 11/15/19 0935          Physical Therapy Clinical Impression    Date of Referral to PT  11/14/19  -BP     PT Diagnosis (PT Clinical Impression)  Decreased functional mobility   -BP     Criteria for Skilled Interventions Met (PT Clinical Impression)  yes;treatment indicated  -BP     Rehab Potential (PT Clinical Summary)  good, to achieve stated therapy goals  -BP     Predicted Duration of Therapy (PT)  1 week   -BP     Care Plan Review (PT)  evaluation/treatment results reviewed;care plan/treatment goals reviewed;risks/benefits reviewed  -BP     Patient/Family Concerns, Anticipated Discharge Disposition (PT)  Due to NWB status of R UE, patient will continue to require some  assistance upon return home. Anticipate need for home health PT at discharge.   -BP     Row Name 11/15/19 0935          Physical Therapy Goals    Bed Mobility Goal Selection (PT)  bed mobility, PT goal 1  -BP     Transfer Goal Selection (PT)  transfer, PT goal 1  -BP     Gait Training Goal Selection (PT)  gait training, PT goal 1  -BP     Stairs Goal Selection (PT)  stairs, PT goal 1  -BP     Additional Documentation  Stairs Goal Selection (PT) (Row)  -BP     Row Name 11/15/19 0935          Bed Mobility Goal 1 (PT)    Activity/Assistive Device (Bed Mobility Goal 1, PT)  bed mobility activities, all  -BP     [place] Level/Cues Needed (Bed Mobility Goal 1, PT)  supervision required  -BP     Time Frame (Bed Mobility Goal 1, PT)  1 week  -BP     Progress/Outcomes (Bed Mobility Goal 1, PT)  goal ongoing  -BP     Row Name 11/15/19 0935          Transfer Goal 1 (PT)    Activity/Assistive Device (Transfer Goal 1, PT)  sit-to-stand/stand-to-sit with appropriate assistivce device   -BP     [place] Level/Cues Needed (Transfer Goal 1, PT)  supervision required  -BP     Time Frame (Transfer Goal 1, PT)  1 week  -BP     Progress/Outcome (Transfer Goal 1, PT)  goal ongoing  -BP     Row Name 11/15/19 0935          Gait Training Goal 1 (PT)    Activity/Assistive Device (Gait Training Goal 1, PT)  gait (walking locomotion) with appropriate assistive device   -BP     [place] Level (Gait Training Goal 1, PT)  supervision required  -BP     Distance (Gait Goal 1, PT)  100  -BP     Time Frame (Gait Training Goal 1, PT)  1 week  -BP     Progress/Outcome (Gait Training Goal 1, PT)  goal ongoing  -BP     Row Name 11/15/19 0935          Stairs Goal 1 (PT)    Activity/Assistive Device (Stairs Goal 1, PT)  ascending stairs;descending stairs with appropriate assistive device   -BP     [place] Level/Cues Needed (Stairs Goal 1, PT)  contact guard assist  -BP     Number of Stairs (Stairs Goal 1, PT)  12 with one hanrdrail   -BP      Time Frame (Stairs Goal 1, PT)  1 week  -BP     Progress/Outcome (Stairs Goal 1, PT)  goal ongoing  -BP     Row Name 11/15/19 0935          Positioning and Restraints    Pre-Treatment Position  in bed  -BP     Post Treatment Position  chair  -BP     In Chair  reclined;call light within reach;encouraged to call for assist  -BP       User Key  (r) = Recorded By, (t) = Taken By, (c) = Cosigned By    Initials Name Provider Type    BP Arron Sarmiento, PT Physical Therapist        Physical Therapy Education     Title: PT OT SLP Therapies (Done)     Topic: Physical Therapy (Done)     Point: Mobility training (Done)     Learning Progress Summary           Patient Acceptance, E,TB, VU by BP at 11/15/2019 11:52 AM                   Point: Precautions (Done)     Learning Progress Summary           Patient Acceptance, E,TB, VU by BP at 11/15/2019 11:52 AM                               User Key     Initials Effective Dates Name Provider Type Discipline    BP 04/03/18 -  Arron Sarmiento, PT Physical Therapist PT              PT Recommendation and Plan  Anticipated Discharge Disposition (PT): home with home health, home with assist  Planned Therapy Interventions (PT Eval): bed mobility training, gait training, patient/family education, stair training, strengthening, transfer training  Therapy Frequency (PT Clinical Impression): 5 times/wk  Outcome Summary/Treatment Plan (PT)  Anticipated Equipment Needs at Discharge (PT): (will continue to assess )  Anticipated Discharge Disposition (PT): home with home health, home with assist  Patient/Family Concerns, Anticipated Discharge Disposition (PT): Due to NWB status of R UE, patient will continue to require some assistance upon return home. Anticipate need for home health PT at discharge.   Plan of Care Reviewed With: patient  Outcome Summary: PT Evaluation Complete: Patient performs supine to sit transfer with min A, sit to/from stand transfers with CGA and gait x 108 feet with  CGA with use of quad cane and straight cane. Gait initiated with quad cane however patient unable to safely manage device despite repeated cues. Transitioned to straight cane, patient with improved safety with use of straight point cane. Patient would benefit from skilled PT services to address deficits in functional mobility. Plan to see patient daily 5x/week x 1 week. Anticipate discharge home with home health and assist from daughter as needed.      Time Calculation:   PT Charges     Row Name 11/15/19 1155             Time Calculation    Start Time  0935  -BP      Stop Time  1009  -BP      Time Calculation (min)  34 min  -BP      PT Non-Billable Time (min)  15 min eval minutes   -BP      PT Received On  11/15/19  -BP      PT - Next Appointment  11/18/19  -BP         Time Calculation- PT    TCU Minutes- PT  19 min co treat with OT   -BP        User Key  (r) = Recorded By, (t) = Taken By, (c) = Cosigned By    Initials Name Provider Type    BP Arron Sarmiento, PT Physical Therapist        Therapy Charges for Today     Code Description Service Date Service Provider Modifiers Qty    39871260650 HC PT EVAL LOW COMPLEXITY 1 11/15/2019 Arron Sarmiento, PT GP 1    19617687170 HC PT THER PROC EA 15 MIN 11/15/2019 Arron Sarmiento, PT GP 1                 Arron Sarmiento PT  11/15/2019

## 2019-11-16 NOTE — PLAN OF CARE
Problem: Patient Care Overview  Goal: Plan of Care Review  Outcome: Ongoing (interventions implemented as appropriate)   11/16/19 0427   Coping/Psychosocial   Plan of Care Reviewed With patient   Plan of Care Review   Progress no change       Problem: Fall Risk (Adult)  Goal: Absence of Fall  Outcome: Ongoing (interventions implemented as appropriate)      Problem: Skin Injury Risk (Adult)  Goal: Skin Health and Integrity  Outcome: Ongoing (interventions implemented as appropriate)

## 2019-11-16 NOTE — PLAN OF CARE
Problem: Patient Care Overview  Goal: Plan of Care Review  Outcome: Ongoing (interventions implemented as appropriate)   11/16/19 0946   Coping/Psychosocial   Plan of Care Reviewed With patient   Plan of Care Review   Progress no change       Problem: Fall Risk (Adult)  Goal: Absence of Fall  Outcome: Ongoing (interventions implemented as appropriate)   11/16/19 0946   Fall Risk (Adult)   Absence of Fall making progress toward outcome       Problem: Skin Injury Risk (Adult)  Goal: Identify Related Risk Factors and Signs and Symptoms  Outcome: Ongoing (interventions implemented as appropriate)   11/16/19 0946   Skin Injury Risk (Adult)   Related Risk Factors (Skin Injury Risk) mobility impaired     Goal: Skin Health and Integrity  Outcome: Ongoing (interventions implemented as appropriate)   11/16/19 0946   Skin Injury Risk (Adult)   Skin Health and Integrity making progress toward outcome

## 2019-11-17 NOTE — PLAN OF CARE
Problem: Fall Risk (Adult)  Goal: Absence of Fall  Outcome: Ongoing (interventions implemented as appropriate)   11/16/19 0740   Fall Risk (Adult)   Absence of Fall achieves outcome

## 2019-11-18 NOTE — PLAN OF CARE
Problem: Patient Care Overview  Goal: Plan of Care Review   11/18/19 1105   Coping/Psychosocial   Plan of Care Reviewed With patient   OTHER   Outcome Summary PT tx: Pt worked on ambulation and safety with use of st cane. Pt educated on position of right shoulder in sling and precautions.    Plan of Care Review   Progress improving

## 2019-11-18 NOTE — THERAPY TREATMENT NOTE
SNF - Occupational Therapy Treatment Note  ALYSE Luna     Patient Name: Siobhan Dueñas  : 1935  MRN: [id number]  Today's Date: 2019  Onset of Illness/Injury or Date of Surgery: 19  Date of Referral to OT: 19  Referring Physician: Emmanuel    Admit Date: 2019     No diagnosis found.  Patient Active Problem List   Diagnosis   • Mixed hyperlipidemia   • Renovascular hypertension   • Left bundle branch block (LBBB)   • Multiple pulmonary nodules   • Cerebrovascular accident (CMS/HCC)   • Type 2 diabetes mellitus without complication (CMS/HCC)   • Cobalamin deficiency   • CKD (chronic kidney disease) stage 1, GFR 90 ml/min or greater   • Renal cell cancer (CMS/HCC)   • Anxiety   • Vitamin D deficiency   • Medication monitoring encounter   • Chronic systolic heart failure (CMS/HCC)   • Left ventricular dysfunction   • Mitral regurgitation   • Osteoarthritis   • Routine adult health maintenance   • DDD (degenerative disc disease), thoracic   • Cardiomyopathy (CMS/HCC)   • Statin myopathy   • Trimalleolar fracture of ankle, closed, right, with routine healing, subsequent encounter   • Hospital discharge follow-up   • Monoclonal gammopathy   • Memory deficit   • Supraspinatus tendon rupture, right, sequela   • Hyperuricemia   • TIA (transient ischemic attack)   • Bilateral sacral insufficiency fracture   • Chronic right shoulder pain   • Lumbar transverse process fracture, closed, initial encounter (CMS/[facility])   • Right rotator cuff tear arthropathy   • Spinal stenosis of lumbar region   • Age-related osteoporosis with current pathological fracture with routine healing   • Chronic UTI   • Uncontrolled hypertension   • Encounter for rehabilitation     Past Medical History:   Diagnosis Date   • Acute kidney failure (CMS/HCC)    • Anemia    • Anxiety    • Arthritis    • Asthma    • Cancer (CMS/HCC) 2004    Left kidney tumor   • Cataract    • CHF (congestive heart failure) (CMS/HCC)    • Chronic  kidney disease    • COPD (chronic obstructive pulmonary disease) (CMS/HCC)    • Depression    • Diabetes mellitus (CMS/HCC)     Type 2   • GERD (gastroesophageal reflux disease)    • H/O Chronic systolic heart failure 02/2018   • H/O Gout    • H/O LV dysfunction    • H/O NICM (nonischemic cardiomyopathy) 2017   • H/O PONV (postoperative nausea and vomiting)    • H/O Syncope and collapse 2015   • H/O Valvular disease 2015    Mitral/Tricuspid   • Hyperlipidemia    • Hypertension    • Hyponatremia 02/2018   • Infectious viral hepatitis    • LBBB (left bundle branch block)    • Liver disease    • Myeloma (CMS/HCC)    • Osteoporosis    • Pacemaker    • Shortness of breath    • Stroke (CMS/HCC)     Memory loss   • TIA (transient ischemic attack)      Past Surgical History:   Procedure Laterality Date   • CARDIAC CATHETERIZATION      mild dz, no stents   • CATARACT EXTRACTION     • CHOLECYSTECTOMY     • COLONOSCOPY  12/2005    Polyps x4   • EYE SURGERY      Implants   • FOOT SURGERY Right 02/2018    ORIF   • FRACTURE SURGERY     • HEMORRHOIDECTOMY     • JOINT REPLACEMENT Right 2011   • KIDNEY SURGERY     • NEPHRECTOMY Left 11/2004   • ORIF ANKLE FRACTURE Right 02/2018    Trimalleolar; delayed healing   • PACEMAKER IMPLANTATION  05/29/2015    Medtronic dual chamber/Dr. Weber       Therapy Treatment    Rehabilitation Treatment Summary     Row Name 11/18/19 1011          Treatment Time/Intention    Discipline  occupational therapist  -SD     Document Type  therapy note (daily note)  -SD     Subjective Information  complains of;pain;fatigue Pt c/o fatigue from poor sleep the past few nights  -SD     Mode of Treatment  occupational therapy  -SD     Patient/Family Observations  Pt reclined in a chair. Pt agreeable to therapy.  -SD     Care Plan Review  care plan/treatment goals reviewed;risks/benefits reviewed;current/potential barriers reviewed;patient/other agree to care plan  -SD     Therapy Frequency (PT Clinical  Impression)  --     Patient Effort  --     Existing Precautions/Restrictions  fall;non-weight bearing NWB of RUE, wear shoulder brace  -SD     Treatment Considerations/Comments  Education provided regarding rom/activity restrictions following Reverse TSA, rom program of non-involved joints of RUE and compensatory strategies for adl activity.  -SD     Recorded by [SD] Jose Mabry, OTR 11/18/19 1418        Row Name 11/18/19 1011          Mobility Assessment/Intervention    Extremity Weight-bearing Status  right upper extremity  -SD     Right Upper Extremity (Weight-bearing Status)  non weight-bearing (NWB)  -SD     Recorded by [SD] Jose Mabry, OTR 11/18/19 1418        Row Name 11/18/19 1011             Functional Mobility    Functional Mobility- Ind. Level  standby assist  -SD      Functional Mobility- Device  straight cane  -SD      Functional Mobility-Distance (Feet)  20 in room mobility  -SD      Recorded by [SD] Jose Mabry OTR 11/18/19 1418      Row Name 11/18/19 1011          Sit-Stand Transfer    Sit-Stand [place] (Transfers)  stand by assist  -SD     Assistive Device (Sit-Stand Transfers)  cane, straight  -SD     Recorded by [SD] Jose Mabry OTR 11/18/19 1418     Row Name 11/18/19 1011          Stand-Sit Transfer    Stand-Sit [place] (Transfers)  stand by assist  -SD     Assistive Device (Stand-Sit Transfers)  cane, straight  -SD     Recorded by [SD] Jose Mabry OTR 11/18/19 1418        Row Name 11/18/19 1011             Bathing Assessment/Intervention    Bathing [place] Level  bathing skills;lower body;minimum assist (75% patient effort)  -SD      Bathing Position  supported sitting  -SD      Comment (Bathing)  Pt required min assist for bathing activity due to rom/activity restrictions of RUE. Pt can benefit from a LH Sponge  -SD      Recorded by [SD] Jose Mabry OTR 11/18/19 1418      Row Name 11/18/19 1011             Upper Body Dressing  Assessment/Training    Upper Body Dressing [place] Level  upper body dressing skills;doff;front opening garment;independent;don;minimum assist (75% patient effort)  -SD      Upper Body Dressing Position  supported sitting  -SD      Comment (Upper Body Dressing)  pt doffed shoulder brace/hospital gown independently. min assist to don brace/gown after bathing activity.  -SD      Recorded by [SD] Jose Mabry, OTR 11/18/19 1418      Row Name 11/18/19 1011             BADL Safety/Performance    Impairments, BADL Safety/Performance  range of motion Pt has rom/activity restrictions following sx  -SD      Recorded by [SD] Jose Mabry OTR 11/18/19 1418      Row Name 11/18/19 1011          Positioning and Restraints    Pre-Treatment Position  sitting in chair/recliner  -SD     Post Treatment Position  chair  -SD     In Chair  reclined;call light within reach;encouraged to call for assist pt wearing RUE brace, RUE supported with pillow  -SD     Recorded by [SD] Jose Mabry OTR 11/18/19 1418     Row Name 11/18/19 1011          Pain Scale: Numbers Pre/Post-Treatment    Pain Scale: Numbers, Pretreatment  8/10  -SD     Pain Scale: Numbers, Post-Treatment  8/10  -SD     Pain Location - Side  Right  -SD     Pain Location - Orientation  --     Pain Location  shoulder  -SD     Pain Intervention(s)  Repositioned;Ambulation/increased activity  -SD     Recorded by [SD] Jose Mabry OTR 11/18/19 1418           Row Name 11/18/19 1011          Plan of Care Review    Plan of Care Reviewed With  patient  -SD     Recorded by [SD] Jose Mabry OTR 11/18/19 1418     Row Name 11/18/19 1011             Outcome Summary/Treatment Plan (OT)    Daily Summary of Progress (OT)  progress toward functional goals as expected  -SD      Plan for Continued Treatment (OT)  continue with plan.  -SD      Recorded by [SD] Jose Mabry, GHISLAINER 11/18/19 1418           User Key  (r) = Recorded By, (t) = Taken By, (c) = Cosigned  By    Initials Name Effective Dates Discipline    Jose Nicholas, OTR 06/22/16 -  OT    KB Latricia Sharpe, LPN 10/07/16 -  Nurse    Yesenia Fried, SHALA 06/20/18 -  PT        Wound 11/15/19 1322 Right shoulder Incision (Active)   Dressing Appearance dry;intact 11/18/2019  2:12 PM   Closure SULEMAN 11/18/2019  2:12 PM   Base dressing in place, unable to visualize 11/18/2019  2:12 PM   Drainage Amount none 11/18/2019  2:12 PM       Occupational Therapy Education     Title: PT OT SLP Therapies (In Progress)     Topic: Occupational Therapy (In Progress)     Point: ADL training (Done)     Description: Instruct learner(s) on proper safety adaptation and remediation techniques during self care or transfers.   Instruct in proper use of assistive devices.    Learning Progress Summary           Patient Acceptance, E,TB,D, VU,DU by SD at 11/18/2019  2:02 PM    Comment:  Education regarding compensatory strategies to manage adl activity with surgical restrictions following right reverse TSA. Pt needs min assist for adl activity due to rom/activity restrictions following shoulder surgery.    Acceptance, E,TB, VU by DAVIDSON at 11/15/2019  1:14 PM    Comment:  pt educated on safety with functional transfers, adls, and AROM of uninvolved joints                               User Key     Initials Effective Dates Name Provider Type Discipline    SD 06/22/16 -  Jose Mabry, OTR Occupational Therapist OT    DAVIDSON 06/22/16 -  Mary Cade OTR Occupational Therapist OT                OT Recommendation and Plan  Outcome Summary/Treatment Plan (OT)  Daily Summary of Progress (OT): progress toward functional goals as expected  Plan for Continued Treatment (OT): continue with plan.  Daily Summary of Progress (OT): progress toward functional goals as expected  Plan of Care Review  Plan of Care Reviewed With: patient  Plan of Care Reviewed With: patient  Outcome Summary: OT:  Pt independent with rom program of non-involved joints of  [place]. Pt was able to doff her brace and upper body clothing independently. She requires min assist for bathing/dressing activity due to rom/activity restrictions of RUE following her shoulder surgery. Pt states she will have support at home for adl tasks. Anticpate pt to be discharged home this week with family support.       Time Calculation:   Time Calculation- OT     Row Name 11/18/19 1402          Time Calculation- OT    OT Start Time  1011  -SD     OT Stop Time  1045  -SD     OT Time Calculation (min)  34 min  -SD        Timed Charges    59913 - Gait Training Minutes   --     56487 - OT Self Care/Mgmt Minutes  34  -SD       User Key  (r) = Recorded By, (t) = Taken By, (c) = Cosigned By    Initials Name Provider Type    Jose Nicholas OTR Occupational Therapist        Therapy Charges for Today     Code Description Service Date Service Provider Modifiers Qty    51009056866 HC OT SELF CARE/MGMT/TRAIN EA 15 MIN 11/18/2019 Jose Mabry OTR GO 2               CARMEN Monahan  11/18/2019

## 2019-11-18 NOTE — PLAN OF CARE
Problem: Patient Care Overview  Goal: Plan of Care Review   11/18/19 2241   OTHER   Outcome Summary OT: Pt independent with rom program of non-involved joints of RUE. Pt was able to doff her brace and upper body clothing independently. She requires min assist for bathing/dressing activity due to rom/activity restrictions of RUE following her shoulder surgery. Pt states she will have support at home for adl tasks. Anticpate pt to be discharged home this week with family support.

## 2019-11-18 NOTE — THERAPY TREATMENT NOTE
SNF - Physical Therapy Treatment Note  ALYSE Luna     Patient Name: Siobhan Dueñas  : 1935  MRN: [id number]  Today's Date: 2019  Onset of Illness/Injury or Date of Surgery: 19  Date of Referral to PT: 19  Referring Physician: Emmanuel    Admit Date: 2019    Visit Dx:  No diagnosis found.  Patient Active Problem List   Diagnosis   • Mixed hyperlipidemia   • Renovascular hypertension   • Left bundle branch block (LBBB)   • Multiple pulmonary nodules   • Cerebrovascular accident (CMS/HCC)   • Type 2 diabetes mellitus without complication (CMS/HCC)   • Cobalamin deficiency   • CKD (chronic kidney disease) stage 1, GFR 90 ml/min or greater   • Renal cell cancer (CMS/HCC)   • Anxiety   • Vitamin D deficiency   • Medication monitoring encounter   • Chronic systolic heart failure (CMS/HCC)   • Left ventricular dysfunction   • Mitral regurgitation   • Osteoarthritis   • Routine adult health maintenance   • DDD (degenerative disc disease), thoracic   • Cardiomyopathy (CMS/HCC)   • Statin myopathy   • Trimalleolar fracture of ankle, closed, right, with routine healing, subsequent encounter   • Hospital discharge follow-up   • Monoclonal gammopathy   • Memory deficit   • Supraspinatus tendon rupture, right, sequela   • Hyperuricemia   • TIA (transient ischemic attack)   • Bilateral sacral insufficiency fracture   • Chronic right shoulder pain   • Lumbar transverse process fracture, closed, initial encounter (CMS/[facility])   • Right rotator cuff tear arthropathy   • Spinal stenosis of lumbar region   • Age-related osteoporosis with current pathological fracture with routine healing   • Chronic UTI   • Uncontrolled hypertension   • Encounter for rehabilitation       Therapy Treatment    Rehabilitation Treatment Summary     Row Name 19 1046             Treatment Time/Intention    Discipline  physical therapist  -SAVI      Document Type  therapy note (daily note)  -SAVI      Subjective Information   complains of;fatigue reports she has not slept well for last 2 nights  -JW      Mode of Treatment  physical therapy  -JW      Patient/Family Observations  pt up in chair and reports she just finished OT  -JW      Care Plan Review  care plan/treatment goals reviewed  -JW      Therapy Frequency (PT Clinical Impression)  5 times/wk  -JW      Patient Effort  adequate  -JW      Existing Precautions/Restrictions  fall;non-weight bearing  -JW      Recorded by [JW] Yesenia Munguia, PT 11/18/19 1105      Row Name 11/18/19 1046             Cognitive Assessment/Intervention- PT/OT    Orientation Status (Cognition)  oriented x 4  -JW      Follows Commands (Cognition)  WNL  -JW      Personal Safety Interventions  gait belt;nonskid shoes/slippers when out of bed  -JW      Recorded by [JW] Yesenia Munguia, PT 11/18/19 1105      Row Name 11/18/19 1046             Mobility Assessment/Intervention    Extremity Weight-bearing Status  right upper extremity  -JW      Right Upper Extremity (Weight-bearing Status)  non weight-bearing (NWB)  -JW      Recorded by [JW] Yesenia Munguia, PT 11/18/19 1105      Row Name 11/18/19 1046             Bed Mobility Assessment/Treatment    Comment (Bed Mobility)  deferred pt up in chair  -JW      Recorded by [JW] Yesenia Munguia, PT 11/18/19 1105      Row Name 11/18/19 1046             Sit-Stand Transfer    Sit-Stand [place] (Transfers)  contact guard;verbal cues  -JW      Assistive Device (Sit-Stand Transfers)  cane, straight  -JW      Recorded by [JW] Yesenia Munguia, PT 11/18/19 1105      Row Name 11/18/19 1046             Stand-Sit Transfer    Stand-Sit [place] (Transfers)  contact guard;verbal cues  -JW      Assistive Device (Stand-Sit Transfers)  cane, straight  -JW      Recorded by [JW] Yesenia Munguia, PT 11/18/19 1105      Row Name 11/18/19 1046             Gait/Stairs Assessment/Training    Gait/Stairs Assessment/Training  gait/ambulation assistive device  -JW      [place] Level (Gait)  contact  guard;verbal cues  -JW      Assistive Device (Gait)  cane, straight  -JW      Distance in Feet (Gait)  60  -JW      Pattern (Gait)  step-through  -JW      Comment (Gait/Stairs)  pt reports faitgue and wanted to return to her chair to sit and rest after 60 ft.   -JW      Recorded by [JW] Yesenia Munguia, PT 11/18/19 1105      Row Name 11/18/19 1046             Positioning and Restraints    Pre-Treatment Position  sitting in chair/recliner  -JW      Post Treatment Position  chair  -JW      In Chair  reclined;sitting;call light within reach;encouraged to call for assist;RUE elevated  -JW      Recorded by [JW] Yesenia Munguia, PT 11/18/19 1105      Row Name 11/18/19 1046             Pain Scale: Numbers Pre/Post-Treatment    Pain Scale: Numbers, Pretreatment  8/10  -JW      Pain Scale: Numbers, Post-Treatment  8/10  -JW      Pain Location - Side  Right  -JW      Pain Location - Orientation  incisional  -JW      Pain Location  shoulder  -JW      Pain Intervention(s)  Repositioned adjusted her shoulder brace/sling  -JW      Recorded by [JW] Yesenia Munguia, PT 11/18/19 1105      Row Name                Wound 11/15/19 1322 Right shoulder Incision    Wound - Properties Group Date first assessed: 11/15/19 [KB] Time first assessed: 1322 [KB] Side: Right [KB] Location: shoulder [KB] Primary Wound Type: Incision [KB] Recorded by:  [KB] Latricia Sharpe LPN 11/15/19 1322    Row Name 11/18/19 1046             Coping    Observed Emotional State  accepting  -JW      Verbalized Emotional State  acceptance  -JW      Recorded by [JW] Yesenia Munguia, PT 11/18/19 1105      Row Name 11/18/19 1046             Plan of Care Review    Plan of Care Reviewed With  patient  -JW      Recorded by [JW] Yesenia Munguia, PT 11/18/19 1105      Row Name 11/18/19 1046             Outcome Summary/Treatment Plan (PT)    Daily Summary of Progress (PT)  progress toward functional goals is good  -JW      Anticipated Discharge Disposition (PT)  home with home health;home with  assist  -SAVI      Patient/Family Concerns, Anticipated Discharge Disposition (PT)  NWB RUE  -JW      Recorded by [JW] Yesenia Munguia, PT 11/18/19 1105        User Key  (r) = Recorded By, (t) = Taken By, (c) = Cosigned By    Initials Name Effective Dates Discipline     Latricia Sharpe, LPN 10/07/16 -  Nurse    Yesenia Fried, PT 06/20/18 -  PT          Wound 11/15/19 1322 Right shoulder Incision (Active)   Dressing Appearance dry;intact 11/17/2019  8:11 PM   Closure SULEMAN 11/17/2019  8:11 PM   Base dressing in place, unable to visualize 11/17/2019  8:11 PM   Drainage Amount none 11/17/2019  8:11 PM           Physical Therapy Education     Title: PT OT SLP Therapies (In Progress)     Topic: Physical Therapy (Done)     Point: Mobility training (Done)     Learning Progress Summary           Patient Acceptance, E,TB, VU by  at 11/18/2019 11:05 AM    Acceptance, E,TB, VU by  at 11/15/2019 11:52 AM                   Point: Precautions (Done)     Learning Progress Summary           Patient Acceptance, E,TB, VU by  at 11/18/2019 11:05 AM    Acceptance, E,TB, VU by  at 11/15/2019 11:52 AM                               User Key     Initials Effective Dates Name Provider Type Discipline     04/03/18 -  Arron Sarmiento, PT Physical Therapist PT     06/20/18 -  Yesenia Munguia, PT Physical Therapist PT                PT Recommendation and Plan  Anticipated Discharge Disposition (PT): home with home health, home with assist  Therapy Frequency (PT Clinical Impression): 5 times/wk  Outcome Summary/Treatment Plan (PT)  Daily Summary of Progress (PT): progress toward functional goals is good  Anticipated Discharge Disposition (PT): home with home health, home with assist  Patient/Family Concerns, Anticipated Discharge Disposition (PT): [facility]  Plan of Care Reviewed With: patient  Progress: improving  Outcome Summary: PT tx: Pt worked on ambulation and safety with use of st cane. Pt educated on position of right shoulder in  sling and precautions.      Time Calculation:   PT Charges     Row Name 11/18/19 1106             Time Calculation    Start Time  1045  -JW      Stop Time  1100  -JW      Time Calculation (min)  15 min  -JW         Time Calculation- PT    TCU Minutes- PT  15 min  -JW         Timed Charges    33021 - Gait Training Minutes   15  -JW        User Key  (r) = Recorded By, (t) = Taken By, (c) = Cosigned By    Initials Name Provider Type     Yesenia Munguia, PT Physical Therapist        Therapy Charges for Today     Code Description Service Date Service Provider Modifiers Qty    67958022949 HC GAIT TRAINING EA 15 MIN 11/18/2019 Yesenia Munguia, PT GP 1               Yesenia Trammell, PT  11/18/2019

## 2019-11-18 NOTE — PROGRESS NOTES
"Adult Nutrition  Assessment/PES    Patient Name:  Siobhan Dueñas  YOB: 1935  MRN: [id number]  Admit Date:  11/14/2019    Assessment Date:  11/18/2019    Comments:  Encourage cont good po intake. Declines edu needs.   Daughter reports was told to eat high Na diet due to low Na at [place].       Reason for Assessment     Row Name 11/18/19 1450          Reason for Assessment    Reason For Assessment  per organizational policy admission assessment      Diagnosis  other (see comments) s/p rotator cuff surgery hx CHF DM solitary kidney         Nutrition/Diet History     Row Name 11/18/19 1451          Nutrition/Diet History    Typical Food/Fluid Intake  Spoke w pt & daughter at bedside. Reports Na 115 at [place] was able to get it up to Na 136 . Was told by nephrology to eat salt rich diet b/c hx of low Na.          Anthropometrics     Row Name 11/18/19 1453          Anthropometrics    Height Method  measured     Height  162.6 cm (64\") armspan     Weight  -- 88.2 kg         Ideal Body Weight (IBW)    Ideal Body Weight (IBW) (kg)  55        Usual Body Weight (UBW)    Weight Loss Time Frame  denies wt loss        Body Mass Index (BMI)    BMI Assessment  BMI 35-39.9: obesity grade II         Labs/Tests/Procedures/Meds     Row Name 11/18/19 1456          Labs/Procedures/Meds    Lab Results Reviewed  reviewed        Diagnostic Tests/Procedures    Diagnostic Test/Procedure Reviewed  reviewed        Medications    Pertinent Medications Reviewed  reviewed     Pertinent Medications Comments  risaquad           Estimated/Assessed Needs     Row Name 11/18/19 1457 11/18/19 1454       Calculation Measurements    Height  --  162.6 cm (64\") armspan       Estimated/Assessed Needs    Additional Documentation  Calorie Requirements (Group);Protein Requirements (Group);Fluid Requirements (Group)  --       Calorie Requirements    Estimated Calorie Need Method  [facility]  --    Estimated Calorie Requirement Comment  " 1535 kcal ( mifflin 1.2 )  173 gm CHO, 45% kcal   --       Protein Requirements    Est Protein Requirement Amount (gms/kg)  0.8 gm protein 71 gm pro   --       Fluid Requirements    Estimated Fluid Requirement Method  RDA Method 1535 ml  --        Nutrition Prescription Ordered     Row Name 11/18/19 1458          Nutrition Prescription PO    Common Modifiers  Cardiac;Consistent Carbohydrate         Evaluation of Received Nutrient/Fluid Intake     Row Name 11/18/19 1458          Fluid Intake Evaluation    Oral Fluid (mL)  [address] ave x 3, 50%        PO Evaluation    Number of Meals  10     % PO Intake  68               Problem/Interventions:  Problem 1     Row Name 11/18/19 1459          Nutrition Diagnoses Problem 1    Problem 1  Nutrition Appropriate for Condition at this Time               Intervention Goal     Row Name 11/18/19 1501          Intervention Goal    General  Meet nutritional needs for age/condition     PO  PO intake (%)     PO Intake %  65 % or greater     Weight  No significant weight loss         Nutrition Intervention     Row Name 11/18/19 1501          Nutrition Intervention    RD/Tech Action  Interview for preference;Follow Tx progress;Adjusted portion           Education/Evaluation     Row Name 11/18/19 1502          Education    Education  Education offered and refused        Monitor/Evaluation    Monitor  Per protocol;I&O;PO intake;Pertinent labs;Weight;Symptoms           Electronically signed by:  Breana Warren RD  11/18/19 3:03 PM

## 2019-11-19 NOTE — PLAN OF CARE
Problem: Patient Care Overview  Goal: Plan of Care Review   11/19/19 1143   Coping/Psychosocial   Plan of Care Reviewed With patient   OTHER   Outcome Summary PT: Patient performs sit to/from stand transfers with SBA and gait x 208 feet with one seated rest break with use of quad cane with SBA/CGA. She ascends/descends 10 stairs with one handrail with CGA. Plan to continue PT daily, will discharge patient on 11/22 with recommendation of home health PT.

## 2019-11-19 NOTE — THERAPY TREATMENT NOTE
SNF - Physical Therapy Treatment Note  ALYSE Luna     Patient Name: Siobhan Dueñas  : 1935  MRN: [id number]  Today's Date: 2019  Onset of Illness/Injury or Date of Surgery: 19  Date of Referral to PT: 19  Referring Physician: Emmanuel    Admit Date: 2019    Visit Dx:  No diagnosis found.  Patient Active Problem List   Diagnosis   • Mixed hyperlipidemia   • Renovascular hypertension   • Left bundle branch block (LBBB)   • Multiple pulmonary nodules   • Cerebrovascular accident (CMS/HCC)   • Type 2 diabetes mellitus without complication (CMS/HCC)   • Cobalamin deficiency   • CKD (chronic kidney disease) stage 1, GFR 90 ml/min or greater   • Renal cell cancer (CMS/HCC)   • Anxiety   • Vitamin D deficiency   • Medication monitoring encounter   • Chronic systolic heart failure (CMS/HCC)   • Left ventricular dysfunction   • Mitral regurgitation   • Osteoarthritis   • Routine adult health maintenance   • DDD (degenerative disc disease), thoracic   • Cardiomyopathy (CMS/HCC)   • Statin myopathy   • Trimalleolar fracture of ankle, closed, right, with routine healing, subsequent encounter   • Hospital discharge follow-up   • Monoclonal gammopathy   • Memory deficit   • Supraspinatus tendon rupture, right, sequela   • Hyperuricemia   • TIA (transient ischemic attack)   • Bilateral sacral insufficiency fracture   • Chronic right shoulder pain   • Lumbar transverse process fracture, closed, initial encounter (CMS/[facility])   • Right rotator cuff tear arthropathy   • Spinal stenosis of lumbar region   • Age-related osteoporosis with current pathological fracture with routine healing   • Chronic UTI   • Uncontrolled hypertension   • Encounter for rehabilitation       Therapy Treatment    Rehabilitation Treatment Summary     Row Name 19 0919             Treatment Time/Intention    Discipline  physical therapist  -BP      Document Type  therapy note (daily note)  -BP      Subjective Information   complains of;pain  -BP      Mode of Treatment  physical therapy  -BP      Patient/Family Observations  Patient reclined in bedside chair. Agreeable to PT   -BP      Care Plan Review  risks/benefits reviewed  -BP      Patient Effort  good  -BP      Existing Precautions/Restrictions  fall;non-weight bearing R UE   -BP      Recorded by [BP] Arron Sarmiento, PT 11/19/19 1143      Row Name 11/19/19 0919             Cognitive Assessment/Intervention- PT/OT    Personal Safety Interventions  gait belt;nonskid shoes/slippers when out of bed  -BP      Recorded by [BP] Arron Sarmiento, PT 11/19/19 1143      Row Name 11/19/19 0919             Bed Mobility Assessment/Treatment    Comment (Bed Mobility)  deferred, patient up in chair.   -BP      Recorded by [BP] Arron Sarmiento, PT 11/19/19 1143      Row Name 11/19/19 0919             Transfer Assessment/Treatment    Transfer Assessment/Treatment  sit-stand transfer;stand-sit transfer  -BP      Comment (Transfers)  Patient demonstrates proper hand placement  -BP      Recorded by [BP] Arron Sarmiento, PT 11/19/19 1143      Row Name 11/19/19 0919             Sit-Stand Transfer    Sit-Stand [place] (Transfers)  stand by assist  -BP      Assistive Device (Sit-Stand Transfers)  cane, straight  -BP      Recorded by [BP] Arron Sarmiento, PT 11/19/19 1143      Row Name 11/19/19 0919             Stand-Sit Transfer    Stand-Sit [place] (Transfers)  stand by assist  -BP      Assistive Device (Stand-Sit Transfers)  cane, straight  -BP      Recorded by [BP] Arron Sarmiento, PT 11/19/19 1143      Row Name 11/19/19 0919             Gait/Stairs Assessment/Training    [place] Level (Gait)  stand by assist;contact guard;verbal cues  -BP      Assistive Device (Gait)  cane, straight  -BP      Distance in Feet (Gait)  104 seated break followed by an additional 104 ft  -BP      Pattern (Gait)  swing-through  -BP      Deviations/Abnormal Patterns (Gait)  gait speed  decreased;stride length decreased  -BP      Bilateral Gait Deviations  forward flexed posture  -BP      [place] Level (Stairs)  contact guard  -BP      Number of Steps (Stairs)  10 with one handrail   -BP      Comment (Gait/Stairs)  No loss of balance noted. Patient requires cues to avoid external obstacles. Patient ascends/descends 10 stairs with one handrail. Patient able to alternate handrails, she has 2 handrails at home.   -BP      Recorded by [BP] Arron Sarmiento, PT 11/19/19 1143      Row Name 11/19/19 0919             Positioning and Restraints    Pre-Treatment Position  sitting in chair/recliner  -BP      Post Treatment Position  chair  -BP      In Chair  notified nsg;reclined;call light within reach;encouraged to call for assist  -BP      Recorded by [BP] Arron Sarmiento, PT 11/19/19 1143      Row Name 11/19/19 0919             Pain Scale: Numbers Pre/Post-Treatment    Pain Scale: Numbers, Post-Treatment  9/10  -BP      Pain Location - Side  Right  -BP      Pain Location - Orientation  incisional  -BP      Pain Location  shoulder  -BP      Pain Intervention(s)  Repositioned  -BP      Recorded by [BP] Arron Sarmiento, PT 11/19/19 1143      Row Name                Wound 11/15/19 1322 Right shoulder Incision    Wound - Properties Group Date first assessed: 11/15/19 [KB] Time first assessed: 1322 [KB] Side: Right [KB] Location: shoulder [KB] Primary Wound Type: Incision [KB] Recorded by:  [KB] Latricia Sharpe LPN 11/15/19 1322    Row Name 11/19/19 0919             Plan of Care Review    Plan of Care Reviewed With  patient  -BP      Recorded by [BP] Arron Sarmiento, PT 11/19/19 1143      Row Name 11/19/19 0919             Outcome Summary/Treatment Plan (PT)    Daily Summary of Progress (PT)  progress toward functional goals is good  -BP      Plan for Continued Treatment (PT)  Plan to discharge patient from PT on 11/22   -BP      Anticipated Equipment Needs at Discharge (PT)  standard cane  -BP       Anticipated Discharge Disposition (PT)  home with home health;home with assist  -BP      Recorded by [BP] Torsten Arron, PT 11/19/19 1143        User Key  (r) = Recorded By, (t) = Taken By, (c) = Cosigned By    Initials Name Effective Dates Discipline     Torsten Arron, PT 04/03/18 -  PT    Latricia Mims, LPN 10/07/16 -  Nurse          Wound 11/15/19 1322 Right shoulder Incision (Active)   Dressing Appearance dry;intact 11/18/2019  8:36 PM   Closure SULEMAN 11/18/2019  8:36 PM   Base dressing in place, unable to visualize 11/18/2019  8:36 PM   Drainage Amount none 11/18/2019  8:36 PM           Physical Therapy Education     Title: PT OT SLP Therapies (In Progress)     Topic: Physical Therapy (Done)     Point: Mobility training (Done)     Learning Progress Summary           Patient Acceptance, E,TB, VU by  at 11/19/2019 11:43 AM    Acceptance, E,TB, VU by  at 11/18/2019 11:05 AM    Acceptance, E,TB, VU by  at 11/15/2019 11:52 AM                   Point: Precautions (Done)     Learning Progress Summary           Patient Acceptance, E,TB, VU by  at 11/19/2019 11:43 AM    Acceptance, E,TB, VU by  at 11/18/2019 11:05 AM    Acceptance, E,TB, VU by  at 11/15/2019 11:52 AM                               User Key     Initials Effective Dates Name Provider Type Discipline     04/03/18 -  Arron Sarmiento, PT Physical Therapist PT     06/20/18 -  Yesenia Munguia PT Physical Therapist PT                PT Recommendation and Plan  Anticipated Discharge Disposition (PT): home with home health, home with assist  Planned Therapy Interventions (PT Eval): bed mobility training, gait training, patient/family education, stair training, strengthening, transfer training  Therapy Frequency (PT Clinical Impression): 5 times/wk  Outcome Summary/Treatment Plan (PT)  Daily Summary of Progress (PT): progress toward functional goals is good  Plan for Continued Treatment (PT): Plan to discharge patient from PT on  11/22   Anticipated Equipment Needs at Discharge (PT): standard cane  Anticipated Discharge Disposition (PT): home with home health, home with assist  Patient/Family Concerns, Anticipated Discharge Disposition (PT): Due to NWB status of R UE, patient will continue to require some assistance upon return home. Anticipate need for home health PT at discharge.   Plan of Care Reviewed With: patient  Outcome Summary: PT: Patient performs sit to/from stand transfers with SBA and gait x 208 feet with one seated rest break with use of quad cane with SBA/CGA. She ascends/descends 10 stairs with one handrail with CGA. Plan to continue PT daily, will discharge patient on 11/22 with recommendation of home health PT.      Time Calculation:   PT Charges     Row Name 11/19/19 1145             Time Calculation    Start Time  0919  -BP      Stop Time  0936  -BP      Time Calculation (min)  17 min  -BP      PT Received On  11/19/19  -BP      PT - Next Appointment  11/20/19  -BP         Timed Charges    94661 - Gait Training Minutes   17  -BP        User Key  (r) = Recorded By, (t) = Taken By, (c) = Cosigned By    Initials Name Provider Type    BP Arron Sarmiento, PT Physical Therapist        Therapy Charges for Today     Code Description Service Date Service Provider Modifiers Qty    37999181611 HC GAIT TRAINING EA 15 MIN 11/19/2019 Arron Sarmiento, PT GP 1               Arron Sarmiento, PT  11/19/2019

## 2019-11-19 NOTE — PLAN OF CARE
Problem: Fall Risk (Adult)  Goal: Identify Related Risk Factors and Signs and Symptoms  Outcome: Ongoing (interventions implemented as appropriate)    Goal: Absence of Fall  Outcome: Ongoing (interventions implemented as appropriate)      Problem: Skin Injury Risk (Adult)  Goal: Identify Related Risk Factors and Signs and Symptoms  Outcome: Ongoing (interventions implemented as appropriate)

## 2019-11-19 NOTE — PLAN OF CARE
Problem: Patient Care Overview  Goal: Plan of Care Review   11/19/19 4422   OTHER   Outcome Summary OT: Education regarding compensatory strategies and use of LH AE for adl management due to limited use of RUE following shoulder surgery. Pt states she will have family support as needed upon discharge for basic self care tasks. Pt was able to manage transfers and in room mobility with SBA and was independent with bed mobility.

## 2019-11-19 NOTE — THERAPY TREATMENT NOTE
SNF - Occupational Therapy Treatment Note  ALYSE Luna     Patient Name: Siobhan Dueñas  : 1935  MRN: [id number]  Today's Date: 2019  Onset of Illness/Injury or Date of Surgery: 19  Date of Referral to OT: 19  Referring Physician: Emmanuel    Admit Date: 2019     No diagnosis found.  Patient Active Problem List   Diagnosis   • Mixed hyperlipidemia   • Renovascular hypertension   • Left bundle branch block (LBBB)   • Multiple pulmonary nodules   • Cerebrovascular accident (CMS/HCC)   • Type 2 diabetes mellitus without complication (CMS/HCC)   • Cobalamin deficiency   • CKD (chronic kidney disease) stage 1, GFR 90 ml/min or greater   • Renal cell cancer (CMS/HCC)   • Anxiety   • Vitamin D deficiency   • Medication monitoring encounter   • Chronic systolic heart failure (CMS/HCC)   • Left ventricular dysfunction   • Mitral regurgitation   • Osteoarthritis   • Routine adult health maintenance   • DDD (degenerative disc disease), thoracic   • Cardiomyopathy (CMS/HCC)   • Statin myopathy   • Trimalleolar fracture of ankle, closed, right, with routine healing, subsequent encounter   • Hospital discharge follow-up   • Monoclonal gammopathy   • Memory deficit   • Supraspinatus tendon rupture, right, sequela   • Hyperuricemia   • TIA (transient ischemic attack)   • Bilateral sacral insufficiency fracture   • Chronic right shoulder pain   • Lumbar transverse process fracture, closed, initial encounter (CMS/[facility])   • Right rotator cuff tear arthropathy   • Spinal stenosis of lumbar region   • Age-related osteoporosis with current pathological fracture with routine healing   • Chronic UTI   • Uncontrolled hypertension   • Encounter for rehabilitation     Past Medical History:   Diagnosis Date   • Acute kidney failure (CMS/HCC)    • Anemia    • Anxiety    • Arthritis    • Asthma    • Cancer (CMS/HCC) 2004    Left kidney tumor   • Cataract    • CHF (congestive heart failure) (CMS/HCC)    • Chronic  kidney disease    • COPD (chronic obstructive pulmonary disease) (CMS/HCC)    • Depression    • Diabetes mellitus (CMS/HCC)     Type 2   • GERD (gastroesophageal reflux disease)    • H/O Chronic systolic heart failure 02/2018   • H/O Gout    • H/O LV dysfunction    • H/O NICM (nonischemic cardiomyopathy) 2017   • H/O PONV (postoperative nausea and vomiting)    • H/O Syncope and collapse 2015   • H/O Valvular disease 2015    Mitral/Tricuspid   • Hyperlipidemia    • Hypertension    • Hyponatremia 02/2018   • Infectious viral hepatitis    • LBBB (left bundle branch block)    • Liver disease    • Myeloma (CMS/HCC)    • Osteoporosis    • Pacemaker    • Shortness of breath    • Stroke (CMS/HCC)     Memory loss   • TIA (transient ischemic attack)      Past Surgical History:   Procedure Laterality Date   • CARDIAC CATHETERIZATION      mild dz, no stents   • CATARACT EXTRACTION     • CHOLECYSTECTOMY     • COLONOSCOPY  12/2005    Polyps x4   • EYE SURGERY      Implants   • FOOT SURGERY Right 02/2018    ORIF   • FRACTURE SURGERY     • HEMORRHOIDECTOMY     • JOINT REPLACEMENT Right 2011   • KIDNEY SURGERY     • NEPHRECTOMY Left 11/2004   • ORIF ANKLE FRACTURE Right 02/2018    Trimalleolar; delayed healing   • PACEMAKER IMPLANTATION  05/29/2015    Medtronic dual chamber/Dr. Weber       Therapy Treatment    Rehabilitation Treatment Summary     Row Name 11/19/19 1010          Treatment Time/Intention    Discipline  occupational therapist  -SD     Document Type  therapy note (daily note)  -SD     Subjective Information  complains of;pain  -SD     Mode of Treatment  occupational therapy  -SD     Patient/Family Observations  Pt reclined in chair. Pt agreeable to therapy.  -SD     Care Plan Review  care plan/treatment goals reviewed;risks/benefits reviewed;current/potential barriers reviewed  -SD     Patient Effort  good  -SD     Existing Precautions/Restrictions  fall;non-weight bearing NWB of RUE  -SD     Treatment  Considerations/Comments  Education with compensatory strategies and use of LH AE for adl management.   -SD     Equipment Issued to Patient  bathing equipment LH sponge provided to pt  -SD     Recorded by [SD] Jose Mabry OTR 11/19/19 1302     Row Name 11/19/19 1010          Cognitive Assessment/Intervention- PT/OT    Personal Safety Interventions  gait belt;fall prevention program maintained;nonskid shoes/slippers when out of bed;supervised activity  -SD     Recorded by [SD] Jose Mabry OTR 11/19/19 1302     Row Name 11/19/19 1010             Mobility Assessment/Intervention    Extremity Weight-bearing Status  right upper extremity  -SD      Right Upper Extremity (Weight-bearing Status)  non weight-bearing (NWB)  -SD      Recorded by [SD] Jose Mabry OTR 11/19/19 1329      Row Name 11/19/19 1010          Bed Mobility Assessment/Treatment    Sit-Supine [place] (Bed Mobility)  independent  -SD     Comment (Bed Mobility)  --     Recorded by [SD] Jose Mabry OTR 11/19/19 1302     Row Name 11/19/19 1010             Functional Mobility    Functional Mobility- Ind. Level  standby assist  -SD      Functional Mobility- Device  straight cane  -SD      Functional Mobility-Distance (Feet)  10  -SD      Recorded by [SD] Jose Mabry OTR 11/19/19 1302      Row Name 11/19/19 1010          Transfer Assessment/Treatment    Transfer Assessment/Treatment  --     Comment (Transfers)  Pt able to stand multiple times with SBA. Pt stood to adjust her shoulder brace, demonstrate LH sponge and for mobility activity.  -SD     Recorded by [SD] Jose Mabry OTR 11/19/19 1329     Row Name 11/19/19 1010          Sit-Stand Transfer    Sit-Stand [place] (Transfers)  stand by assist  -SD     Assistive Device (Sit-Stand Transfers)  cane, straight  -SD     Recorded by [SD] Jose Mabry OTR 11/19/19 1302     Row Name 11/19/19 1010          Stand-Sit Transfer    Stand-Sit [place] (Transfers)   stand by assist  -SD     Assistive Device (Stand-Sit Transfers)  cane, straight  -SD     Recorded by [SD] Jose Mabry, OTR 11/19/19 1302        Row Name 11/19/19 1010             Bathing Assessment/Intervention    Comment (Bathing)  Pt simulated use of lh sponge.   -SD      Recorded by [SD] Jose Mabry, OTR 11/19/19 1302      Row Name 11/19/19 1010             Upper Body Dressing Assessment/Training    Comment (Upper Body Dressing)  Pt states she has been practicing management of her brace. She is independent with doffing the brace. Min assist to don the brace. Pt states she will have family support available upon discharge to assist with adl's as needed  -SD      Recorded by [SD] Jose Mabry, OTR 11/19/19 1329      Row Name 11/19/19 1010             BADL Safety/Performance    Impairments, BADL Safety/Performance  range of motion;pain;strength of RUE due to shoulde surgery  -SD      Recorded by [SD] Jose Mabry, OTR 11/19/19 1316      Row Name 11/19/19 1010             Therapeutic Exercise    Exercise Type (Therapeutic Exercise)  AROM (active range of motion) of non-involved joint of RUE  -SD      Position (Therapeutic Exercise)  seated  -SD      Sets/Reps (Therapeutic Exercise)  1/10 (elbow to hand)  -SD      Recorded by [SD] Jose Mabry, OTR 11/19/19 1329      Row Name 11/19/19 1010          Positioning and Restraints    Pre-Treatment Position  sitting in chair/recliner  -SD     Post Treatment Position  bed  -SD     In Bed  call light within reach;encouraged to call for assist HOB elevated  -SD     In Chair  --     Recorded by [SD] Jose Mabry, OTR 11/19/19 1316     Row Name 11/19/19 1010          Pain Scale: Numbers Pre/Post-Treatment    Pain Scale: Numbers, Pretreatment  8/10  -SD     Pain Scale: Numbers, Post-Treatment  9/10  -SD     Pain Location - Side  Right  -SD     Pain Location - Orientation  --     Pain Location  shoulder  -SD     Pain Intervention(s)   Repositioned;Ambulation/increased activity  -SD     Recorded by [SD] Jose Mabry, OTR 11/19/19 1316        Row Name 11/19/19 1010          Plan of Care Review    Plan of Care Reviewed With  patient  -SD     Recorded by [SD] Jose Mabry, OTR 11/19/19 1316     Row Name 11/19/19 1010             Outcome Summary/Treatment Plan (OT)    Daily Summary of Progress (OT)  progress toward functional goals as expected  -SD      Plan for Continued Treatment (OT)  continue with plan. anticiapte discharge from OT services on 11-21-19  -SD2      Anticipated Discharge Disposition (OT)  home with assist;home with home health  -SD3      Recorded by [SD] Jose Mabry OTR 11/19/19 1316  [SD2] Jose Mabry, OTR 11/19/19 1329  [SD3] Jose Mabry, OTR 11/19/19 1330           User Key  (r) = Recorded By, (t) = Taken By, (c) = Cosigned By    Initials Name Effective Dates Discipline    SD Jose Mabry, OTR 06/22/16 -  OT    BP Arron Sarmiento, PT 04/03/18 -  PT    Latricia Mims, LPN 10/07/16 -  Nurse        Wound 11/15/19 1322 Right shoulder Incision (Active)   Dressing Appearance dry;intact 11/18/2019  8:36 PM   Closure SULEMAN 11/18/2019  8:36 PM   Base dressing in place, unable to visualize 11/18/2019  8:36 PM   Drainage Amount none 11/18/2019  8:36 PM       Occupational Therapy Education     Title: PT OT SLP Therapies (In Progress)     Topic: Occupational Therapy (In Progress)     Point: ADL training (Done)     Description: Instruct learner(s) on proper safety adaptation and remediation techniques during self care or transfers.   Instruct in proper use of assistive devices.    Learning Progress Summary           Patient Acceptance, E,TB,D, VU,DU by SD at 11/19/2019 12:57 PM    Comment:  Education regarding compensatory strategies and use of LH sponge for adl tasks. Pt simulated use of LH sponge.    Acceptance, E,TB,D, VU,DU by SD at 11/18/2019  2:02 PM    Comment:  Education regarding compensatory  strategies to manage adl activity with surgical restrictions following right reverse TSA. Pt needs min assist for adl activity due to rom/activity restrictions following shoulder surgery.    Acceptance, E,TB, VU by DAVIDSON at 11/15/2019  1:14 PM    Comment:  pt educated on safety with functional transfers, adls, and AROM of uninvolved joints                               User Key     Initials Effective Dates Name Provider Type Discipline    SD 06/22/16 -  Jose Mabry OTR Occupational Therapist OT    DAVIDSON 06/22/16 -  Mary Cade OTR Occupational Therapist OT                OT Recommendation and Plan  Outcome Summary/Treatment Plan (OT)  Daily Summary of Progress (OT): progress toward functional goals as expected  Plan for Continued Treatment (OT): continue with plan. anticiapte discharge from OT services on 11-21-19  Anticipated Discharge Disposition (OT): home with assist, home with home health  Daily Summary of Progress (OT): progress toward functional goals as expected  Plan of Care Review  Plan of Care Reviewed With: patient  Plan of Care Reviewed With: patient  Outcome Summary: OT:  Education regarding compensatory strategies and use of LH AE for adl management due to limited use of RUE following shoulder surgery. Pt states she will have family support as needed upon discharge for basic self care tasks. Pt was able to manage transfers and in room mobility with SBA and was independent with bed mobility.       Time Calculation:   Time Calculation- OT     Row Name 11/19/19 1258          Time Calculation- OT    OT Start Time  0940  -SD     OT Stop Time  1009  -SD     OT Time Calculation (min)  29 min  -SD        Timed Charges    01320 - Gait Training Minutes   --     97307 - OT Therapeutic Activity Minutes  29  -SD       User Key  (r) = Recorded By, (t) = Taken By, (c) = Cosigned By    Initials Name Provider Type    SD Jose Mabry, OTR Occupational Therapist        Therapy Charges for Today      Code Description Service Date Service Provider Modifiers Qty    [id number]  OT THERAPEUTIC ACT EA 15 MIN 11/19/2019 Jose Mabry, OTR GO 2               CARMEN Monahan  11/19/2019

## 2019-11-20 NOTE — SIGNIFICANT NOTE
11/20/19 1100   Rehab Treatment   Discipline occupational therapist   Reason Treatment Not Performed patient/family declined treatment  (pt states she is tired and refuses treatment today despite education)

## 2019-11-20 NOTE — PROGRESS NOTES
"SERVICE: [facility] HOSPITALIST    CONSULTANTS:    CHIEF COMPLAINT: f/u rehab s/p repair of right rotator cuff    SUBJECTIVE: The patient reports she is feeling well, had a lot of right shoulder pain last night after therapy. She notes relief with narcotic pain medication. She notes she is eating well, urinating and having BMs without difficulty. She otherwise denies f/c/cough/soa/chest pain/n/v/d/abdominal pain or other new concerns.    OBJECTIVE:    /74 (BP Location: Left arm, Patient Position: Lying)   Pulse 72   Temp 98 °F (36.7 °C) (Oral)   Resp 18   Ht 162.6 cm (64\") Comment: armspan  Wt 83.6 kg (184 lb 6.4 oz)   SpO2 94%   BMI 31.65 kg/m²     MEDS/LABS REVIEWED AND ORDERED    acetaminophen 1,300 mg Oral BID   acetaminophen 650 mg Oral Daily With Lunch   allopurinol 100 mg Oral Daily   amLODIPine 10 mg Oral QAM   aspirin 81 mg Oral Daily   buPROPion  mg Oral QAM   carvedilol 25 mg Oral BID With Meals   cholecalciferol 2,000 Units Oral Daily   clopidogrel 75 mg Oral Daily   docusate sodium 100 mg Oral BID   famotidine 20 mg Oral Daily   hydrALAZINE 75 mg Oral TID   lactobacillus acidophilus 1 capsule Oral Daily     Physical Exam   Constitutional: She is oriented to person, place, and time. She appears well-developed and well-nourished.   HENT:   Head: Normocephalic and atraumatic.   Eyes: EOM are normal. Pupils are equal, round, and reactive to light.   Cardiovascular: Normal rate and regular rhythm.   Grade 3/6 systolic murmur   Pulmonary/Chest: Effort normal and breath sounds normal. No stridor. No respiratory distress. She has no wheezes.   Abdominal: Soft. Bowel sounds are normal. She exhibits no distension. There is no tenderness. There is no guarding.   Musculoskeletal: She exhibits no edema.   Neurological: She is alert and oriented to person, place, and time.   Skin: Skin is warm and dry. No erythema.   Right shoulder incision with Steri-Strips in place with dried " "blood noted, without erythema or purulent drainage   Psychiatric: She has a normal mood and affect. Her behavior is normal.   Vitals reviewed.    LAB/DIAGNOSTICS:    Lab Results (last 24 hours)     Procedure Component Value Units Date/Time    POC Glucose Once [[id number]]  (Normal) Collected:  11/20/19 0621    Specimen:  Blood Updated:  11/20/19 0627     Glucose 99 mg/dL         No orders to display        No radiology results for the last day    ASSESSMENT/PLAN:  Rehab s/p right rotator cuff repair:  PT/OT ongoing, continue  Encourage patient take norco at night before sleep  Monitor     DM2 in obese: POA  Body mass index is 31.65 kg/m².  Glucose at goal on CCC diet  No medications noted per med rec  Check A1C and TSH in am    Chronic sCHF:POA  Chronic LBBB: POA  Cardiomyopathy: POA  PPM: POA  Renovascular Hypertension:POA  BP above goal. continue home amlodipine 10 mg daily, Coreg 25 mg twice daily, aspirin 81 mg daily, Plavix 75 mg daily  Increase hydralazine to 75 mg 3 times daily   Monitor BP, adjust as needed    COPD: No current acute issues    HLD: No acute issues, no medications noted    CKD stage 1:  H/O renal cell cancer: No current acute issues, check lab in a.m.    Anxiety: No acute issues on Wellbutrin  mg daily    H/O MGUS: POA    H/O Pulmonary nodules: POA    Chronic anemia/B12 deficiency: POA   No active blood loss noted, last hemoglobin 10.7 on 11/12/2019, recheck in am    H/O stroke/TIA: No current acute issues    Vitamin D deficiency: Add daily supplement    PLAN FOR DISPOSITION: BHAVYA Anaya  Hospitalist, [facility]  11/20/19  2:37 PM    \"Dictated utilizing Dragon dictation\"    "

## 2019-11-20 NOTE — SIGNIFICANT NOTE
11/20/19 1130   Rehab Treatment   Discipline physical therapist   Reason Treatment Not Performed patient/family declined treatment, not feeling well   Recommendation   PT - Next Appointment 11/21/19

## 2019-11-21 NOTE — THERAPY DISCHARGE NOTE
SNF - Occupational Therapy Treatment Note/Discharge  ALYSE Luna     Patient Name: Siobhan Dueñas  : 1935  MRN: [id number]  Today's Date: 2019  Onset of Illness/Injury or Date of Surgery: 19  Date of Referral to OT: 19  Referring Physician: Emmanuel      Admit Date: 2019    Visit Dx:   No diagnosis found.  Patient Active Problem List   Diagnosis   • Mixed hyperlipidemia   • Renovascular hypertension   • Left bundle branch block (LBBB)   • Multiple pulmonary nodules   • Cerebrovascular accident (CMS/HCC)   • Type 2 diabetes mellitus without complication (CMS/HCC)   • Cobalamin deficiency   • CKD (chronic kidney disease) stage 1, GFR 90 ml/min or greater   • Renal cell cancer (CMS/HCC)   • Anxiety   • Vitamin D deficiency   • Medication monitoring encounter   • Chronic systolic heart failure (CMS/HCC)   • Left ventricular dysfunction   • Mitral regurgitation   • Osteoarthritis   • Routine adult health maintenance   • DDD (degenerative disc disease), thoracic   • Cardiomyopathy (CMS/HCC)   • Statin myopathy   • Trimalleolar fracture of ankle, closed, right, with routine healing, subsequent encounter   • Hospital discharge follow-up   • Monoclonal gammopathy   • Memory deficit   • Supraspinatus tendon rupture, right, sequela   • Hyperuricemia   • TIA (transient ischemic attack)   • Bilateral sacral insufficiency fracture   • Chronic right shoulder pain   • Lumbar transverse process fracture, closed, initial encounter (CMS/[facility])   • Right rotator cuff tear arthropathy   • Spinal stenosis of lumbar region   • Age-related osteoporosis with current pathological fracture with routine healing   • Chronic UTI   • Uncontrolled hypertension   • Encounter for rehabilitation       Therapy Treatment    Rehabilitation Treatment Summary     Row Name 19 1115 19 0900          Treatment Time/Intention    Discipline  physical therapist  -JW  occupational therapist  -JEILEEN     Document Type  therapy  note (daily note)  -JW  therapy note (daily note)  -JJ     Subjective Information  no complaints  -JW  complains of;pain  -JJ     Mode of Treatment  physical therapy  -JW  occupational therapy  -JJ     Patient/Family Observations  pt up in chair   -JW  pt supine in bed agreed to treatment  -JJ     Care Plan Review  care plan/treatment goals reviewed  -JW  care plan/treatment goals reviewed  -JJ     Therapy Frequency (PT Clinical Impression)  5 times/wk  -JW  --     Patient Effort  good  -JW  adequate  -JJ     Comment  pt reports today is much better than yesterday  -JW  --     Existing Precautions/Restrictions  fall;non-weight bearing  -JW  fall;non-weight bearing NWB R UE  -JJ     Recorded by [JW] Yesenia Munguia, PT 11/21/19 1210 [JJ] Mary Cade, OTR 11/21/19 1157     Row Name 11/21/19 1115             Cognitive Assessment/Intervention- PT/OT    Orientation Status (Cognition)  oriented x 4  -JW      Follows Commands (Cognition)  WNL  -JW      Personal Safety Interventions  gait belt;nonskid shoes/slippers when out of bed  -JW      Recorded by [JW] Yesenia Munguia, PT 11/21/19 1210      Row Name 11/21/19 1115             Mobility Assessment/Intervention    Extremity Weight-bearing Status  right upper extremity  -JW      Right Upper Extremity (Weight-bearing Status)  non weight-bearing (NWB)  -JW      Recorded by [JW] Yesenia Munguia, PT 11/21/19 1210      Row Name 11/21/19 0900             Bed Mobility Assessment/Treatment    Supine-Sit [place] (Bed Mobility)  supervision  -JJ      Assistive Device (Bed Mobility)  bed rails  -JJ      Recorded by [JJ] Mary Cade, OTR 11/21/19 1157      Row Name 11/21/19 0900             Functional Mobility    Functional Mobility- Ind. Level  standby assist  -JJ      Functional Mobility- Device  straight cane  -JJ      Functional Mobility-Distance (Feet)  15 in room  -JJ      Recorded by [JJ] Mary Cade, OTR 11/21/19 1157      Row Name 11/21/19 1113  11/21/19 0900          Sit-Stand Transfer    Sit-Stand [place] (Transfers)  supervision  -JW  stand by assist  -J     Assistive Device (Sit-Stand Transfers)  cane, straight  -SAVI  cane, straight  -JJ     Recorded by [JW] Yesenia Munguia, PT 11/21/19 1210 [JJ] Mary Cade, OTR 11/21/19 1157     Row Name 11/21/19 1115 11/21/19 0900          Stand-Sit Transfer    Stand-Sit [place] (Transfers)  supervision  -JW  stand by assist  -JJ     Assistive Device (Stand-Sit Transfers)  cane, straight  -JW  cane, straight  -JJ     Recorded by [JW] Yesenia Munguia, PT 11/21/19 1210 [JJ] Mary Cade, OTR 11/21/19 1157     Row Name 11/21/19 1115             Gait/Stairs Assessment/Training    Gait/Stairs Assessment/Training  gait/ambulation assistive device  -JW      [place] Level (Gait)  supervision  -JW      Assistive Device (Gait)  cane, straight  -JW      Distance in Feet (Gait)  200  -JW      Deviations/Abnormal Patterns (Gait)  gait speed decreased  -JW      Comment (Gait/Stairs)  safe with no loss of balance  -JW      Recorded by [JW] Yesenia Munguia, PT 11/21/19 1210      Row Name 11/21/19 0900             Toileting Assessment/Training    [place] Level (Toileting)  adjust/manage clothing;perform perineal hygiene SBA  -JJ      Recorded by [JJ] Mary Cade, OTR 11/21/19 1157      Row Name 11/21/19 1115 11/21/19 0900          Positioning and Restraints    Pre-Treatment Position  sitting in chair/recliner  -JW  in bed  -JJ     Post Treatment Position  chair  -JW  chair  -JJ     In Chair  reclined;sitting;call light within reach;encouraged to call for assist;RUE elevated  -JW  reclined;call light within reach;encouraged to call for assist  -JJ     Recorded by [JW] Yesenia Munguia, PT 11/21/19 1210 [JJ] Mary Cade, OTR 11/21/19 1157     Row Name 11/21/19 1115 11/21/19 0900          Pain Scale: Numbers Pre/Post-Treatment    Pain Scale: Numbers, Pretreatment  2/10  -JW  8/10  -JEILEEN      Pain Scale: Numbers, Post-Treatment  2/10  -JW  8/10  -JJ     Pain Location - Side  Right  -JW  Right  -JJ     Pain Location - Orientation  incisional  -JW  incisional  -JJ     Pain Location  shoulder  -JW  shoulder  -JJ     Pain Intervention(s)  Repositioned  -JW  Repositioned  -JJ     Recorded by [JW] Yesenia Munguia, PT 11/21/19 1210 [JJ] Mary Cade, OTR 11/21/19 1157     Row Name                Wound 11/15/19 1322 Right shoulder Incision    Wound - Properties Group Date first assessed: 11/15/19 [KB] Time first assessed: 1322 [KB] Side: Right [KB] Location: shoulder [KB] Primary Wound Type: Incision [KB] Recorded by:  [KB] Latricia Sharpe LPN 11/15/19 1322    Row Name 11/21/19 1115             Coping    Observed Emotional State  accepting  -JW      Verbalized Emotional State  acceptance  -JW      Recorded by [JW] Yesenia Munguia, PT 11/21/19 1210      Row Name 11/21/19 1115 11/21/19 0900          Plan of Care Review    Plan of Care Reviewed With  patient  -JW  patient  -JJ     Recorded by [JW] Yesenia Munguia, PT 11/21/19 1210 [JJ] Mary Cade, OTR 11/21/19 1157     Row Name 11/21/19 0900             Outcome Summary/Treatment Plan (OT)    Daily Summary of Progress (OT)  progress toward functional goals as expected  -JJ      Plan for Continued Treatment (OT)  pt met all OT goals, anticipate pt to be discharged home with assist from daughter  -JJ      Recorded by [JJ] Mary Cade, OTR 11/21/19 1157      Row Name 11/21/19 1115             Outcome Summary/Treatment Plan (PT)    Daily Summary of Progress (PT)  progress toward functional goals is good  -JW      Plan for Continued Treatment (PT)  plan to dc 11-22-10  -JW      Anticipated Equipment Needs at Discharge (PT)  standard cane  -JW      Anticipated Discharge Disposition (PT)  home with home health;home with assist  -JW      Patient/Family Concerns, Anticipated Discharge Disposition (PT)  NWB RUE  -JW      Recorded by [JW] Yesenia Munguia, PT  11/21/19 1210        User Key  (r) = Recorded By, (t) = Taken By, (c) = Cosigned By    Initials Name Effective Dates Discipline    Mary Lew, OTR 06/22/16 -  OT    Latricia Mims R, LPN 10/07/16 -  Nurse    SAVI Munguia Yesenia, PT 06/20/18 -  PT        Wound 11/15/19 1322 Right shoulder Incision (Active)   Dressing Appearance dry;intact 11/20/2019  8:55 PM       Rehab Goal Summary     Row Name 11/21/19 0900             Transfer Goal 1 (OT)    Activity/Assistive Device (Transfer Goal 1, OT)  toilet;commode, 3-in-1;cane, straight  -JJ      [place] Level/Cues Needed (Transfer Goal 1, OT)  supervision required  -JJ      Progress/Outcome (Transfer Goal 1, OT)  goal met  -JJ         Bathing Goal 1 (OT)    Activity/Assistive Device (Bathing Goal 1, OT)  upper body bathing;lower body bathing;long-handled sponge  -JJ      [place] Level/Cues Needed (Bathing Goal 1, OT)  minimum assist (75% or more patient effort)  -JJ      Progress/Outcomes (Bathing Goal 1, OT)  goal met  -JJ         Dressing Goal 1 (OT)    Activity/Assistive Device (Dressing Goal 1, OT)  upper body dressing  -JJ      [place]/Cues Needed (Dressing Goal 1, OT)  minimum assist (75% or more patient effort)  -JJ      Progress/Outcome (Dressing Goal 1, OT)  goal met  -JJ         Patient Education Goal (OT)    Activity (Patient Education Goal, OT)  pt will demonstrate independence with AROM of uninvolved joints of R UE  -JJ      [place]/Cues/Accuracy (Memory Goal 2, OT)  demonstrates adequately  -JJ      Time Frame (Patient Education Goal, OT)  1 week  -JJ      Progress/Outcome (Patient Education Goal, OT)  goal met  -JJ        User Key  (r) = Recorded By, (t) = Taken By, (c) = Cosigned By    Initials Name Provider Type Discipline    Mary Lew, OTR Occupational Therapist OT          Occupational Therapy Education     Title: PT OT SLP Therapies (In Progress)     Topic: Occupational Therapy (In Progress)     Point:  ADL training (Resolved)     Description: Instruct learner(s) on proper safety adaptation and remediation techniques during self care or transfers.   Instruct in proper use of assistive devices.    Learning Progress Summary           Patient Acceptance, E,TB, VU by DAVIDSON at 11/21/2019 12:31 PM    Comment:  pt educated on AROM of uninvolved joints, adls including brace management and safety with functional transfers and mobility    Acceptance, E,TB,D, VU,DU by SD at 11/19/2019 12:57 PM    Comment:  Education regarding compensatory strategies and use of LH sponge for adl tasks. Pt simulated use of LH sponge.    Acceptance, E,TB,D, VU,DU by SD at 11/18/2019  2:02 PM    Comment:  Education regarding compensatory strategies to manage adl activity with surgical restrictions following right reverse TSA. Pt needs min assist for adl activity due to rom/activity restrictions following shoulder surgery.    Acceptance, E,TB, VU by DAVIDSON at 11/15/2019  1:14 PM    Comment:  pt educated on safety with functional transfers, adls, and AROM of uninvolved joints                               User Key     Initials Effective Dates Name Provider Type Discipline    SD 06/22/16 -  Jose Mabry, OTR Occupational Therapist OT    DAVIDSON 06/22/16 -  Mary Cade OTR Occupational Therapist OT                OT Recommendation and Plan  Outcome Summary/Treatment Plan (OT)  Daily Summary of Progress (OT): progress toward functional goals as expected  Plan for Continued Treatment (OT): pt met all OT goals, anticipate pt to be discharged home with assist from daughter  Anticipated Discharge Disposition (OT): home with assist, home with home health  Planned Therapy Interventions (OT Eval): BADL retraining, functional balance retraining, transfer/mobility retraining  Therapy Frequency (OT Eval): 5 times/wk  Daily Summary of Progress (OT): progress toward functional goals as expected  Plan of Care Review  Plan of Care Reviewed With: patient  Plan  of Care Reviewed With: patient  Outcome Summary: OT evaluation completed. pt performed supine to sit transfer with supervison. pt performed functional transfers and mobility with SPC with SBA. pt verbalizes understanding of AROM of uninvolved joints of R UE, brace management and adls. pt will require min assist with adls while R UE is immobiilized. pt states duaghter is available to assist whn needed. pt has met all OT goals. No further skilled OT recommendations at this time. rec home health safety evaluation upon discharge from facility         Time Calculation:    Time Calculation- OT     Row Name 11/21/19 1238 11/21/19 1212          Time Calculation- OT    OT Start Time  0900  -JJ  --     OT Stop Time  0920  -JJ  --     OT Time Calculation (min)  20 min  -JJ  --     TCU Minutes- OT  20 min  -JJ  --        Timed Charges    65451 - Gait Training Minutes   --  20  -JW       User Key  (r) = Recorded By, (t) = Taken By, (c) = Cosigned By    Initials Name Provider Type    Mary Lew OTR Occupational Therapist    Yesenia Fried, PT Physical Therapist        Therapy Suggested Charges     Code   Minutes Charges    04594 (CPT®) Hc Ot Neuromusc Re Education Ea 15 Min      36418 (CPT®) Hc Ot Ther Proc Ea 15 Min      05091 (CPT®) Hc Ot Therapeutic Act Ea 15 Min 29 2    64495 (CPT®) Hc Ot Manual Therapy Ea 15 Min      63942 (CPT®) Hc Ot Iontophoresis Ea 15 Min      50805 (CPT®) Hc Ot Elec Stim Ea-Per 15 Min      19780 (CPT®) Hc Ot Ultrasound Ea 15 Min      57270 (CPT®) Hc Ot Self Care/Mgmt/Train Ea 15 Min      Total  29 2        Therapy Charges for Today     Code Description Service Date Service Provider Modifiers Qty    84754225551 HC OT SELF CARE/MGMT/TRAIN EA 15 MIN 11/21/2019 Mary Cade OTR GO 1               OT Discharge Summary  Anticipated Discharge Disposition (OT): home with assist, home with home health  Reason for Discharge: All goals achieved  Outcomes Achieved: Able to achieve all  goals within established timeline  Discharge Destination: Home with home health, Home with assist    Mary Cade, OTR  11/21/2019

## 2019-11-21 NOTE — PLAN OF CARE
Problem: Patient Care Overview  Goal: Plan of Care Review   11/21/19 1211   Coping/Psychosocial   Plan of Care Reviewed With patient   OTHER   Outcome Summary PT tx: Pt worked on ambulation in hallways, Pt able to walk 200 ft with st cane and dmeonstrated good balance and safety. She was able to ambulate around objects and change directions without difficulty.   Plan of Care Review   Progress improving

## 2019-11-21 NOTE — PLAN OF CARE
Problem: Patient Care Overview  Goal: Plan of Care Review   11/21/19 1433   Coping/Psychosocial   Plan of Care Reviewed With patient   OTHER   Outcome Summary OT evaluation completed. pt performed supine to sit transfer with supervison. pt performed functional transfers and mobility with SPC with SBA. pt verbalizes understanding of AROM of uninvolved joints of R UE, brace management and adls. pt will require min assist with adls while R UE is immobiilized. pt states duaghter is available to assist whn needed. pt has met all OT goals. No further skilled OT recommendations at this time. rec home health safety evaluation upon discharge from facility

## 2019-11-21 NOTE — THERAPY TREATMENT NOTE
SNF - Physical Therapy Treatment Note  ALYSE Luna     Patient Name: Siobhan Dueñas  : 1935  MRN: [id number]  Today's Date: 2019  Onset of Illness/Injury or Date of Surgery: 19  Date of Referral to PT: 19  Referring Physician: Emmanuel    Admit Date: 2019    Visit Dx:  No diagnosis found.  Patient Active Problem List   Diagnosis   • Mixed hyperlipidemia   • Renovascular hypertension   • Left bundle branch block (LBBB)   • Multiple pulmonary nodules   • Cerebrovascular accident (CMS/HCC)   • Type 2 diabetes mellitus without complication (CMS/HCC)   • Cobalamin deficiency   • CKD (chronic kidney disease) stage 1, GFR 90 ml/min or greater   • Renal cell cancer (CMS/HCC)   • Anxiety   • Vitamin D deficiency   • Medication monitoring encounter   • Chronic systolic heart failure (CMS/HCC)   • Left ventricular dysfunction   • Mitral regurgitation   • Osteoarthritis   • Routine adult health maintenance   • DDD (degenerative disc disease), thoracic   • Cardiomyopathy (CMS/HCC)   • Statin myopathy   • Trimalleolar fracture of ankle, closed, right, with routine healing, subsequent encounter   • Hospital discharge follow-up   • Monoclonal gammopathy   • Memory deficit   • Supraspinatus tendon rupture, right, sequela   • Hyperuricemia   • TIA (transient ischemic attack)   • Bilateral sacral insufficiency fracture   • Chronic right shoulder pain   • Lumbar transverse process fracture, closed, initial encounter (CMS/[facility])   • Right rotator cuff tear arthropathy   • Spinal stenosis of lumbar region   • Age-related osteoporosis with current pathological fracture with routine healing   • Chronic UTI   • Uncontrolled hypertension   • Encounter for rehabilitation       Therapy Treatment    Rehabilitation Treatment Summary     Row Name 19 1115 19 0900          Treatment Time/Intention    Discipline  physical therapist  -JW  occupational therapist  -JEILEEN     Document Type  therapy note (daily note)   -JW  therapy note (daily note)  -JJ     Subjective Information  no complaints  -JW  complains of;pain  -JJ     Mode of Treatment  physical therapy  -JW  occupational therapy  -JJ     Patient/Family Observations  pt up in chair   -JW  pt supine in bed agreed to treatment  -JJ     Care Plan Review  care plan/treatment goals reviewed  -JW  care plan/treatment goals reviewed  -JJ     Therapy Frequency (PT Clinical Impression)  5 times/wk  -JW  --     Patient Effort  good  -JW  adequate  -JJ     Comment  pt reports today is much better than yesterday  -JW  --     Existing Precautions/Restrictions  fall;non-weight bearing  -JW  fall;non-weight bearing NWB R UE  -JJ     Recorded by [JW] Yesenia Munguia, PT 11/21/19 1210 [JJ] Mary Cade, OTR 11/21/19 1157     Row Name 11/21/19 1115             Cognitive Assessment/Intervention- PT/OT    Orientation Status (Cognition)  oriented x 4  -JW      Follows Commands (Cognition)  WNL  -JW      Personal Safety Interventions  gait belt;nonskid shoes/slippers when out of bed  -JW      Recorded by [JW] Yesenia Munguia, PT 11/21/19 1210      Row Name 11/21/19 1115             Mobility Assessment/Intervention    Extremity Weight-bearing Status  right upper extremity  -JW      Right Upper Extremity (Weight-bearing Status)  non weight-bearing (NWB)  -JW      Recorded by [JW] Yesenia Munguia, PT 11/21/19 1210      Row Name 11/21/19 0900             Bed Mobility Assessment/Treatment    Supine-Sit [place] (Bed Mobility)  supervision  -JJ      Assistive Device (Bed Mobility)  bed rails  -JJ      Recorded by [JJ] Mary Cdae, OTR 11/21/19 1157      Row Name 11/21/19 0900             Functional Mobility    Functional Mobility- Ind. Level  standby assist  -JJ      Functional Mobility- Device  straight cane  -JJ      Functional Mobility-Distance (Feet)  15 in room  -JJ      Recorded by [JJ] Mary Cade, OTR 11/21/19 1157      Row Name 11/21/19 1115 11/21/19 0900           Sit-Stand Transfer    Sit-Stand [place] (Transfers)  supervision  -JW  stand by assist  -     Assistive Device (Sit-Stand Transfers)  cane, straight  -SAVI  cane, straight  -JJ     Recorded by [JW] Yesenia Munguia, PT 11/21/19 1210 [JJ] Mary Cade, OTR 11/21/19 1157     Row Name 11/21/19 1115 11/21/19 0900          Stand-Sit Transfer    Stand-Sit [place] (Transfers)  supervision  -JW  stand by assist  -J     Assistive Device (Stand-Sit Transfers)  cane, straight  -JW  cane, straight  -JJ     Recorded by [JW] Yesenia Munguia, PT 11/21/19 1210 [JJ] Mary Cade, OTR 11/21/19 1157     Row Name 11/21/19 1115             Gait/Stairs Assessment/Training    Gait/Stairs Assessment/Training  gait/ambulation assistive device  -JW      [place] Level (Gait)  supervision  -      Assistive Device (Gait)  cane, straight  -JW      Distance in Feet (Gait)  200  -JW      Deviations/Abnormal Patterns (Gait)  gait speed decreased  -JW      Comment (Gait/Stairs)  safe with no loss of balance  -JW      Recorded by [JW] Yesenia Munguia, PT 11/21/19 1210      Row Name 11/21/19 0900             Toileting Assessment/Training    [place] Level (Toileting)  adjust/manage clothing;perform perineal hygiene SBA  -JJ      Recorded by [JJ] Mary Cade, OTR 11/21/19 1157      Row Name 11/21/19 1115 11/21/19 0900          Positioning and Restraints    Pre-Treatment Position  sitting in chair/recliner  -JW  in bed  -JJ     Post Treatment Position  chair  -JW  chair  -JJ     In Chair  reclined;sitting;call light within reach;encouraged to call for assist;RUE elevated  -JW  reclined;call light within reach;encouraged to call for assist  -JJ     Recorded by [JW] Yesenia Munguia, PT 11/21/19 1210 [JJ] Mary Cade, OTR 11/21/19 1157     Row Name 11/21/19 1115 11/21/19 0900          Pain Scale: Numbers Pre/Post-Treatment    Pain Scale: Numbers, Pretreatment  2/10  -JW  8/10  -JJ     Pain Scale: Numbers,  Post-Treatment  2/10  -JW  8/10  -JJ     Pain Location - Side  Right  -JW  Right  -JJ     Pain Location - Orientation  incisional  -JW  incisional  -JJ     Pain Location  shoulder  -JW  shoulder  -JJ     Pain Intervention(s)  Repositioned  -JW  Repositioned  -JJ     Recorded by [JW] Yesenia Munguia, PT 11/21/19 1210 [JJ] Mary Cade, OTR 11/21/19 1157     Row Name                Wound 11/15/19 1322 Right shoulder Incision    Wound - Properties Group Date first assessed: 11/15/19 [KB] Time first assessed: 1322 [KB] Side: Right [KB] Location: shoulder [KB] Primary Wound Type: Incision [KB] Recorded by:  [KB] Latricia Sharpe LPN 11/15/19 1322    Row Name 11/21/19 1115             Coping    Observed Emotional State  accepting  -JW      Verbalized Emotional State  acceptance  -JW      Recorded by [JW] Yesenia Munguia, PT 11/21/19 1210      Row Name 11/21/19 1115 11/21/19 0900          Plan of Care Review    Plan of Care Reviewed With  patient  -JW  patient  -JJ     Recorded by [JW] Yesenia Munguia, PT 11/21/19 1210 [JJ] Mary Cade, OTR 11/21/19 1157     Row Name 11/21/19 0900             Outcome Summary/Treatment Plan (OT)    Daily Summary of Progress (OT)  progress toward functional goals as expected  -JJ      Plan for Continued Treatment (OT)  pt met all OT goals, anticipate pt to be discharged home with assist from daughter  -JJ      Recorded by [JJ] Mary Cade, OTR 11/21/19 1157      Row Name 11/21/19 1115             Outcome Summary/Treatment Plan (PT)    Daily Summary of Progress (PT)  progress toward functional goals is good  -JW      Plan for Continued Treatment (PT)  plan to dc 11-22-10  -JW      Anticipated Equipment Needs at Discharge (PT)  standard cane  -JW      Anticipated Discharge Disposition (PT)  home with home health;home with assist  -JW      Patient/Family Concerns, Anticipated Discharge Disposition (PT)  NWB RUE  -JW      Recorded by [JW] Yesenia Munguia, PT 11/21/19 1210         User Key  (r) = Recorded By, (t) = Taken By, (c) = Cosigned By    Initials Name Effective Dates Discipline    Mary Lew, OTR 06/22/16 -  OT    Latricia Mims, LPN 10/07/16 -  Nurse    Yesenia Fried, PT 06/20/18 -  PT          Wound 11/15/19 1322 Right shoulder Incision (Active)   Dressing Appearance dry;intact 11/20/2019  8:55 PM           Physical Therapy Education     Title: PT OT SLP Therapies (In Progress)     Topic: Physical Therapy (Done)     Point: Mobility training (Done)     Learning Progress Summary           Patient Acceptance, E, VU by  at 11/21/2019 12:10 PM    Acceptance, E,TB, VU by  at 11/19/2019 11:43 AM    Acceptance, E,TB, VU by  at 11/18/2019 11:05 AM    Acceptance, E,TB, VU by  at 11/15/2019 11:52 AM                   Point: Precautions (Done)     Learning Progress Summary           Patient Acceptance, E, VU by  at 11/21/2019 12:10 PM    Acceptance, E,TB, VU by  at 11/19/2019 11:43 AM    Acceptance, E,TB, VU by  at 11/18/2019 11:05 AM    Acceptance, E,TB, VU by  at 11/15/2019 11:52 AM                               User Key     Initials Effective Dates Name Provider Type Discipline     04/03/18 -  Arron Sarmiento, PT Physical Therapist PT     06/20/18 -  Yesenia Munguia, PT Physical Therapist PT                PT Recommendation and Plan  Anticipated Discharge Disposition (PT): home with home health, home with assist  Therapy Frequency (PT Clinical Impression): 5 times/wk  Outcome Summary/Treatment Plan (PT)  Daily Summary of Progress (PT): progress toward functional goals is good  Plan for Continued Treatment (PT): plan to dc 11-22-10  Anticipated Equipment Needs at Discharge (PT): standard cane  Anticipated Discharge Disposition (PT): home with home health, home with assist  Patient/Family Concerns, Anticipated Discharge Disposition (PT): NWB [facility]  Plan of Care Reviewed With: patient  Progress: improving  Outcome Summary: PT tx: Pt worked on ambulation in  hallways, Pt able to walk 200 ft with st cane and dmeonstrated good balance and safety. She was able to ambulate around objects and change directions without difficulty.     Time Calculation:   PT Charges     Row Name 11/21/19 1212             Time Calculation    Start Time  1115  -      Stop Time  1135  -      Time Calculation (min)  20 min  -         Timed Charges    17662 - Gait Training Minutes   20  -        User Key  (r) = Recorded By, (t) = Taken By, (c) = Cosigned By    Initials Name Provider Type    Yesenia Fried, PT Physical Therapist        Therapy Charges for Today     Code Description Service Date Service Provider Modifiers Qty    09995121366 HC GAIT TRAINING EA 15 MIN 11/21/2019 Yesenia Munguia, PT GP 1               Yesenia Trammell PT  11/21/2019

## 2019-11-22 NOTE — NURSING NOTE
Patient to go home Saturday November 22 with [facility] health PT/OT/Safety mk. Patient PCP Marino Higgins follow up appointment scheduled for Wednesday 11/-27-19 at 1pm    Follow up with [facility] specialist on 11-20-19 at 2:30pm    Follow up with Dr Lisset burgos scheduled on 12-2-19 at 2:15pm

## 2019-11-22 NOTE — PLAN OF CARE
Problem: Patient Care Overview  Goal: Plan of Care Review  Outcome: Ongoing (interventions implemented as appropriate)   11/21/19 2335   Coping/Psychosocial   Plan of Care Reviewed With patient       Problem: Fall Risk (Adult)  Goal: Absence of Fall  Outcome: Ongoing (interventions implemented as appropriate)   11/21/19 2335   Fall Risk (Adult)   Absence of Fall making progress toward outcome       Problem: Pain, Acute (Adult)  Goal: Acceptable Pain Control/Comfort Level  Outcome: Ongoing (interventions implemented as appropriate)   11/21/19 2335   Pain, Acute (Adult)   Acceptable Pain Control/Comfort Level making progress toward outcome

## 2019-11-22 NOTE — NURSING NOTE
Seen for weekly CWON skin assessment on rehab unit today. All bony areas clear and intact. Right shoulder incision bozena with steri strips intact. Incision is well approximated and healing well. No s/s of infection noted. Probable dc home tomorrow.

## 2019-11-22 NOTE — THERAPY DISCHARGE NOTE
SNF - Physical Therapy Discharge Summary   Makeda Beasley       Patient Name: Siobhan Dueñas  : 1935  MRN: [id number]    Today's Date: 2019  Onset of Illness/Injury or Date of Surgery: 19    Date of Referral to PT: 19  Referring Physician: Emmanuel      Admit Date: 2019      PT Recommendation and Plan    Visit Dx:  No diagnosis found.            Rehab Goal Summary     Row Name 19 1246             Bed Mobility Goal 1 (PT)    Activity/Assistive Device (Bed Mobility Goal 1, PT)  bed mobility activities, all  -BP      [place] Level/Cues Needed (Bed Mobility Goal 1, PT)  supervision required  -BP      Time Frame (Bed Mobility Goal 1, PT)  1 week  -BP      Progress/Outcomes (Bed Mobility Goal 1, PT)  goal met per patient report   -BP         Transfer Goal 1 (PT)    Activity/Assistive Device (Transfer Goal 1, PT)  sit-to-stand/stand-to-sit with appropriate assistivce device   -BP      [place] Level/Cues Needed (Transfer Goal 1, PT)  supervision required  -BP      Time Frame (Transfer Goal 1, PT)  1 week  -BP      Progress/Outcome (Transfer Goal 1, PT)  goal met  -BP         Gait Training Goal 1 (PT)    Activity/Assistive Device (Gait Training Goal 1, PT)  gait (walking locomotion) with appropriate assistive device   -BP      [place] Level (Gait Training Goal 1, PT)  supervision required  -BP      Distance (Gait Goal 1, PT)  100  -BP      Time Frame (Gait Training Goal 1, PT)  1 week  -BP      Progress/Outcome (Gait Training Goal 1, PT)  goal met  -BP         Stairs Goal 1 (PT)    Activity/Assistive Device (Stairs Goal 1, PT)  ascending stairs;descending stairs with appropriate assistive device   -BP      [place] Level/Cues Needed (Stairs Goal 1, PT)  contact guard assist  -BP      Number of Stairs (Stairs Goal 1, PT)  12 with one hanrdrail   -BP      Time Frame (Stairs Goal 1, PT)  1 week  -BP      Progress/Outcome (Stairs Goal 1, PT)  goal partially met 10 stairs 1  "handrail with CGA  -BP        User Key  (r) = Recorded By, (t) = Taken By, (c) = Cosigned By    Initials Name Provider Type Discipline    Arron Thomas, PT Physical Therapist PT              PT Discharge Summary  Anticipated Discharge Disposition (PT): home with home health, home with assist  Reason for Discharge: Maximum functional potential achieved  Outcomes Achieved: Patient able to partially acheive established goals  Discharge Destination: Home with home health, Home with assist  S: Patient reports she has no concerns for return home. She refuses need for further stair training or gait training. She states \"everythinng you have done with me I've done before, I don't need to practice anymore.\"  O: patient has been seen by PT daily 5x/week x 1 week with an emphasis on improved functional mobility and safety with AD. Patient is independent with bed mobility, she performs sit to/from stand transfers with supervision, and gait up to 200 feet with supervision with use of straight cane. She has ascended/descended 10 stairs with one handrail with CGA.   A: Patient has not met or partially met all functional goals. She denies need for continued physical therapy.    P: Plan to discharge patient from PT at this time. Recommend continued use of straight cane for mobility. Recommend home health PT at discharge as well as increased supervision due to NWB status of R UE. Patient agreeable and reports daughter will be present to assist as needed.     Arron Sarmiento, PT   11/22/2019       "

## 2019-11-22 NOTE — SIGNIFICANT NOTE
11/22/19 1243   Rehab Treatment   Discipline physical therapist   Reason Treatment Not Performed patient/family declined treatment  (Patient politely refuses participation in PT today. She reports she is ambulating independently in the room and denies need for further gait or stair training. Will discharge from PT at this time. )

## 2019-11-22 NOTE — NURSING NOTE
Patient to be discharged to home with daughter on 11/23/19 with [facility] Pt/OT/Safety eval.     Follow up with pcp Marino Higigns 11/27/19 at 1pm    Follow up with Dr. Lisset burgos 12/2/19 at 2:15pm    Follow up with Nichole LATIF with [place] [facility] specialist to be made by patients daughter as per our discharge planning discussion. (patient canceled apt while in SNF)     Patient has walker, w/c and bath seat. [place] has provided Pt with a straight cane. No other DME required.

## 2019-11-23 NOTE — PLAN OF CARE
Problem: Patient Care Overview  Goal: Plan of Care Review  Outcome: Ongoing (interventions implemented as appropriate)   11/22/19 2310   Coping/Psychosocial   Plan of Care Reviewed With patient       Problem: Fall Risk (Adult)  Goal: Absence of Fall  Outcome: Ongoing (interventions implemented as appropriate)   11/22/19 2310   Fall Risk (Adult)   Absence of Fall making progress toward outcome       Problem: Pain, Acute (Adult)  Goal: Acceptable Pain Control/Comfort Level  Outcome: Ongoing (interventions implemented as appropriate)   11/22/19 2310   Pain, Acute (Adult)   Acceptable Pain Control/Comfort Level making progress toward outcome

## 2019-11-23 NOTE — PLAN OF CARE
Problem: Patient Care Overview  Goal: Plan of Care Review  Outcome: Ongoing (interventions implemented as appropriate)   11/23/19 1057   Coping/Psychosocial   Plan of Care Reviewed With patient   Plan of Care Review   Progress improving       Problem: Fall Risk (Adult)  Goal: Absence of Fall  Outcome: Ongoing (interventions implemented as appropriate)   11/23/19 1057   Fall Risk (Adult)   Absence of Fall making progress toward outcome       Problem: Skin Injury Risk (Adult)  Goal: Identify Related Risk Factors and Signs and Symptoms  Outcome: Ongoing (interventions implemented as appropriate)   11/23/19 1057   Skin Injury Risk (Adult)   Related Risk Factors (Skin Injury Risk) hospitalization prolonged;mobility impaired;advanced age     Goal: Skin Health and Integrity  Outcome: Ongoing (interventions implemented as appropriate)   11/23/19 1057   Skin Injury Risk (Adult)   Skin Health and Integrity making progress toward outcome       Problem: Pain, Acute (Adult)  Goal: Acceptable Pain Control/Comfort Level  Outcome: Ongoing (interventions implemented as appropriate)   11/23/19 1057   Pain, Acute (Adult)   Acceptable Pain Control/Comfort Level making progress toward outcome

## 2019-11-23 NOTE — DISCHARGE SUMMARY
Siobhanherberth Dueñas  1935  [id number]    Hospitalists Discharge Summary    Date of Admission: 11/14/2019  Date of Discharge:  11/23/2019    Primary Discharge Diagnoses:    1.  Rehab s/p Right Rotator Cuff Repair    Secondary Discharge Diagnoses:     1.  Essential HTN  2.  Chronic Systolic CHF  3.  Dyslipidemia  4.  Obesity    History of Present Illness (taken from H&P):    Patient had surgery at [facility] and is here now for rehabilitation.  Her daughter is in the room with her.  She is alert and oriented and knows me from a prior meeting in ER.  I have reviewed her medications with the nursing staff and ordered PT and OT.    Rehab and Skilled Care Course:    Continued to participate w/ PT and OT.  Blood pressure trend was up so her Hydralazine dose was increased.  No evidence of volume overload.      PCP  Patient Care Team:  Marino Higgins MD as PCP - General (Family Medicine)  Marino Lilly MD as PCP - Claims Attributed  Ozzie Velasquez MD as Consulting Physician (Cardiology)  Saroj Barnard III, MD as Surgeon (Thoracic Surgery)  Nguyễn Montague MD as Consulting Physician (Ophthalmology)  Sukhi Swift DPM (Podiatry)  Marino Higgins MD as Referring Physician (Family Medicine)  Marino Lilly MD as Consulting Physician (Hematology and Oncology)  Jose Jarvis MD as Consulting Physician (Orthopedic Surgery)  Jaison Darling MD as Surgeon (Orthopedic Surgery)    Consults:   Consults     No orders found from 10/16/2019 to 11/15/2019.          Operations and Procedures Performed:       No results found.    Allergies:  is allergic to hydromorphone; atenolol; codeine; diovan [valsartan]; gabapentin; and oxycodone.    Michel  reviewed    Discharge Medications:     Discharge Medications      New Medications      Instructions Start Date   HYDROcodone-acetaminophen 5-325 MG per tablet  Commonly known as:  NORCO   1 tablet, Oral, Every 6 Hours PRN      lactobacillus acidophilus  capsule capsule   1 capsule, Oral, Daily   Start Date:  11/24/2019        Changes to Medications      Instructions Start Date   hydrALAZINE 25 MG tablet  Commonly known as:  APRESOLINE  What changed:    · medication strength  · how much to take   75 mg, Oral, 3 Times Daily         Continue These Medications      Instructions Start Date   albuterol sulfate  (90 Base) MCG/ACT inhaler  Commonly known as:  PROVENTIL HFA;VENTOLIN HFA;PROAIR HFA   2 puffs, Inhalation      allopurinol 100 MG tablet  Commonly known as:  ZYLOPRIM   100 mg, Oral, Daily      amLODIPine 10 MG tablet  Commonly known as:  NORVASC   10 mg, Oral, Every Morning      aspirin 81 MG chewable tablet   81 mg, Oral, Daily      buPROPion  MG 24 hr tablet  Commonly known as:  WELLBUTRIN XL   TAKE ONE TABLET BY MOUTH EVERY MORNING      carvedilol 25 MG tablet  Commonly known as:  COREG   25 mg, Oral, 2 Times Daily With Meals      clopidogrel 75 MG tablet  Commonly known as:  PLAVIX   75 mg, Oral, Daily      docusate sodium 100 MG capsule  Commonly known as:  COLACE   100 mg, Oral, 2 Times Daily      famotidine 20 MG tablet  Commonly known as:  PEPCID   20 mg, Oral, Daily      ondansetron 4 MG tablet  Commonly known as:  ZOFRAN   4 mg, Oral, Every 6 Hours PRN      REPATHA 140 MG/ML solution prefilled syringe  Generic drug:  Evolocumab   140 mg, Subcutaneous, Every 14 Days      TYLENOL 8 HOUR PO   650 mg, Oral, Take As Directed, 5x per day   2 in am, 1 at lunch, 2 pm      vitamin D 1.25 MG (34258 UT) capsule capsule  Commonly known as:  ERGOCALCIFEROL   TAKE ONE CAPSULE BY MOUTH ONCE WEEKLY         Stop These Medications    cephalexin 500 MG capsule  Commonly known as:  KEFLEX            Last Lab Results:   Lab Results (most recent)     Procedure Component Value Units Date/Time    CBC & Differential [[id number]] Collected:  11/23/19 0643    Specimen:  Blood Updated:  11/23/19 0646    Narrative:       The following orders were created for panel  order CBC & Differential.  Procedure                               Abnormality         Status                     ---------                               -----------         ------                     CBC Auto Differential[[id number]]        Abnormal            Final result                 Please view results for these tests on the individual orders.    CBC Auto Differential [[id number]]  (Abnormal) Collected:  11/23/19 0643    Specimen:  Blood Updated:  11/23/19 0646     WBC 5.56 10*3/mm3      RBC 3.51 10*6/mm3      Hemoglobin 10.9 g/dL      Hematocrit 32.5 %      MCV 92.6 fL      MCH 31.1 pg      MCHC 33.5 g/dL      RDW 11.9 %      RDW-SD 40.0 fl      MPV 8.0 fL      Platelets 252 10*3/mm3      Neutrophil % 72.2 %      Lymphocyte % 16.0 %      Monocyte % 9.4 %      Eosinophil % 1.6 %      Basophil % 0.4 %      Immature Grans % 0.4 %      Neutrophils, Absolute 4.02 10*3/mm3      Lymphocytes, Absolute 0.89 10*3/mm3      Monocytes, Absolute 0.52 10*3/mm3      Eosinophils, Absolute 0.09 10*3/mm3      Basophils, Absolute 0.02 10*3/mm3      Immature Grans, Absolute 0.02 10*3/mm3      nRBC 0.0 /100 WBC     Basic Metabolic Panel [[id number]]  (Abnormal) Collected:  11/23/19 0548    Specimen:  Blood Updated:  11/23/19 0626     Glucose 96 mg/dL      BUN 15 mg/dL      Creatinine 1.03 mg/dL      Sodium 132 mmol/L      Potassium 3.9 mmol/L      Chloride 96 mmol/L      CO2 20.0 mmol/L      Calcium 8.9 mg/dL      eGFR Non African Amer 51 mL/min/1.73      BUN/Creatinine Ratio 14.6     Anion Gap 16.0 mmol/L     Narrative:       GFR Normal >60  Chronic Kidney Disease <60  Kidney Failure <15    Basic Metabolic Panel [[id number]]  (Abnormal) Collected:  11/22/19 0449    Specimen:  Blood Updated:  11/22/19 0524     Glucose 93 mg/dL      BUN 16 mg/dL      Creatinine 1.03 mg/dL      Sodium 133 mmol/L      Potassium 3.9 mmol/L      Chloride 96 mmol/L      CO2 26.1 mmol/L      Calcium 9.0 mg/dL      eGFR Non African Amer 51 mL/min/1.73       BUN/Creatinine Ratio 15.5     Anion Gap 10.9 mmol/L     Narrative:       GFR Normal >60  Chronic Kidney Disease <60  Kidney Failure <15    CBC & Differential [[id number]] Collected:  11/22/19 0449    Specimen:  Blood Updated:  11/22/19 0513    Narrative:       The following orders were created for panel order CBC & Differential.  Procedure                               Abnormality         Status                     ---------                               -----------         ------                     CBC Auto Differential[[id number]]        Abnormal            Final result                 Please view results for these tests on the individual orders.    CBC Auto Differential [[id number]]  (Abnormal) Collected:  11/22/19 0449    Specimen:  Blood Updated:  11/22/19 0513     WBC 4.91 10*3/mm3      RBC 3.57 10*6/mm3      Hemoglobin 10.9 g/dL      Hematocrit 33.1 %      MCV 92.7 fL      MCH 30.5 pg      MCHC 32.9 g/dL      RDW 11.8 %      RDW-SD 39.8 fl      MPV 8.1 fL      Platelets 275 10*3/mm3      Neutrophil % 65.6 %      Lymphocyte % 20.8 %      Monocyte % 10.6 %      Eosinophil % 2.2 %      Basophil % 0.4 %      Immature Grans % 0.4 %      Neutrophils, Absolute 3.22 10*3/mm3      Lymphocytes, Absolute 1.02 10*3/mm3      Monocytes, Absolute 0.52 10*3/mm3      Eosinophils, Absolute 0.11 10*3/mm3      Basophils, Absolute 0.02 10*3/mm3      Immature Grans, Absolute 0.02 10*3/mm3      nRBC 0.0 /100 WBC     POC Glucose Once [[id number]]  (Normal) Collected:  11/21/19 1704    Specimen:  Blood Updated:  11/21/19 1728     Glucose 96 mg/dL     Scan Slide [[id number]] Collected:  11/21/19 0458    Specimen:  Blood Updated:  11/21/19 0620     Macrocytes Slight/1+     Polychromasia Slight/1+     WBC Morphology Normal     Platelet Estimate Adequate     Large Platelets Slight/1+    POC Glucose Once [[id number]]  (Normal) Collected:  11/21/19 0613    Specimen:  Blood Updated:  11/21/19 0619     Glucose 94 mg/dL     TSH [[id number]]   (Normal) Collected:  11/21/19 0458    Specimen:  Blood Updated:  11/21/19 0554     TSH 1.280 uIU/mL     Hemoglobin A1c [[id number]]  (Abnormal) Collected:  11/21/19 0458    Specimen:  Blood Updated:  11/21/19 0536     Hemoglobin A1C 4.60 %     Narrative:       Hemoglobin A1C Ranges:    Increased Risk for Diabetes  5.7% to 6.4%  Diabetes                     >= 6.5%  Diabetic Goal                < 7.0%    VRE Culture - Swab, Per Rectum [[id number]]  (Normal) Collected:  11/14/19 2141    Specimen:  Swab from Per Rectum Updated:  11/16/19 1918     VRE SCREEN CX No Vancomycin Resistant Enterococcus Isolated    MRSA Screen Culture - Swab, Nares [[id number]]  (Normal) Collected:  11/14/19 2141    Specimen:  Swab from Nares Updated:  11/15/19 2333     MRSA SCREEN CX No Methicillin Resistant Staphylococcus aureus isolated        Imaging Results (Most Recent)     None          PROCEDURES      Condition on Discharge: Stable    Physical Exam at Discharge  Vital Signs  Temp:  [97.6 °F (36.4 °C)-98 °F (36.7 °C)] 97.6 °F (36.4 °C)  Heart Rate:  [64-87] 78  Resp:  [18] 18  BP: (122-143)/(68-78) 143/68    Physical Exam:  Physical Exam   Constitutional: Patient appears well-developed and well-nourished and in no acute distress   Cardiovascular: Regular rate, regular rhythm, S1 normal and S2 normal.  Exam reveals no gallop and no friction rub.  Grade II/VI ALVA.  Pulmonary/Chest: Lungs are clear to auscultation bilaterally. No respiratory distress. No wheezes. No rhonchi. No rales.   Abdominal: Soft. Bowel sounds are normal. There is no tenderness.   Musculoskeletal: Normal Muscle tone  Extremities: No edema or asymmetry. Radial pulses are palpable and symmetric.  Neurological: Cranial nerves II-XII are grossly intact with no focal deficits.    Discharge Disposition  Home w/ home health    Visiting Nurse:    Yes    Home PT/OT:  Yes     Home Safety Evaluation:  Yes     DME  Straight cane    Discharge Diet:      Dietary Orders (From  admission, onward)    Start     Ordered    11/20/19 1812  Diet Regular; Thin; Cardiac, Consistent Carbohydrate  Diet Effective Now     Comments:  NO foods promoting GOUT   Question Answer Comment   Diet Texture / Consistency Regular    Fluid Consistency Thin    Common Modifiers Cardiac    Common Modifiers Consistent Carbohydrate        11/20/19 1811          Activity at Discharge:  As tolerated      Follow-up Appointments  Future Appointments   Date Time Provider Department Center   11/27/2019  1:00 PM Marino Higgins MD [facility] None     Additional Instructions for the Follow-ups that You Need to Schedule     Discharge Follow-up with PCP   As directed       Currently Documented PCP:    Marino Higgins MD    PCP Phone Number:    [phone]     Follow Up Details:  As scheduled         Discharge Follow-up with Specialty: [facility] as scheduled   As directed      Specialty:  [facility] as scheduled         Discharge Follow-up with Specialty: Ortho as scheduled   As directed      Specialty:  Ortho as scheduled               Test Results Pending at Discharge  None     Nitesh Christianson MD  11/23/19  2:13 PM

## 2019-11-27 PROBLEM — Z96.611 HISTORY OF RIGHT SHOULDER REPLACEMENT: Status: ACTIVE | Noted: 2019-01-01

## 2019-11-27 PROBLEM — Z51.89 ENCOUNTER FOR REHABILITATION: Status: RESOLVED | Noted: 2019-01-01 | Resolved: 2019-01-01

## 2019-11-27 PROBLEM — I65.22 STENOSIS OF LEFT CAROTID ARTERY: Status: ACTIVE | Noted: 2019-01-01

## 2019-11-27 PROBLEM — G89.29 CHRONIC RIGHT SHOULDER PAIN: Status: RESOLVED | Noted: 2019-03-12 | Resolved: 2019-01-01

## 2019-11-27 PROBLEM — M25.511 CHRONIC RIGHT SHOULDER PAIN: Status: RESOLVED | Noted: 2019-03-12 | Resolved: 2019-01-01

## 2019-11-27 NOTE — PROGRESS NOTES
Subjective   Siobhan Dueñas is a 84 y.o. female who is here for   Chief Complaint   Patient presents with   • Shoulder Pain     hospital f/u rt shoulder repair    .     History of Present Illness     S/p right shoulder replacement with dr Darling at [facility]  Then had inpat rehab stay in [place]  Current outpatient and discharge medications have been reconciled for the patient.  Reviewed by: Marino Higgins MD     Jamul now  Care tenders PT soon    Will see Dr Darling on Monday          The following portions of the patient's history were reviewed and updated as appropriate: allergies, current medications, past medical history, past social history, past surgical history and problem list.    Review of Systems    Objective   Physical Exam   Constitutional: She appears well-developed and well-nourished.   Cardiovascular: Normal rate.   Pulmonary/Chest: Effort normal.   Musculoskeletal:        Arms:  Psychiatric: She has a normal mood and affect.   Nursing note and vitals reviewed.      Assessment/Plan   Siobhan was seen today for shoulder pain.    Diagnoses and all orders for this visit:    Hospital discharge follow-up    Acute blood loss as cause of postoperative anemia    History of right shoulder replacement  -     HYDROcodone-acetaminophen (NORCO) 5-325 MG per tablet; Take 1 tablet by mouth Every 4 (Four) Hours As Needed for Severe Pain  (right shoulder replacment).    add OTC iron pill, once a day    There are no Patient Instructions on file for this visit.    Medications Discontinued During This Encounter   Medication Reason   • HYDROcodone-acetaminophen (NORCO) 5-325 MG per tablet Reorder        Return in about 6 months (around 5/27/2020).    Dr. Marino Higgins  [place], [place]

## 2020-01-01 ENCOUNTER — TREATMENT (OUTPATIENT)
Dept: PHYSICAL THERAPY | Facility: CLINIC | Age: 85
End: 2020-01-01

## 2020-01-01 ENCOUNTER — HOSPITAL ENCOUNTER (INPATIENT)
Facility: HOSPITAL | Age: 85
LOS: 6 days | Discharge: HOME HEALTH | End: 2020-05-01
Attending: INTERNAL MEDICINE | Admitting: INTERNAL MEDICINE

## 2020-01-01 ENCOUNTER — TELEPHONE (OUTPATIENT)
Dept: FAMILY MEDICINE CLINIC | Facility: CLINIC | Age: 85
End: 2020-01-01

## 2020-01-01 ENCOUNTER — OFFICE VISIT (OUTPATIENT)
Dept: ONCOLOGY | Facility: CLINIC | Age: 85
End: 2020-01-01

## 2020-01-01 ENCOUNTER — APPOINTMENT (OUTPATIENT)
Dept: LAB | Facility: HOSPITAL | Age: 85
End: 2020-01-01

## 2020-01-01 ENCOUNTER — LAB (OUTPATIENT)
Dept: LAB | Facility: HOSPITAL | Age: 85
End: 2020-01-01

## 2020-01-01 ENCOUNTER — OFFICE VISIT (OUTPATIENT)
Dept: FAMILY MEDICINE CLINIC | Facility: CLINIC | Age: 85
End: 2020-01-01

## 2020-01-01 ENCOUNTER — DOCUMENTATION (OUTPATIENT)
Dept: PHYSICAL THERAPY | Facility: CLINIC | Age: 85
End: 2020-01-01

## 2020-01-01 ENCOUNTER — TELEPHONE (OUTPATIENT)
Dept: ONCOLOGY | Facility: CLINIC | Age: 85
End: 2020-01-01

## 2020-01-01 VITALS
OXYGEN SATURATION: 98 % | BODY MASS INDEX: 31.07 KG/M2 | WEIGHT: 182 LBS | HEIGHT: 64 IN | HEART RATE: 58 BPM | TEMPERATURE: 97.6 F | SYSTOLIC BLOOD PRESSURE: 116 MMHG | RESPIRATION RATE: 18 BRPM | DIASTOLIC BLOOD PRESSURE: 78 MMHG

## 2020-01-01 VITALS
OXYGEN SATURATION: 96 % | WEIGHT: 176.25 LBS | HEIGHT: 64 IN | TEMPERATURE: 97.8 F | HEART RATE: 85 BPM | SYSTOLIC BLOOD PRESSURE: 128 MMHG | BODY MASS INDEX: 30.09 KG/M2 | DIASTOLIC BLOOD PRESSURE: 82 MMHG | RESPIRATION RATE: 18 BRPM

## 2020-01-01 VITALS
DIASTOLIC BLOOD PRESSURE: 87 MMHG | HEART RATE: 79 BPM | HEIGHT: 61 IN | BODY MASS INDEX: 35.82 KG/M2 | RESPIRATION RATE: 16 BRPM | OXYGEN SATURATION: 95 % | SYSTOLIC BLOOD PRESSURE: 158 MMHG | WEIGHT: 189.7 LBS | TEMPERATURE: 99.5 F

## 2020-01-01 DIAGNOSIS — R68.89 IMPAIRED FUNCTION OF UPPER EXTREMITY: ICD-10-CM

## 2020-01-01 DIAGNOSIS — M25.511 CHRONIC RIGHT SHOULDER PAIN: Primary | ICD-10-CM

## 2020-01-01 DIAGNOSIS — Z96.611 S/P REVERSE TOTAL SHOULDER ARTHROPLASTY, RIGHT: ICD-10-CM

## 2020-01-01 DIAGNOSIS — G89.29 CHRONIC RIGHT SHOULDER PAIN: Primary | ICD-10-CM

## 2020-01-01 DIAGNOSIS — E79.0 HYPERURICEMIA: ICD-10-CM

## 2020-01-01 DIAGNOSIS — I50.22 CHRONIC SYSTOLIC HEART FAILURE (HCC): Primary | ICD-10-CM

## 2020-01-01 DIAGNOSIS — M15.9 PRIMARY OSTEOARTHRITIS INVOLVING MULTIPLE JOINTS: ICD-10-CM

## 2020-01-01 DIAGNOSIS — F39 MOOD DISORDER (HCC): ICD-10-CM

## 2020-01-01 DIAGNOSIS — D47.2 MONOCLONAL GAMMOPATHY: Primary | ICD-10-CM

## 2020-01-01 DIAGNOSIS — I10 UNCONTROLLED HYPERTENSION: Primary | ICD-10-CM

## 2020-01-01 DIAGNOSIS — I48.91 ATRIAL FIBRILLATION WITH RVR (HCC): Primary | ICD-10-CM

## 2020-01-01 DIAGNOSIS — I63.20 CEREBROVASCULAR ACCIDENT (CVA) DUE TO OCCLUSION OF PRECEREBRAL ARTERY (HCC): ICD-10-CM

## 2020-01-01 DIAGNOSIS — Z09 HOSPITAL DISCHARGE FOLLOW-UP: ICD-10-CM

## 2020-01-01 DIAGNOSIS — E55.9 VITAMIN D DEFICIENCY: ICD-10-CM

## 2020-01-01 DIAGNOSIS — F41.9 ANXIETY: Primary | ICD-10-CM

## 2020-01-01 LAB
ALBUMIN SERPL-MCNC: 3.8 G/DL (ref 2.9–4.4)
ALBUMIN SERPL-MCNC: 4.1 G/DL (ref 3.5–5.2)
ALBUMIN/GLOB SERPL: 0.9 {RATIO} (ref 0.7–1.7)
ALBUMIN/GLOB SERPL: 1 G/DL (ref 1.1–2.4)
ALP SERPL-CCNC: 75 U/L (ref 38–116)
ALPHA1 GLOB FLD ELPH-MCNC: 0.3 G/DL (ref 0–0.4)
ALPHA2 GLOB SERPL ELPH-MCNC: 0.9 G/DL (ref 0.4–1)
ALT SERPL W P-5'-P-CCNC: <5 U/L (ref 0–33)
ANION GAP SERPL CALCULATED.3IONS-SCNC: 12 MMOL/L (ref 5–15)
AST SERPL-CCNC: 16 U/L (ref 0–32)
B-GLOBULIN SERPL ELPH-MCNC: 1.1 G/DL (ref 0.7–1.3)
BASOPHILS # BLD AUTO: 0.02 10*3/MM3 (ref 0–0.2)
BASOPHILS NFR BLD AUTO: 0.4 % (ref 0–1.5)
BILIRUB SERPL-MCNC: 0.5 MG/DL (ref 0.2–1.2)
BUN SERPL-MCNC: 28 MG/DL (ref 6–20)
BUN/CREAT SERPL: 25.9 (ref 7.3–30)
C DIFF GDH STL QL: NEGATIVE
CALCIUM SPEC-SCNC: 9.5 MG/DL (ref 8.5–10.2)
CAMPYLOBACTER STL CULT: NORMAL
CHLORIDE SERPL-SCNC: 99 MMOL/L (ref 98–107)
CO2 SERPL-SCNC: 26 MMOL/L (ref 22–29)
CREAT SERPL-MCNC: 1.08 MG/DL (ref 0.6–1.1)
DEPRECATED RDW RBC AUTO: 43.6 FL (ref 37–54)
E COLI SXT STL QL IA: NEGATIVE
EOSINOPHIL # BLD AUTO: 0.09 10*3/MM3 (ref 0–0.4)
EOSINOPHIL NFR BLD AUTO: 1.7 % (ref 0.3–6.2)
ERYTHROCYTE [DISTWIDTH] IN BLOOD BY AUTOMATED COUNT: 12.9 % (ref 12.3–15.4)
GAMMA GLOB SERPL ELPH-MCNC: 2 G/DL (ref 0.4–1.8)
GFR SERPL CREATININE-BSD FRML MDRD: 49 ML/MIN/1.73
GLOBULIN SER CALC-MCNC: 4.4 G/DL (ref 2.2–3.9)
GLOBULIN UR ELPH-MCNC: 4.3 GM/DL (ref 1.8–3.5)
GLUCOSE BLDC GLUCOMTR-MCNC: 101 MG/DL (ref 70–130)
GLUCOSE BLDC GLUCOMTR-MCNC: 102 MG/DL (ref 70–130)
GLUCOSE BLDC GLUCOMTR-MCNC: 102 MG/DL (ref 70–130)
GLUCOSE BLDC GLUCOMTR-MCNC: 103 MG/DL (ref 70–130)
GLUCOSE BLDC GLUCOMTR-MCNC: 106 MG/DL (ref 70–130)
GLUCOSE BLDC GLUCOMTR-MCNC: 106 MG/DL (ref 70–130)
GLUCOSE BLDC GLUCOMTR-MCNC: 107 MG/DL (ref 70–130)
GLUCOSE BLDC GLUCOMTR-MCNC: 112 MG/DL (ref 70–130)
GLUCOSE BLDC GLUCOMTR-MCNC: 115 MG/DL (ref 70–130)
GLUCOSE BLDC GLUCOMTR-MCNC: 130 MG/DL (ref 70–130)
GLUCOSE BLDC GLUCOMTR-MCNC: 89 MG/DL (ref 70–130)
GLUCOSE BLDC GLUCOMTR-MCNC: 90 MG/DL (ref 70–130)
GLUCOSE BLDC GLUCOMTR-MCNC: 92 MG/DL (ref 70–130)
GLUCOSE BLDC GLUCOMTR-MCNC: 94 MG/DL (ref 70–130)
GLUCOSE BLDC GLUCOMTR-MCNC: 95 MG/DL (ref 70–130)
GLUCOSE BLDC GLUCOMTR-MCNC: 96 MG/DL (ref 70–130)
GLUCOSE BLDC GLUCOMTR-MCNC: 99 MG/DL (ref 70–130)
GLUCOSE BLDC GLUCOMTR-MCNC: 99 MG/DL (ref 70–130)
GLUCOSE SERPL-MCNC: 103 MG/DL (ref 74–124)
HCT VFR BLD AUTO: 34.7 % (ref 34–46.6)
HGB BLD-MCNC: 11.6 G/DL (ref 12–15.9)
IGA SERPL-MCNC: 90 MG/DL (ref 64–422)
IGG SERPL-MCNC: 2043 MG/DL (ref 586–1602)
IGM SERPL-MCNC: 33 MG/DL (ref 26–217)
IMM GRANULOCYTES # BLD AUTO: 0.04 10*3/MM3 (ref 0–0.05)
IMM GRANULOCYTES NFR BLD AUTO: 0.8 % (ref 0–0.5)
INTERPRETATION SERPL IEP-IMP: ABNORMAL
KAPPA LC SERPL-MCNC: 25.3 MG/L (ref 3.3–19.4)
KAPPA LC/LAMBDA SER: 2.22 {RATIO} (ref 0.26–1.65)
LAMBDA LC FREE SERPL-MCNC: 11.4 MG/L (ref 5.7–26.3)
LYMPHOCYTES # BLD AUTO: 0.96 10*3/MM3 (ref 0.7–3.1)
LYMPHOCYTES NFR BLD AUTO: 18.3 % (ref 19.6–45.3)
Lab: ABNORMAL
Lab: NORMAL
Lab: NORMAL
M-SPIKE: 1.8 G/DL
MCH RBC QN AUTO: 30.9 PG (ref 26.6–33)
MCHC RBC AUTO-ENTMCNC: 33.4 G/DL (ref 31.5–35.7)
MCV RBC AUTO: 92.5 FL (ref 79–97)
MONOCYTES # BLD AUTO: 0.49 10*3/MM3 (ref 0.1–0.9)
MONOCYTES NFR BLD AUTO: 9.3 % (ref 5–12)
NEUTROPHILS NFR BLD AUTO: 3.66 10*3/MM3 (ref 1.7–7)
NEUTROPHILS NFR BLD AUTO: 69.5 % (ref 42.7–76)
NRBC BLD AUTO-RTO: 0 /100 WBC (ref 0–0.2)
PLATELET # BLD AUTO: 187 10*3/MM3 (ref 140–450)
PMV BLD AUTO: 8.4 FL (ref 6–12)
POTASSIUM SERPL-SCNC: 4.1 MMOL/L (ref 3.5–4.7)
PROT SERPL-MCNC: 8.2 G/DL (ref 6–8.5)
PROT SERPL-MCNC: 8.4 G/DL (ref 6.3–8)
RBC # BLD AUTO: 3.75 10*6/MM3 (ref 3.77–5.28)
SALM + SHIG STL CULT: NORMAL
SODIUM SERPL-SCNC: 137 MMOL/L (ref 134–145)
WBC # BLD AUTO: 5.26 10*3/MM3 (ref 3.4–10.8)

## 2020-01-01 PROCEDURE — 82962 GLUCOSE BLOOD TEST: CPT

## 2020-01-01 PROCEDURE — 99214 OFFICE O/P EST MOD 30 MIN: CPT | Performed by: INTERNAL MEDICINE

## 2020-01-01 PROCEDURE — 97140 MANUAL THERAPY 1/> REGIONS: CPT | Performed by: PHYSICAL THERAPIST

## 2020-01-01 PROCEDURE — 87045 FECES CULTURE AEROBIC BACT: CPT | Performed by: INTERNAL MEDICINE

## 2020-01-01 PROCEDURE — 97110 THERAPEUTIC EXERCISES: CPT | Performed by: PHYSICAL THERAPIST

## 2020-01-01 PROCEDURE — 97162 PT EVAL MOD COMPLEX 30 MIN: CPT | Performed by: PHYSICAL THERAPIST

## 2020-01-01 PROCEDURE — 97116 GAIT TRAINING THERAPY: CPT

## 2020-01-01 PROCEDURE — 87324 CLOSTRIDIUM AG IA: CPT | Performed by: INTERNAL MEDICINE

## 2020-01-01 PROCEDURE — 97110 THERAPEUTIC EXERCISES: CPT

## 2020-01-01 PROCEDURE — 97530 THERAPEUTIC ACTIVITIES: CPT

## 2020-01-01 PROCEDURE — 97535 SELF CARE MNGMENT TRAINING: CPT

## 2020-01-01 PROCEDURE — 99306 1ST NF CARE HIGH MDM 50: CPT | Performed by: INTERNAL MEDICINE

## 2020-01-01 PROCEDURE — 87427 SHIGA-LIKE TOXIN AG IA: CPT | Performed by: INTERNAL MEDICINE

## 2020-01-01 PROCEDURE — 80053 COMPREHEN METABOLIC PANEL: CPT

## 2020-01-01 PROCEDURE — 99214 OFFICE O/P EST MOD 30 MIN: CPT | Performed by: FAMILY MEDICINE

## 2020-01-01 PROCEDURE — 87046 STOOL CULTR AEROBIC BACT EA: CPT | Performed by: INTERNAL MEDICINE

## 2020-01-01 PROCEDURE — 97165 OT EVAL LOW COMPLEX 30 MIN: CPT

## 2020-01-01 PROCEDURE — 85025 COMPLETE CBC W/AUTO DIFF WBC: CPT | Performed by: INTERNAL MEDICINE

## 2020-01-01 PROCEDURE — 36415 COLL VENOUS BLD VENIPUNCTURE: CPT

## 2020-01-01 PROCEDURE — 97530 THERAPEUTIC ACTIVITIES: CPT | Performed by: PHYSICAL THERAPIST

## 2020-01-01 PROCEDURE — 87449 NOS EACH ORGANISM AG IA: CPT | Performed by: INTERNAL MEDICINE

## 2020-01-01 PROCEDURE — 36415 COLL VENOUS BLD VENIPUNCTURE: CPT | Performed by: INTERNAL MEDICINE

## 2020-01-01 PROCEDURE — 99239 HOSP IP/OBS DSCHRG MGMT >30: CPT | Performed by: INTERNAL MEDICINE

## 2020-01-01 RX ORDER — HYDROCODONE BITARTRATE AND ACETAMINOPHEN 5; 325 MG/1; MG/1
1 TABLET ORAL
Qty: 30 TABLET | Refills: 0 | Status: SHIPPED | OUTPATIENT
Start: 2020-01-01 | End: 2020-01-01 | Stop reason: SDUPTHER

## 2020-01-01 RX ORDER — LOPERAMIDE HYDROCHLORIDE 2 MG/1
2 CAPSULE ORAL 4 TIMES DAILY PRN
Status: DISCONTINUED | OUTPATIENT
Start: 2020-01-01 | End: 2020-01-01 | Stop reason: HOSPADM

## 2020-01-01 RX ORDER — SODIUM CHLORIDE 0.9 % (FLUSH) 0.9 %
10 SYRINGE (ML) INJECTION AS NEEDED
Status: DISCONTINUED | OUTPATIENT
Start: 2020-01-01 | End: 2020-01-01

## 2020-01-01 RX ORDER — HYDROCODONE BITARTRATE AND ACETAMINOPHEN 5; 325 MG/1; MG/1
1 TABLET ORAL 2 TIMES DAILY
Qty: 60 TABLET | Refills: 0 | Status: SHIPPED | OUTPATIENT
Start: 2020-01-01 | End: 2020-01-01 | Stop reason: SDUPTHER

## 2020-01-01 RX ORDER — DILTIAZEM HYDROCHLORIDE 120 MG/1
120 CAPSULE, COATED, EXTENDED RELEASE ORAL
Status: DISCONTINUED | OUTPATIENT
Start: 2020-01-01 | End: 2020-01-01 | Stop reason: HOSPADM

## 2020-01-01 RX ORDER — BUPROPION HYDROCHLORIDE 150 MG/1
150 TABLET ORAL EVERY MORNING
Status: DISCONTINUED | OUTPATIENT
Start: 2020-01-01 | End: 2020-01-01 | Stop reason: HOSPADM

## 2020-01-01 RX ORDER — SODIUM CHLORIDE 0.9 % (FLUSH) 0.9 %
10 SYRINGE (ML) INJECTION EVERY 12 HOURS SCHEDULED
Status: DISCONTINUED | OUTPATIENT
Start: 2020-01-01 | End: 2020-01-01

## 2020-01-01 RX ORDER — ALBUTEROL SULFATE 2.5 MG/3ML
2.5 SOLUTION RESPIRATORY (INHALATION) EVERY 6 HOURS PRN
Status: DISCONTINUED | OUTPATIENT
Start: 2020-01-01 | End: 2020-01-01

## 2020-01-01 RX ORDER — DEXTROSE MONOHYDRATE 25 G/50ML
25 INJECTION, SOLUTION INTRAVENOUS
Status: DISCONTINUED | OUTPATIENT
Start: 2020-01-01 | End: 2020-01-01 | Stop reason: HOSPADM

## 2020-01-01 RX ORDER — FUROSEMIDE 20 MG/1
20 TABLET ORAL DAILY
COMMUNITY
End: 2020-01-01 | Stop reason: SDUPTHER

## 2020-01-01 RX ORDER — TIZANIDINE 4 MG/1
4 TABLET ORAL EVERY 8 HOURS PRN
Qty: 60 TABLET | Refills: 0 | Status: SHIPPED | OUTPATIENT
Start: 2020-01-01

## 2020-01-01 RX ORDER — ASPIRIN 81 MG/1
81 TABLET, CHEWABLE ORAL DAILY
Status: DISCONTINUED | OUTPATIENT
Start: 2020-01-01 | End: 2020-01-01 | Stop reason: HOSPADM

## 2020-01-01 RX ORDER — LORAZEPAM 0.5 MG/1
0.5 TABLET ORAL 2 TIMES DAILY
Qty: 60 TABLET | Refills: 5 | Status: SHIPPED | OUTPATIENT
Start: 2020-01-01

## 2020-01-01 RX ORDER — CARVEDILOL 25 MG/1
25 TABLET ORAL 2 TIMES DAILY WITH MEALS
Status: DISCONTINUED | OUTPATIENT
Start: 2020-01-01 | End: 2020-01-01 | Stop reason: HOSPADM

## 2020-01-01 RX ORDER — AMLODIPINE BESYLATE 2.5 MG/1
2.5 TABLET ORAL DAILY
Qty: 30 TABLET | Refills: 0 | Status: SHIPPED | OUTPATIENT
Start: 2020-01-01 | End: 2020-01-01

## 2020-01-01 RX ORDER — ALBUTEROL SULFATE 90 UG/1
2 AEROSOL, METERED RESPIRATORY (INHALATION) EVERY 6 HOURS PRN
Status: DISCONTINUED | OUTPATIENT
Start: 2020-01-01 | End: 2020-01-01 | Stop reason: CLARIF

## 2020-01-01 RX ORDER — FUROSEMIDE 20 MG/1
TABLET ORAL
COMMUNITY
Start: 2020-01-01

## 2020-01-01 RX ORDER — CLOPIDOGREL BISULFATE 75 MG/1
TABLET ORAL
Qty: 90 TABLET | Refills: 0 | Status: SHIPPED | OUTPATIENT
Start: 2020-01-01 | End: 2020-01-01 | Stop reason: HOSPADM

## 2020-01-01 RX ORDER — AMLODIPINE BESYLATE 10 MG/1
TABLET ORAL
Qty: 30 TABLET | Refills: 0 | Status: SHIPPED | OUTPATIENT
Start: 2020-01-01 | End: 2020-01-01 | Stop reason: HOSPADM

## 2020-01-01 RX ORDER — CARVEDILOL 25 MG/1
50 TABLET ORAL 2 TIMES DAILY
COMMUNITY
Start: 2020-01-01 | End: 2020-01-01 | Stop reason: SDUPTHER

## 2020-01-01 RX ORDER — BUPROPION HYDROCHLORIDE 150 MG/1
TABLET ORAL
Qty: 30 TABLET | Refills: 0 | Status: SHIPPED | OUTPATIENT
Start: 2020-01-01 | End: 2020-01-01

## 2020-01-01 RX ORDER — APIXABAN 5 MG/1
TABLET, FILM COATED ORAL
COMMUNITY
Start: 2020-01-01

## 2020-01-01 RX ORDER — AMOXICILLIN 250 MG
2 CAPSULE ORAL NIGHTLY
Status: DISCONTINUED | OUTPATIENT
Start: 2020-01-01 | End: 2020-01-01 | Stop reason: HOSPADM

## 2020-01-01 RX ORDER — BUPROPION HYDROCHLORIDE 150 MG/1
TABLET ORAL
Qty: 90 TABLET | Refills: 1 | Status: SHIPPED | OUTPATIENT
Start: 2020-01-01

## 2020-01-01 RX ORDER — DILTIAZEM HYDROCHLORIDE 120 MG/1
120 CAPSULE, COATED, EXTENDED RELEASE ORAL
Qty: 30 CAPSULE | Refills: 0 | Status: SHIPPED | OUTPATIENT
Start: 2020-01-01 | End: 2020-01-01 | Stop reason: DRUGHIGH

## 2020-01-01 RX ORDER — ALLOPURINOL 100 MG/1
TABLET ORAL
Qty: 90 TABLET | Refills: 0 | Status: SHIPPED | OUTPATIENT
Start: 2020-01-01 | End: 2020-01-01

## 2020-01-01 RX ORDER — HYDROCODONE BITARTRATE AND ACETAMINOPHEN 5; 325 MG/1; MG/1
1 TABLET ORAL 2 TIMES DAILY
Qty: 60 TABLET | Refills: 0 | Status: SHIPPED | OUTPATIENT
Start: 2020-01-01

## 2020-01-01 RX ORDER — SENNOSIDES 8.6 MG
650 CAPSULE ORAL 2 TIMES DAILY
Status: DISCONTINUED | OUTPATIENT
Start: 2020-01-01 | End: 2020-01-01 | Stop reason: HOSPADM

## 2020-01-01 RX ORDER — ALBUTEROL SULFATE 90 UG/1
2 AEROSOL, METERED RESPIRATORY (INHALATION) EVERY 6 HOURS PRN
Status: DISCONTINUED | OUTPATIENT
Start: 2020-01-01 | End: 2020-01-01 | Stop reason: HOSPADM

## 2020-01-01 RX ORDER — SODIUM CHLORIDE 9 MG/ML
40 INJECTION, SOLUTION INTRAVENOUS AS NEEDED
Status: DISCONTINUED | OUTPATIENT
Start: 2020-01-01 | End: 2020-01-01

## 2020-01-01 RX ORDER — FUROSEMIDE 20 MG/1
20 TABLET ORAL DAILY
Status: DISCONTINUED | OUTPATIENT
Start: 2020-01-01 | End: 2020-01-01 | Stop reason: HOSPADM

## 2020-01-01 RX ORDER — AMOXICILLIN 250 MG
2 CAPSULE ORAL NIGHTLY
Qty: 60 TABLET | Refills: 0 | Status: SHIPPED | OUTPATIENT
Start: 2020-01-01 | End: 2020-01-01

## 2020-01-01 RX ORDER — FUROSEMIDE 20 MG/1
20 TABLET ORAL DAILY
Qty: 30 TABLET | Refills: 0 | Status: SHIPPED | OUTPATIENT
Start: 2020-01-01 | End: 2020-01-01

## 2020-01-01 RX ORDER — PANTOPRAZOLE SODIUM 40 MG/1
40 TABLET, DELAYED RELEASE ORAL EVERY MORNING
Status: DISCONTINUED | OUTPATIENT
Start: 2020-01-01 | End: 2020-01-01 | Stop reason: HOSPADM

## 2020-01-01 RX ORDER — ERGOCALCIFEROL 1.25 MG/1
CAPSULE ORAL
Qty: 4 CAPSULE | Refills: 9 | Status: SHIPPED | OUTPATIENT
Start: 2020-01-01

## 2020-01-01 RX ORDER — DILTIAZEM HYDROCHLORIDE 180 MG/1
180 CAPSULE, EXTENDED RELEASE ORAL 2 TIMES DAILY
COMMUNITY
Start: 2020-01-01

## 2020-01-01 RX ORDER — ALLOPURINOL 100 MG/1
100 TABLET ORAL DAILY
Status: DISCONTINUED | OUTPATIENT
Start: 2020-01-01 | End: 2020-01-01 | Stop reason: HOSPADM

## 2020-01-01 RX ORDER — ALLOPURINOL 100 MG/1
TABLET ORAL
Qty: 90 TABLET | Refills: 0 | Status: SHIPPED | OUTPATIENT
Start: 2020-01-01

## 2020-01-01 RX ORDER — BUPROPION HYDROCHLORIDE 150 MG/1
TABLET ORAL
Qty: 30 TABLET | Refills: 1 | Status: SHIPPED | OUTPATIENT
Start: 2020-01-01 | End: 2020-01-01

## 2020-01-01 RX ORDER — HYDRALAZINE HYDROCHLORIDE 10 MG/1
TABLET, FILM COATED ORAL
COMMUNITY
Start: 2020-01-01

## 2020-01-01 RX ORDER — ALLOPURINOL 100 MG/1
TABLET ORAL
Qty: 90 TABLET | Refills: 1 | Status: SHIPPED | OUTPATIENT
Start: 2020-01-01 | End: 2020-01-01

## 2020-01-01 RX ORDER — AMLODIPINE BESYLATE 10 MG/1
TABLET ORAL
Qty: 30 TABLET | Refills: 1 | Status: SHIPPED | OUTPATIENT
Start: 2020-01-01 | End: 2020-01-01

## 2020-01-01 RX ORDER — DILTIAZEM HYDROCHLORIDE 120 MG/1
180 CAPSULE, EXTENDED RELEASE ORAL DAILY
COMMUNITY
End: 2020-01-01 | Stop reason: HOSPADM

## 2020-01-01 RX ORDER — NICOTINE POLACRILEX 4 MG
15 LOZENGE BUCCAL
Status: DISCONTINUED | OUTPATIENT
Start: 2020-01-01 | End: 2020-01-01 | Stop reason: HOSPADM

## 2020-01-01 RX ADMIN — DILTIAZEM HYDROCHLORIDE 120 MG: 120 CAPSULE, COATED, EXTENDED RELEASE ORAL at 08:39

## 2020-01-01 RX ADMIN — ASPIRIN 81 MG 81 MG: 81 TABLET ORAL at 08:27

## 2020-01-01 RX ADMIN — CARVEDILOL 25 MG: 25 TABLET, FILM COATED ORAL at 18:47

## 2020-01-01 RX ADMIN — ALLOPURINOL 100 MG: 100 TABLET ORAL at 10:25

## 2020-01-01 RX ADMIN — PANTOPRAZOLE SODIUM 40 MG: 40 TABLET, DELAYED RELEASE ORAL at 06:21

## 2020-01-01 RX ADMIN — SENNOSIDES AND DOCUSATE SODIUM 2 TABLET: 8.6; 5 TABLET ORAL at 21:09

## 2020-01-01 RX ADMIN — ALLOPURINOL 100 MG: 100 TABLET ORAL at 09:20

## 2020-01-01 RX ADMIN — BUPROPION HYDROCHLORIDE 150 MG: 150 TABLET, EXTENDED RELEASE ORAL at 06:21

## 2020-01-01 RX ADMIN — CARVEDILOL 25 MG: 25 TABLET, FILM COATED ORAL at 17:27

## 2020-01-01 RX ADMIN — ALLOPURINOL 100 MG: 100 TABLET ORAL at 08:27

## 2020-01-01 RX ADMIN — ASPIRIN 81 MG 81 MG: 81 TABLET ORAL at 09:56

## 2020-01-01 RX ADMIN — ACETAMINOPHEN 650 MG: 650 TABLET, FILM COATED, EXTENDED RELEASE ORAL at 09:20

## 2020-01-01 RX ADMIN — ALLOPURINOL 100 MG: 100 TABLET ORAL at 09:56

## 2020-01-01 RX ADMIN — ACETAMINOPHEN 650 MG: 650 TABLET, FILM COATED, EXTENDED RELEASE ORAL at 09:57

## 2020-01-01 RX ADMIN — DILTIAZEM HYDROCHLORIDE 120 MG: 120 CAPSULE, COATED, EXTENDED RELEASE ORAL at 08:33

## 2020-01-01 RX ADMIN — APIXABAN 5 MG: 2.5 TABLET, FILM COATED ORAL at 09:56

## 2020-01-01 RX ADMIN — BUPROPION HYDROCHLORIDE 150 MG: 150 TABLET, EXTENDED RELEASE ORAL at 06:03

## 2020-01-01 RX ADMIN — APIXABAN 5 MG: 2.5 TABLET, FILM COATED ORAL at 08:27

## 2020-01-01 RX ADMIN — APIXABAN 5 MG: 2.5 TABLET, FILM COATED ORAL at 21:44

## 2020-01-01 RX ADMIN — SENNOSIDES AND DOCUSATE SODIUM 2 TABLET: 8.6; 5 TABLET ORAL at 20:46

## 2020-01-01 RX ADMIN — CARVEDILOL 25 MG: 25 TABLET, FILM COATED ORAL at 18:01

## 2020-01-01 RX ADMIN — APIXABAN 5 MG: 2.5 TABLET, FILM COATED ORAL at 21:28

## 2020-01-01 RX ADMIN — BUPROPION HYDROCHLORIDE 150 MG: 150 TABLET, EXTENDED RELEASE ORAL at 06:09

## 2020-01-01 RX ADMIN — ACETAMINOPHEN 650 MG: 650 TABLET, FILM COATED, EXTENDED RELEASE ORAL at 20:47

## 2020-01-01 RX ADMIN — CARVEDILOL 25 MG: 25 TABLET, FILM COATED ORAL at 09:56

## 2020-01-01 RX ADMIN — ALLOPURINOL 100 MG: 100 TABLET ORAL at 08:39

## 2020-01-01 RX ADMIN — DILTIAZEM HYDROCHLORIDE 120 MG: 120 CAPSULE, COATED, EXTENDED RELEASE ORAL at 09:20

## 2020-01-01 RX ADMIN — FUROSEMIDE 20 MG: 20 TABLET ORAL at 08:52

## 2020-01-01 RX ADMIN — FUROSEMIDE 20 MG: 20 TABLET ORAL at 10:25

## 2020-01-01 RX ADMIN — APIXABAN 5 MG: 2.5 TABLET, FILM COATED ORAL at 09:20

## 2020-01-01 RX ADMIN — ACETAMINOPHEN 650 MG: 650 TABLET, FILM COATED, EXTENDED RELEASE ORAL at 21:27

## 2020-01-01 RX ADMIN — APIXABAN 5 MG: 2.5 TABLET, FILM COATED ORAL at 08:39

## 2020-01-01 RX ADMIN — ACETAMINOPHEN 650 MG: 650 TABLET, FILM COATED, EXTENDED RELEASE ORAL at 21:44

## 2020-01-01 RX ADMIN — ACETAMINOPHEN 650 MG: 650 TABLET, FILM COATED, EXTENDED RELEASE ORAL at 10:25

## 2020-01-01 RX ADMIN — ASPIRIN 81 MG 81 MG: 81 TABLET ORAL at 08:39

## 2020-01-01 RX ADMIN — ASPIRIN 81 MG 81 MG: 81 TABLET ORAL at 08:52

## 2020-01-01 RX ADMIN — CARVEDILOL 25 MG: 25 TABLET, FILM COATED ORAL at 08:39

## 2020-01-01 RX ADMIN — DILTIAZEM HYDROCHLORIDE 120 MG: 120 CAPSULE, COATED, EXTENDED RELEASE ORAL at 10:25

## 2020-01-01 RX ADMIN — OFLOXACIN 50000 UNITS: 300 TABLET, COATED ORAL at 09:56

## 2020-01-01 RX ADMIN — BUPROPION HYDROCHLORIDE 150 MG: 150 TABLET, EXTENDED RELEASE ORAL at 10:25

## 2020-01-01 RX ADMIN — PANTOPRAZOLE SODIUM 40 MG: 40 TABLET, DELAYED RELEASE ORAL at 06:43

## 2020-01-01 RX ADMIN — APIXABAN 5 MG: 2.5 TABLET, FILM COATED ORAL at 20:46

## 2020-01-01 RX ADMIN — ACETAMINOPHEN 650 MG: 650 TABLET, FILM COATED, EXTENDED RELEASE ORAL at 20:45

## 2020-01-01 RX ADMIN — ACETAMINOPHEN 650 MG: 650 TABLET, FILM COATED, EXTENDED RELEASE ORAL at 20:28

## 2020-01-01 RX ADMIN — FUROSEMIDE 20 MG: 20 TABLET ORAL at 09:56

## 2020-01-01 RX ADMIN — BUPROPION HYDROCHLORIDE 150 MG: 150 TABLET, EXTENDED RELEASE ORAL at 06:42

## 2020-01-01 RX ADMIN — CARVEDILOL 25 MG: 25 TABLET, FILM COATED ORAL at 18:03

## 2020-01-01 RX ADMIN — ACETAMINOPHEN 650 MG: 650 TABLET, FILM COATED, EXTENDED RELEASE ORAL at 21:09

## 2020-01-01 RX ADMIN — CARVEDILOL 25 MG: 25 TABLET, FILM COATED ORAL at 09:20

## 2020-01-01 RX ADMIN — FUROSEMIDE 20 MG: 20 TABLET ORAL at 09:20

## 2020-01-01 RX ADMIN — ACETAMINOPHEN 650 MG: 650 TABLET, FILM COATED, EXTENDED RELEASE ORAL at 08:27

## 2020-01-01 RX ADMIN — ACETAMINOPHEN 650 MG: 650 TABLET, FILM COATED, EXTENDED RELEASE ORAL at 08:39

## 2020-01-01 RX ADMIN — FUROSEMIDE 20 MG: 20 TABLET ORAL at 08:39

## 2020-01-01 RX ADMIN — PANTOPRAZOLE SODIUM 40 MG: 40 TABLET, DELAYED RELEASE ORAL at 06:42

## 2020-01-01 RX ADMIN — APIXABAN 5 MG: 2.5 TABLET, FILM COATED ORAL at 08:53

## 2020-01-01 RX ADMIN — APIXABAN 5 MG: 2.5 TABLET, FILM COATED ORAL at 10:25

## 2020-01-01 RX ADMIN — ALLOPURINOL 100 MG: 100 TABLET ORAL at 08:53

## 2020-01-01 RX ADMIN — DILTIAZEM HYDROCHLORIDE 120 MG: 120 CAPSULE, COATED, EXTENDED RELEASE ORAL at 09:56

## 2020-01-01 RX ADMIN — APIXABAN 5 MG: 2.5 TABLET, FILM COATED ORAL at 20:28

## 2020-01-01 RX ADMIN — SENNOSIDES AND DOCUSATE SODIUM 2 TABLET: 8.6; 5 TABLET ORAL at 20:48

## 2020-01-01 RX ADMIN — CARVEDILOL 25 MG: 25 TABLET, FILM COATED ORAL at 08:27

## 2020-01-01 RX ADMIN — CARVEDILOL 25 MG: 25 TABLET, FILM COATED ORAL at 17:15

## 2020-01-01 RX ADMIN — CARVEDILOL 25 MG: 25 TABLET, FILM COATED ORAL at 08:53

## 2020-01-01 RX ADMIN — CARVEDILOL 25 MG: 25 TABLET, FILM COATED ORAL at 10:25

## 2020-01-01 RX ADMIN — DILTIAZEM HYDROCHLORIDE 120 MG: 120 CAPSULE, COATED, EXTENDED RELEASE ORAL at 08:52

## 2020-01-01 RX ADMIN — APIXABAN 5 MG: 2.5 TABLET, FILM COATED ORAL at 21:09

## 2020-01-01 RX ADMIN — ASPIRIN 81 MG 81 MG: 81 TABLET ORAL at 09:20

## 2020-01-01 RX ADMIN — APIXABAN 5 MG: 2.5 TABLET, FILM COATED ORAL at 20:47

## 2020-01-01 RX ADMIN — TUBERCULIN PURIFIED PROTEIN DERIVATIVE 5 UNITS: 5 INJECTION INTRADERMAL at 15:39

## 2020-01-01 RX ADMIN — PANTOPRAZOLE SODIUM 40 MG: 40 TABLET, DELAYED RELEASE ORAL at 06:09

## 2020-01-01 RX ADMIN — FUROSEMIDE 20 MG: 20 TABLET ORAL at 08:27

## 2020-01-01 RX ADMIN — PANTOPRAZOLE SODIUM 40 MG: 40 TABLET, DELAYED RELEASE ORAL at 06:10

## 2020-01-01 RX ADMIN — ASPIRIN 81 MG 81 MG: 81 TABLET ORAL at 10:25

## 2020-01-01 RX ADMIN — PANTOPRAZOLE SODIUM 40 MG: 40 TABLET, DELAYED RELEASE ORAL at 06:03

## 2020-01-01 RX ADMIN — ACETAMINOPHEN 650 MG: 650 TABLET, FILM COATED, EXTENDED RELEASE ORAL at 08:53

## 2020-01-01 RX ADMIN — BUPROPION HYDROCHLORIDE 150 MG: 150 TABLET, EXTENDED RELEASE ORAL at 06:43

## 2020-02-03 NOTE — PATIENT INSTRUCTIONS
Access Code: [email or web address]   URL: [email or web address]   Date: 02/03/2020   Prepared by: Flora Doll      Program Notes   continue using your pulleys     Exercises Supine Shoulder Flexion with Dowel AAROM - Palms Up- 15 reps- 1 sets- 5 hold- 2x daily   Supine Shoulder External Rotation AAROM with Dowel- 15 reps- 1 sets- 5 hold- 2x daily   Supine Shoulder Press AAROM in Abduction with Dowel- 15 reps- 1 sets- 5 hold- 2x daily   Supine Elbow Flexion Extension AROM- 10 reps- 2 sets- 2x daily   Supine Shoulder Alphabet- 26 reps- 2 sets- 2x daily   Shoulder Flexion Wall Slide with Towel- 15 reps- 1 sets- 1x daily     Patient was educated on findings of evaluation, purpose of treatment, and goals for therapy.  Treatment options discussed and questions answered.  Patient was educated on exercises/self treatment/pain relief techniques.

## 2020-02-03 NOTE — PROGRESS NOTES
Physical Therapy Initial Evaluation and Plan of Care    Patient: Siobhan Dueñas   : 1935  Diagnosis/ICD-10 Code:  Chronic right shoulder pain [M25.511, G89.29]  Referring practitioner: Jaison Darling MD    Subjective Evaluation    History of Present Illness  Date of surgery: 2019  Mechanism of injury: fell off commode in March  had inpatient stay in [facility] and had last visit 20    Pt reports that she has difficulty sleeping - can only sleep 2-3 hours at a time.  I've been working hard at home with my exercises. I'm unable to bathe myself or do my hair.       I've had many surgeries but this has been by far the worst as far as pain.    PMH - pacemaker, COPD, Diabetes Mellitus, osteopenia/osteoporosis, cancer (kidney tumor), lumbar/sacral fractures, TIA/CVA, heart failure, ISSA,     Pain  Current pain ratin  At best pain ratin  Location: R sh with radiation down arm  Quality: radiating and dull ache  Aggravating factors: overhead activity    Social Support  Lives with: adult children    Hand dominance: right    Treatments  Previous treatment: physical therapy and home therapy  Patient Goals  Patient goals for therapy: independence with ADLs/IADLs, decreased pain and increased strength  Patient goal: I want to return home with my adult grandson           Objective       Observations     Right Shoulder  Positive for incision (well healed but tender).     Palpation     Right Tenderness of the anterior deltoid, infraspinatus, middle deltoid, pectoralis major and upper trapezius.     Tenderness     Right Shoulder  Tenderness in the clavicle and subacromial bursa.     Active Range of Motion     Right Shoulder   Flexion: 125 degrees with pain  Abduction: 90 degrees with pain  External rotation 0°: with pain  Internal rotation 0°: with pain    Right Elbow   Normal active range of motion    Passive Range of Motion     Right Shoulder   Flexion: 130 degrees with pain  Abduction: 93  degrees with pain  External rotation 0°: 53 degrees with pain  Internal rotation 0°: 72 degrees     Strength/Myotome Testing     Right Shoulder     Planes of Motion   Flexion: 3   Abduction: 3   External rotation at 0°: 4   Internal rotation at 0°: 4-     Right Elbow   Flexion: 4+  Extension: 4+    Tests     Additional Tests Details  Deferred secondary to post op status    General Comments     Shoulder Comments   Pt experienced some dizziness from supine to sit and required SBA for ambulation within clinic.          Assessment & Plan     Assessment  Impairments: abnormal or restricted ROM, activity intolerance, impaired physical strength and pain with function  Assessment details: Siobhan Dueñas is a pleasant 84 y.o. female that presents presents with signs and symptoms consistent with 12 weeks s/p R reverse TSA . She presents with painful/decreased A/PROM, decreased R UE strength with associated pain, palp tenderness, + sleep disturbance and has difficulty using RUE for ADLs/personal care. Multiple comorbitities impacting rehab status.   Quick DASH is 32%. Pt would benefit from skilled PT services in order to address listed impairments and increase tolerance to normal daily activities including ADLs.  Prognosis: fair  Functional Limitations: carrying objects, lifting, sleeping, uncomfortable because of pain, reaching behind back and reaching overhead  Goals  Plan Goals: STG In 4 weeks  1. Pt to exhibit compliance/independence with HEP/jt protection/post op precautions.  2. Pt to exhibit >/= 130 degrees R shoulder flex/scapt to prepare for OH activities  3.  Improved sleep tolerance - allowing for at least 3 hours uninterrupted sleep   4.  Pt is able to perform AAROM shoulder fabd to 100 degrees with minimal shrug to demonstrate good scapular stability.     LTG In 6-12 weeks  1. R shoulder flex/abd/ER/IR >/= 4/5 to allow for push/pull and lifting activities.  2. Pain not > than 3/10 with ADLs  3.Patient to wash her  hair independently using RUE for self-care  [place]  4. Quick DASH < 20%  5. Patient to be able to place dishes in overhead cabinet using RUE independently for self-care independence  6. Pt demonstrates ability to perform self care activities in order to return home with adult grandson.       Plan  Therapy options: will be seen for skilled physical therapy services  Planned modality interventions: cryotherapy and thermotherapy (hydrocollator packs)  Planned therapy interventions: manual therapy, strengthening, stretching, therapeutic activities and neuromuscular re-education  Duration in visits: 20  Treatment plan discussed with: patient        Timed:  Manual Therapy:    15     mins  17658;  Therapeutic Exercise:    15     mins  18873;     Neuromuscular Alethea:    -    mins  33092;    Therapeutic Activity:     -     mins  65471;       Timed Treatment:   30   mins   Total Treatment:     60   mins    PT SIGNATURE: SHALA Ramirez License # [id number]  DATE TREATMENT INITIATED: 2/3/2020    Medicare Initial Certification  Certification Period: 5/3/2020  I certify that the therapy services are furnished while this patient is under my care.  The services outlined above are required by this patient, and will be reviewed every 90 days.     PHYSICIAN: Jaison Darling MD      DATE:     Please sign and return via fax to [phone].. Thank you, [facility].

## 2020-02-05 NOTE — PROGRESS NOTES
Physical Therapy Daily Progress Note        Patient: Siobhan Dueñas   : 1935  Diagnosis/ICD-10 Code:  Chronic right shoulder pain [M25.511, G89.29]  Referring practitioner: Jaison Darling MD  Date of Initial Visit: Type: THERAPY  Noted: 2/3/2020  Today's Date: 2020  Patient seen for 2 sessions         Siobhan Dueñas reports: no problems since last session.         Subjective     Objective   See Exercise, Manual, and Modality Logs for complete treatment.       Assessment/Plan  Instructed pt to bring HEP from home health to improve continuity with treatment.  Pt verbalized understanding.  Gentle progression of exercises this date and pt reported no complaints.  Will assess HH HEP next session and make appropriate modifications to exercises if necessary.     Progress per Plan of Care           Manual Therapy:    8     mins  15368;  Therapeutic Exercise:    36     mins  00932;     Neuromuscular Alethea:        mins  64357;    Therapeutic Activity:          mins  46469;     Gait Training:           mins  18836;     Ultrasound:          mins  90751;    Electrical Stimulation:         mins  04966 ( );  Dry Needling          mins self-pay    Timed Treatment:   44   mins   Total Treatment:     54   mins    Didi Trammell PTA  Physical Therapist Assistant

## 2020-02-11 NOTE — PROGRESS NOTES
Physical Therapy Daily Progress Note        Patient: Siobhan Dueñas   : 1935  Diagnosis/ICD-10 Code:  Chronic right shoulder pain [M25.511, G89.29]  Referring practitioner: Jaison Darling MD  Date of Initial Visit: Type: THERAPY  Noted: 2/3/2020  Today's Date: 2020  Patient seen for 3 sessions         Siobhan Dueñas reports: no problems after last session but she has been doing her exercises 3-4 times a day and it has made her shoulder sore.         Subjective     Objective   See Exercise, Manual, and Modality Logs for complete treatment.       Assessment/Plan  Reviewed exercises for HHPT and progressed treatment this date for improved consistency.  She tolerated progressions well however required increased verbal and tactile cues for technique especially alignment. Will monitor tolerance to progressions and update HEP/progress exercises as appropriate for continued progress towards goals and improved functional use of UE.      Progress per Plan of Care           Manual Therapy:    8     mins  00982;  Therapeutic Exercise:    22     mins  43815;     Neuromuscular Alethea:        mins  23369;    Therapeutic Activity:          mins  68045;     Gait Training:           mins  57724;     Ultrasound:          mins  12121;    Electrical Stimulation:         mins  11069 ( );  Dry Needling          mins self-pay    Timed Treatment:   30   mins   Total Treatment:     60   mins    Didi Trammell PTA  Physical Therapist Assistant

## 2020-02-17 NOTE — PROGRESS NOTES
Physical Therapy Daily Progress Note    Visit # : 4  Siobhan Dueñas reports: today is the first time I've been able to put on a bra since my surgery.  My shoulder is sore but overall improving.  Pain rated 6/10.    Subjective     Objective   See Exercise, Manual, and Modality Logs for complete treatment.     Assessment/Plan  Compensatory shrug noted with elevation activities.  Good tolerance to ex progressions.    Progress strengthening /stabilization /functional activity       Timed:  Manual Therapy:    10     mins  48484;  Therapeutic Exercise:    30     mins  98354;       Timed Treatment:   40   mins direct  Total Treatment:     57   mins      Shey Doll PT  Physical Therapist  [place] License # [id number]

## 2020-02-19 NOTE — PROGRESS NOTES
Physical Therapy Daily Progress Note        Patient: Siobhan Dueñas   : 1935  Diagnosis/ICD-10 Code:  Chronic right shoulder pain [M25.511, G89.29]  Referring practitioner: Jaison Darling MD  Date of Initial Visit: Type: THERAPY  Noted: 2/3/2020  Today's Date: 2020  Patient seen for 5 sessions         Siobhan Dueñas reports: she said her shoulder is sore today.         Subjective     Objective   See Exercise, Manual, and Modality Logs for complete treatment.       Assessment/Plan  Pt tolerated exercises well however reported some soreness with exercises but no modifications required.  She continued to require cues for technique with exercises however increased carryover and improved ability to comply noted this date. She continued to demonstrate ROM restrictions as well as strength deficits thus closely monitored for shoulder hiking and compensatory patterns.  Will continue to monitor technique and progress as tolerated.     Progress per Plan of Care           Manual Therapy:    10     mins  45147;  Therapeutic Exercise:    20     mins  98247;     Neuromuscular Alethea:        mins  86780;    Therapeutic Activity:          mins  80063;     Gait Training:           mins  92500;     Ultrasound:          mins  06825;    Electrical Stimulation:         mins  13915 ( );  Dry Needling          mins self-pay    Timed Treatment:   30   mins   Total Treatment:     70   mins    Didi Trammell PTA  Physical Therapist Assistant

## 2020-02-24 NOTE — PROGRESS NOTES
Physical Therapy Daily Progress Note         Visit # : 7  Siobhan Dueñas reports: sore today.  Doing ex 3 times a day and pulleys 4.    Subjective     Objective   See Exercise, Manual, and Modality Logs for complete treatment.   Tender at anterior joint line, normal mobility of joint with ROM exercises, no crepitus noted with exercises or ROM  Pt education:  Purpose of exercise, importance of not overdoing and increasing soreness.    Assessment/Plan  Patient overdoing exercises at home and overcompensating against gravity with AROM exercises.  Needs more scapular strength before returning to these.  Other Monitor patient from not overdoing exercises  Pulleys only 3 times a day, strengthening once.         Manual Therapy:    10     mins  07189;  Therapeutic Exercise:    30     mins  41693;     Neuromuscular Alethea:    -    mins  48965;    Therapeutic Activity:     -     mins  96998;     Gait Training:      -     mins  63459;     Ultrasound:     -     mins  90605;    Electrical Stimulation:    -     mins  07918 ( );  Dry Needling     -     mins self-pay    Timed Treatment:   40   mins   Total Treatment:     55   mins    Greta Ruiz, PT  Physical Therapist

## 2020-02-25 NOTE — PROGRESS NOTES
Physical Therapy Daily Progress Note        Patient: Siobhan Dueñas   : 1935  Diagnosis/ICD-10 Code:  Chronic right shoulder pain [M25.511, G89.29]  Referring practitioner: Jaison Darling MD  Date of Initial Visit: Type: THERAPY  Noted: 2/3/2020  Today's Date: 2020  Patient seen for 8 sessions         Siobhan Dueñas reports: her shoulder has been a little more sore and thinks it is from the weather.  She did her pulleys but no exercises last night or this morning.          Subjective     Objective       Active Range of Motion     Right Shoulder   Flexion: 136 degrees   Abduction: 140 degrees   External rotation 45°: 67 degrees   Internal rotation 45°: 62 degrees     Passive Range of Motion     Right Shoulder   Flexion: 141 degrees   Abduction: 158 degrees   External rotation 45°: 76 degrees   Internal rotation 45°: 74 degrees     Strength/Myotome Testing     Right Shoulder     Planes of Motion   Flexion: 3+   Abduction: 4-   External rotation at 0°: 4-   Internal rotation at 0°: 3+     Isolated Muscles   Biceps: 4   Triceps: 4+      See Exercise, Manual, and Modality Logs for complete treatment.       Assessment/Plan  Pt tolerated exercises well however continued to require cuing for technique and closely monitored to avoid substitutions.  She was able to return demonstrate improvements.  Continued with modifications made in previous session with focus on scapular strengthening to improve tolerance and technique with overhead activities.  She demonstrated improved ROM this date however continued weakness noted with MMT.  Will continue to monitor closely for appropriate progresssions.     Progress per Plan of Care           Manual Therapy:    12     mins  13161;  Therapeutic Exercise:    45     mins  59721;     Neuromuscular Alethea:        mins  25069;    Therapeutic Activity:          mins  25953;     Gait Training:           mins  68423;     Ultrasound:          mins  06689;    Electrical Stimulation:          mins  08621 ( );  Dry Needling          mins self-pay    Timed Treatment:   57   mins   Total Treatment:     84   mins    Didi Trammell PTA  Physical Therapist Assistant

## 2020-02-28 NOTE — PATIENT INSTRUCTIONS
Access Code: [email or web address]   URL: [email or web address]   Date: 02/28/2020   Prepared by: Flora Doll     Exercises   Sidelying Shoulder External Rotation - 10 reps - 2 sets - 3 hold - 1x daily   Standing Shoulder Internal Rotation AAROM Behind Back with Towel - 10 reps - 1 sets - 10 hold - 1x daily

## 2020-02-28 NOTE — PROGRESS NOTES
Physical Therapy Daily Progress Note    Visit # : 9  Siobhan Spenser reports: I saw the MD and he was very pleased with my progress.  I don't have to go back for 6 months.     Subjective     Objective   See Exercise, Manual, and Modality Logs for complete treatment.     Assessment/Plan  Pt was able to advance exercises today demonstrating improving strength.  Mild pain with abduction activities.  Pt was issued updated HEP printout to facilitate compliance and recall with ex progressions today.  Progress per Plan of Care       Timed:  Manual Therapy:    10     mins  80228;  Therapeutic Exercise:    35     mins  38736;       Timed Treatment:   45   mins   Total Treatment:     55   mins      Shey Doll, SHALA  Physical Therapist  [place] License # [id number]

## 2020-03-02 NOTE — PROGRESS NOTES
Re-Assessment / Re-Certification       Patient: Siobhan Dueñas   : 1935  Diagnosis/ICD-10 Code:  Chronic right shoulder pain [M25.511, G89.29]  Referring practitioner: Jaison Darling MD  Date of Initial Visit: 3/2/2020  Today's Date: 3/2/2020  Patient seen for 10 sessions      Subjective:   Siobhan Dueñas reports: pain at night, pain constant 7/10.  Subjective Questionnaire: QuickDASH: 65%  Clinical Progress: improved  Home Program Compliance: Yes  Treatment has included: therapeutic exercise, neuromuscular re-education, manual therapy, therapeutic activity and cryotherapy    Subjective   Objective       Tenderness     Right Shoulder  Tenderness in the clavicle.     Neurological Testing     Sensation     Shoulder     Right Shoulder   Intact: light touch    Active Range of Motion     Right Shoulder   Flexion: 114 degrees   Extension: 55 degrees   Abduction: 94 degrees   Internal rotation BTB: sacrum     Additional Active Range of Motion Details  Full elbow ROM    Passive Range of Motion     Right Shoulder   Flexion: 145 degrees with pain  Abduction: 114 degrees with pain  External rotation 45°: 70 degrees   External rotation 90°: 70 degrees   Internal rotation 45°: 70 degrees   Internal rotation 90°: 70 degrees     Strength/Myotome Testing     Right Shoulder     Planes of Motion   Flexion: 4-   Abduction: 4-   External rotation at 0°: 4   Internal rotation at 0°: 4+     Isolated Muscles   Biceps: 5   Triceps: 5      Assessment/Plan   Patient presents with improved ROM, strength, and pain.  Still has 7/10 pain with activity. Patient reports independence with all ADL's except washing hair and cooking.  Ready to decrease frequency to biw possibly to qiw if ROM continues to progress.  Progress toward previous goals: Partially Met    STG In 4 weeks  1. Pt to exhibit compliance/independence with HEP/jt protection/post op precautions. MET  2. Pt to exhibit >/= 130 degrees R shoulder flex/scapt to prepare for OH  activities NOT MET  3.  Improved sleep tolerance - allowing for at least 3 hours uninterrupted sleep MET  4.  Pt is able to perform AAROM shoulder abd to 100 degrees with minimal shrug to demonstrate good scapular stability. NOT MET    LTG In 6-12 weeks NOT mET  1. R shoulder flex/abd/ER/IR >/= 4/5 to allow for push/pull and lifting activities.  2. Pain not > than 3/10 with ADLs  3.Patient to wash her hair independently using RUE for self-care  Novi  4. Quick DASH < 20%  5. Patient to be able to place dishes in overhead cabinet using RUE independently for self-care independence  6. Pt demonstrates ability to perform self care activities in order to return home with adult grandson.       Recommendations: Continue with recommendations biw  Timeframe: 1 month  Prognosis to achieve goals: good    PT Signature: Greta Ruiz, PT      Based upon review of the patient's progress and continued therapy plan, it is my medical opinion that Siobhan Dueñas should continue physical therapy treatment at [facility]  [address] [place] [id number] [phone].    Signature: __________________________________  Jaison Darling MD    Manual Therapy:    10     mins  17630;  Therapeutic Exercise:    15     mins  33027;     Neuromuscular Alethea:    -    mins  40747;    Therapeutic Activity:     15     mins  28028;     Gait Training:      -     mins  83619;     Ultrasound:     -     mins  16607;    Electrical Stimulation:    -     mins  95140 ( );  Dry Needling     -     mins self-pay    Timed Treatment:   40   mins   Total Treatment:     68   mins

## 2020-03-09 NOTE — PROGRESS NOTES
Physical Therapy Daily Progress Note         Visit # : 11  Siobhan Dueñas reports: my shoulder is feeling better.  I feel a little dizzy today, not quite 100%. No fever, cough, or shortness of breath, I just feel weak.    Subjective     Objective   See Exercise, Manual, and Modality Logs for complete treatment.   Pulse 88 after Nustep    Assessment/Plan    Progress strengthening /stabilization /functional activity           Manual Therapy:    15     mins  99868;  Therapeutic Exercise:    32     mins  16951;     Neuromuscular Alethea:    -    mins  48786;    Therapeutic Activity:     -     mins  73623;     Gait Training:      -     mins  82147;     Ultrasound:     -     mins  74841;    Electrical Stimulation:    -     mins  78438 ( );  Dry Needling     -     mins self-pay    Timed Treatment:   47   mins   Total Treatment:     57   mins    Greta Ruzi, PT  Physical Therapist

## 2020-03-27 NOTE — PROGRESS NOTES
Spoke with patient's daughter on phone as patient was asleep.  She reports she is doing well, using her arm and no significant complaints of pain.  Patient instructed to continue her HEP.  All questions answered.  Patient instructed to email therapist or call clinic if issues or questions arise.     Greta Ruiz PT, OCS

## 2020-04-25 PROBLEM — I48.91 ATRIAL FIBRILLATION WITH RVR (HCC): Status: ACTIVE | Noted: 2020-01-01

## 2020-05-01 PROBLEM — R53.81 PHYSICAL DEBILITY: Status: ACTIVE | Noted: 2020-01-01

## 2020-05-01 NOTE — PROGRESS NOTES
To be discharged home today where she lives with her daughter. [facility] will be seeing her for PT, OT, and nursing. Follow up appointment with PCP,  on May 8, 2020 at 1PM. Virtual visit with A-fib clinic on May 6, 2020 at 10:30AM. No DME needed. Daughter will transport.

## 2020-05-01 NOTE — DISCHARGE SUMMARY
Siobhan Dueñas  1935  [id number]      Hospitalists Discharge Summary    Date of Admission: 4/25/2020  Date of Discharge:  5/1/2020    Primary Discharge Diagnoses: Acute Debility after Admission for Afib w/ RVR w/ Troponin elevation and hypoxia    Secondary Discharge Diagnoses:   DM2 w/o complication, not on long term insulin  Essential HTN  Chronic Diastolic HF  COPD w/o exacerbation  Hyperlipidemia  Prior CVA    PCP  Patient Care Team:  Marino Higgins MD as PCP - General (Family Medicine)  Marino Lilly MD as PCP - Claims Attributed  Ozzie Velasquez MD as Consulting Physician (Cardiology)  Saroj Barnard III, MD as Surgeon (Thoracic Surgery)  Nguyễn Montague MD as Consulting Physician (Ophthalmology)  Sukhi Swift DPM (Podiatry)  Marino Higgins MD as Referring Physician (Family Medicine)  Marino Lilly MD as Consulting Physician (Hematology and Oncology)  Jose Jarvis MD as Consulting Physician (Orthopedic Surgery)  Jaison Darling MD as Surgeon (Orthopedic Surgery)    Consults:   Consults     No orders found from 3/27/2020 to 4/26/2020.          CC:  Recent 3 day episode of palpitations and admission at [facility] for Atrial Fibrillation with RVR    History of Present Illness:  Patient was admitted at [facility] with hx of palpitations and shortness of breath and was found to have Atrial Fibrillation with RVR and chronic systolic CHF. Patient is also known to have HTN, HLD, Type 2 DM, hx of TIA/ CVA, COPD, GERD, Gout, Anxiety, Depression and Anemia. Her heart rate is better controlled and she has been put on Diltiazem and anticoagulation with Apixaban. Plan is for patient to see Dr Velasquez (Cardiology) in 4 weeks for outpatient cardioversion. Patient is very hard of hearing but seems to be doing well at this time.     Patient is coming to TCU today from [facility] for rehab    Hospital Course  Pt recovered well, and being discharged  home.    Physical Exam at Discharge  Vital Signs  Temp:  [97.8 °F (36.6 °C)] 97.8 °F (36.6 °C)  Heart Rate:  [79-85] 85  Resp:  [18] 18  BP: (127-138)/(58-82) 128/82    Physical Exam:  Physical Exam   Constitutional: No distress.   Eyes: Pupils are equal, round, and reactive to light.   Cardiovascular: Normal rate, regular rhythm and normal heart sounds. Exam reveals no friction rub.   No murmur heard.  Pulmonary/Chest: Effort normal and breath sounds normal. No stridor. No respiratory distress. She has no wheezes. She has no rales.   Abdominal: Soft. Bowel sounds are normal. She exhibits no distension. There is no tenderness. There is no guarding.   Musculoskeletal: She exhibits no edema.   Neurological: She is alert. No cranial nerve deficit.   Skin: Skin is warm and dry. She is not diaphoretic.   Nursing note and vitals reviewed.      Operations and Procedures Performed:        Allergies:  is allergic to hydromorphone; atenolol; codeine; diovan [valsartan]; gabapentin; nsaids; and oxycodone.    Michel  not reviewed    Discharge Medications:     Discharge Medications      New Medications      Instructions Start Date   dilTIAZem  MG 24 hr capsule  Commonly known as:  CARDIZEM CD  Replaces:  dilTIAZem 120 MG 24 hr capsule   120 mg, Oral, Every 24 Hours Scheduled   Start Date:  May 2, 2020     sennosides-docusate 8.6-50 MG per tablet  Commonly known as:  PERICOLACE   2 tablets, Oral, Nightly         Changes to Medications      Instructions Start Date   apixaban 5 MG tablet tablet  Commonly known as:  ELIQUIS  What changed:  when to take this   5 mg, Oral, Every 12 Hours Scheduled         Continue These Medications      Instructions Start Date   albuterol sulfate  (90 Base) MCG/ACT inhaler  Commonly known as:  PROVENTIL HFA;VENTOLIN HFA;PROAIR HFA   2 puffs, Inhalation      allopurinol 100 MG tablet  Commonly known as:  ZYLOPRIM   TAKE ONE TABLET BY MOUTH DAILY      aspirin 81 MG chewable tablet   81 mg,  Oral, Daily      buPROPion  MG 24 hr tablet  Commonly known as:  WELLBUTRIN XL   TAKE ONE TABLET BY MOUTH EVERY MORNING      carvedilol 25 MG tablet  Commonly known as:  COREG   25 mg, Oral, 2 Times Daily With Meals      famotidine 20 MG tablet  Commonly known as:  PEPCID   20 mg, Oral, Daily      furosemide 20 MG tablet  Commonly known as:  LASIX   20 mg, Oral, Daily      Repatha 140 MG/ML solution prefilled syringe  Generic drug:  Evolocumab   140 mg, Subcutaneous, Every 14 Days      TYLENOL 8 HOUR PO   650 mg, Oral, 2 Times Daily      vitamin D 1.25 MG (17528 UT) capsule capsule  Commonly known as:  ERGOCALCIFEROL   TAKE ONE CAPSULE BY MOUTH ONCE WEEKLY         Stop These Medications    amLODIPine 10 MG tablet  Commonly known as:  NORVASC     clopidogrel 75 MG tablet  Commonly known as:  PLAVIX     dilTIAZem 120 MG 24 hr capsule  Commonly known as:  TIAZAC  Replaced by:  dilTIAZem  MG 24 hr capsule     docusate sodium 100 MG capsule  Commonly known as:  COLACE     hydrALAZINE 25 MG tablet  Commonly known as:  APRESOLINE     HYDROcodone-acetaminophen 5-325 MG per tablet  Commonly known as:  NORCO     ondansetron 4 MG tablet  Commonly known as:  ZOFRAN            Last Lab Results:   Lab Results (last 72 hours)     Procedure Component Value Units Date/Time    POC Glucose Once [[id number]]  (Normal) Collected:  05/01/20 0730    Specimen:  Blood Updated:  05/01/20 0737     Glucose 99 mg/dL     Stool Culture (Reference Lab) - Stool, Per Rectum [[id number]] Collected:  04/26/20 1754    Specimen:  Stool from Per Rectum Updated:  04/30/20 2007     Salmonella/Shigella Screen Final report     Result 1 Comment     Comment: No Salmonella or Shigella recovered.        Campylobacter Culture Final report     Result 1 Comment     Comment: No Campylobacter species isolated.        E coli, Shiga toxin Assay Negative    Narrative:       Performed at:   - [address]  [id number]  Lab  Director: Enrique Ornelas PhD, Phone:  [phone]    POC Glucose Once [[id number]]  (Normal) Collected:  04/30/20 1717    Specimen:  Blood Updated:  04/30/20 1727     Glucose 115 mg/dL     POC Glucose Once [[id number]]  (Normal) Collected:  04/30/20 1149    Specimen:  Blood Updated:  04/30/20 1206     Glucose 101 mg/dL     POC Glucose Once [[id number]]  (Normal) Collected:  04/30/20 0615    Specimen:  Blood Updated:  04/30/20 0622     Glucose 94 mg/dL     POC Glucose Once [[id number]]  (Normal) Collected:  04/29/20 1626    Specimen:  Blood Updated:  04/29/20 1637     Glucose 130 mg/dL     POC Glucose Once [[id number]]  (Normal) Collected:  04/29/20 1154    Specimen:  Blood Updated:  04/29/20 1220     Glucose 103 mg/dL     POC Glucose Once [[id number]]  (Normal) Collected:  04/29/20 0643    Specimen:  Blood Updated:  04/29/20 0657     Glucose 102 mg/dL     POC Glucose Once [[id number]]  (Normal) Collected:  04/28/20 1638    Specimen:  Blood Updated:  04/28/20 1654     Glucose 92 mg/dL     POC Glucose Once [[id number]]  (Normal) Collected:  04/28/20 1138    Specimen:  Blood Updated:  04/28/20 1152     Glucose 99 mg/dL     POC Glucose Once [[id number]]  (Normal) Collected:  04/28/20 0748    Specimen:  Blood Updated:  04/28/20 0758     Glucose 106 mg/dL         No results found.    Condition on Discharge:  At her baseline    Discharge Disposition  To Home w/ home health    Visiting Nurse:    Yes     Home PT/OT:  Yes     Home Safety Evaluation:  No     DME  None    Discharge Diet:      Dietary Orders (From admission, onward)     Start     Ordered    04/28/20 1400  Snack: Daily: PB & 3 packs crackers & 2% milk at night  PM Snack     Comments:  Daily: PB & 3 packs crackers & 2% milk at night    04/28/20 1112    04/25/20 1530  Diet Regular; Consistent Carbohydrate  Diet Effective Now     Question Answer Comment   Diet Texture / Consistency Regular    Common Modifiers Consistent Carbohydrate        04/25/20 1530                 Activity at Discharge:  activity as tolerated      Pre-discharge education      Follow-up Appointments  Future Appointments   Date Time Provider Department Center   5/8/2020  1:00 PM Marino Higgins MD [facility] None   5/27/2020 10:00 AM [facility] None   6/3/2020  2:15 PM Marino Higgins MD [facility] None     Additional Instructions for the Follow-ups that You Need to Schedule     Discharge Follow-up with PCP   As directed       Currently Documented PCP:    Marino Higgins MD    PCP Phone Number:    [phone]     Follow Up Details:  1-2 weeks for post hospital follow up         Discharge Follow-up with Specified Provider: [facility]; 1 Month   As directed      To:  [facility]    Follow Up:  1 Month               Test Results Pending at Discharge       Wanda Boo MD  05/01/20  15:47    Time: Discharge > 30 min (if over 30 minutes give explanation as to why it took greater than 30 minutes) Med rec, medictions changes since [facility] admission, review of records from [place] discharge, and coordination with PT and case management

## 2020-05-04 NOTE — TELEPHONE ENCOUNTER
Aliza Blackman(daughter) called and said blood vessels in eyes have busted. Worried that ELIQUIS and aspirin may be causing this, said happened before. Wants to know if okay to stop Aspirin and see if helps. Her callback is [phone]     S/w daughter    Now on ELIQUIS for her a fib  Has a fresh red blood spot in the white of eye  Vision is the same  No pain    Ok to hold aspirin until i see her on Friday.    No e-mail address on record

## 2020-05-05 NOTE — TELEPHONE ENCOUNTER
[facility] NEEDS ORDERS FOR PT  2 TIMES A WEEK FOR 3 WEEKS THEN 1 TIME A WEEK  FOR 2 WEEKS  GAIT TRAINING, BALANCE, ENDURANCE AND FALL RISK REDUCTION.

## 2020-05-08 PROBLEM — Z96.611 STATUS POST REVERSE TOTAL REPLACEMENT OF RIGHT SHOULDER: Status: ACTIVE | Noted: 2019-01-01

## 2020-05-08 PROBLEM — D62 ACUTE BLOOD LOSS AS CAUSE OF POSTOPERATIVE ANEMIA: Status: RESOLVED | Noted: 2018-05-08 | Resolved: 2020-01-01

## 2020-05-08 NOTE — PROGRESS NOTES
Subjective   Siobhan GARCÍA Spenser is a 84 y.o. female who is here for   Chief Complaint   Patient presents with   • Follow-up     for hospital   .     History of Present Illness   Ms. Dueñas is here today with her daughter for hospital follow-up.  She has new onset atrial fibrillation.  Presented to [facility] ER a few weeks ago with RVR.  She was admitted details them given for rate control and increasing Coreg and starting Eliquis.  She was transferred to [place] for skilled rehab for a week did well there and now she is at home.  She is currently with home health [facility]; I signed those orders today.  Today she is having no chest pain or shortness of air.  Her edema in her legs is gone.  We went over all her new medications which includes Eliquis.  Her carvedilol was doubled and her amlodipine is stopped.  Now lives with her daughter full-time who cares for her.  As Ms. Dueñas has vascular dementia and has been treated with TIA prevention medications for years.  She has a scheduled cardioversion on May 26 with [facility] cardiology.    Her chronic pain from generalized osteoarthritis including spine is persistent.  She is not able to sleep.  She takes 1 Norco 5 mg in the evenings and she can get a couple hours of sleep.  I will renew that today      The following portions of the patient's history were reviewed and updated as appropriate: allergies, current medications, past family history, past medical history, past social history, past surgical history and problem list.    Review of Systems    Objective   Physical Exam   Constitutional: No distress.   Cardiovascular: An irregular rhythm present.   Pulmonary/Chest: No respiratory distress. She has no rales.   Neurological: She is alert.   Psychiatric: Cognition and memory are impaired.   Nursing note and vitals reviewed.    Current outpatient and discharge medications have been reconciled for the patient.  Reviewed by: Marino Higgins MD    Assessment/Plan   Siobhan was seen  today for follow-up.    Diagnoses and all orders for this visit:    Atrial fibrillation with RVR (CMS/HCC)    Hospital discharge follow-up    Primary osteoarthritis involving multiple joints  -     HYDROcodone-acetaminophen (Norco) 5-325 MG per tablet; Take 1 tablet by mouth every night at bedtime.    still in a fib today  No signs of heart failure.    There are no Patient Instructions on file for this visit.    Medications Discontinued During This Encounter   Medication Reason   • carvedilol (COREG) 25 MG tablet Duplicate order   • dilTIAZem CD (CARDIZEM CD) 120 MG 24 hr capsule Dose adjustment   • aspirin 81 MG chewable tablet *Therapy completed        Return in about 1 month (around 6/8/2020) for new medication follow up.    Dr. Marino Higgins  [place], [place]

## 2020-06-03 NOTE — TELEPHONE ENCOUNTER
Daughter advised she is having cognitive problems with her memory, home health advised she might benefit after working with her the past few weeks.     Verbal ok to add on additional home health services as requested  I did not place a order as I assume she is already current with [facility] health    Marino Higgins MD

## 2020-06-03 NOTE — TELEPHONE ENCOUNTER
Mirtha with [facility] called in requesting orders for Speech Therapy for Cognition, 2X a week for 3 weeks & 1x a week for 1 week    Mirtha call back [phone]    Not sure why she needs speech therapy  Call her daughter and ask

## 2020-06-11 NOTE — TELEPHONE ENCOUNTER
Pts daughter called requesting a refill for HYDROcodone-acetaminophen (Norco) 5-325 MG per tablet  Pt has been out of the medicine for a week.    [facility] [address] [place] [address] - [address] [facility] [address] - [phone]  - [phone]   [phone]

## 2020-06-26 NOTE — TELEPHONE ENCOUNTER
Marina from [facility] called and stated that she wanted to let Marino Higgins know the patient will continue to receive nursing for the next 9 weeks. Please advise.     Marina call back [phone]

## 2020-07-21 NOTE — TELEPHONE ENCOUNTER
Jenni with [facility] called asking if patient can get a refill of her pain medicine. States she went off it for a few weeks, but now is experiencing increased pain as well as increase blood pressure due to pain. Please advise if ok - blood pressure today was 182/110.

## 2020-07-29 NOTE — TELEPHONE ENCOUNTER
VICKIE WITH [facility] CALLED TO REPORT:    /102   NOT SYMPTOMATIC FROM BP   INCREASED ANXIETY AND DEPRESSION  NECK AND BACK PAIN   OUT OF NORCO 2 WEEKS    ON WELLBUTRIN 150-CAN THIS BE INCREASED OR SOMETHING ADDITIONAL TAKEN WITH IT    VICKIE   [phone]

## 2020-07-29 NOTE — TELEPHONE ENCOUNTER
Spoke with Polly on July 29, 2020 at 4:40 PM.  We will add Norvasc 2.5 mg to her Coreg and Diltiazem medication.  Update blood pressure readings in 1-2 days.  Have sent prescription to pharmacy.  She just started the pain medication a few days ago.  Will monitor her pain for now.

## 2020-08-14 NOTE — TELEPHONE ENCOUNTER
Jenni with [facility] called stating patients blood pressure is up due to increased pain, would like to increase norco to twice a day. Please advise.     Sounds reasonable  I will send in a new script for 2 a day norco  To [facility]

## 2020-08-19 NOTE — PROGRESS NOTES
Subjective: Recent studies reviewed, patient still describes ongoing issues including planned valve repair.    REASON FOR FOLLOWUP MGUS                           REQUESTING PHYSICIAN: Marino Higgins MD      History of Present Illness         The patient is an 84-year-old female with a history of essential hypertension, chronic systolic heart failure, diabetes, hyperlipidemia and COPD who experienced a try malleolar ankle fracture on the right admitted February 26 through March 02, 2018.  She evidently has sustained a fracture in January of this year.     She has a significant history that her daughter describes as being told of a right frontal bone lesion at [facility].  Review of [facility] care everywhere does include a CT scan of the brain in 2015 significant for lytic right frontal bone lesion that's nonspecific but raised the possibility of myeloma or metastatic disease.  The patient has a significant additional history of renal cell carcinoma with resection many years ago and this led to a whole body bone scan May 29, 2015 with no abnormal uptake in the right frontal bone again leaving the possibility of a myelomatous lesion or benign skull lesion.  Further history includes a review by Dr. Barnard in June 2016 for numerable tiny noncalcified pulmonary nodules that are felt to be sequela level granulomatous lung disease but no significant change seen on several CAT scans.     The patient, thereafter, head evaluations at [facility] for cardiovascular issues including adjustment of her medications, treatment for LV dysfunction admission for weakness associated with hyponatremia in February-medication related.  This in part evidently lead to a right trimalleolar ankle fracture that required repair on February 26, later development of gabapentin and treatment-induced encephalopathy which improving medication was discontinued.  Later unexpectedly in mid May she had further pain down her left foot revealing  a corner avulsion fracture at the base the middle phalanx second digit-?  Etiology and further as result of chronic back pain thoracic films revealing no acute osseous abnormality, multilevel degenerative disc disease.  There is no mention of potential myelomatous lesions.  As she is seen with her daughter May 21, 2018 they both indicate that she's been reviewed by neurology memory dysfunction  and now also struggles with a progressive dementia.     She was seen by her primary physician May 8 again for follow-up of her fracture as well as CKD stage I thank you blood loss is causing postoperative anemia.  Her studies noted March 18 included H&H of 9.5 and 30.9 with a white count of 5960, MCV of 92.0, MCH 28.3, MCHC of 30.7, platelet count of 185,000 and normal automated differential, creatinine of 0.94 with BUN of 12 and EGFR of 57.  Her follow-up H&H May 8 were 11.9 and 37.1 white count of 4810,, platelet count of 213,000 with MCV of 90.9, MCH of 29.2 and MCHC 32.1.  Additional exams revealed an albumin of 3.3, gammaglobulin of 1.9, M spike of 1.7, globulin of 4.1.  We are asked to see her May 21 for her slow recovery from postoperative anemia but recognizes her history of hyponatremia, fluid restriction and gradual recovery to her current hemoglobin of above 12 g percent suggestingcomponent hemodilution ongoing previously.  There is however her monoclonal gammopathy and a history has once again reviewed concerning this MGUS.     The patient underwent a series of studies including BUN and creatinine of 22 and 1.17, total protein of 8.9, sodium of 139, ferritin of 45.7, iron is 79, TIBC of 367, 22% saturation, B12 greater than 2000, MMA of 152 beta-2 microglobulin 3.1 and paraprotein studies-IgG of 2069, IgA of 88, IgM 41, M spike of 1.8 and light chains with free light kappa 24.1, free lambda light chain 14.9 with a ratio of 1.62.  These studies would be consistent with a monoclonal gammopathy of unknown  significance.  We discussed these findings in great detail and the patient is seen June 01, 2018.  She and her daughter are quite concerned about her cognitive decline and discusses with supportive oncology- Rena Rebolledo-with plans for the patient to be seen at the [email or web address] in the next 4 weeks.    The patient is follow-up August 20, 2018.  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  Additional exams included her proteins with an IgG of 2122, IgA 98, IgM of 53, M spike of 2.3 and CBC with H&H of 12.4 and 40.5 white count of 4620 and platelet count of 165,000.    The patient is next seen August 20, 2018.  Her exams do not suggest progression of myeloma though she has developed a swelling in her right shoulder fairly acutely that's limiting motion.  She was not able to be seen by geriatric physician and unfortunately is going to be seen by neurologist at [facility] for assessment of dementia.    The patient went on to have an assessment with MRI of the right shoulder showing a supraspinatus tear and enlargement of the bursa.  This is becoming quite painful for her and we discussed orthopedic assessment.  There is, fortunately, no evidence of malignancy involved here.       The patient is next seen December 05, 2018 with repeat paraprotein is included IgG of 1941, M spike of 1.8, kappa/lambda ratio of 1.7, normal CBC, CMP with creatinine 1.12, BUN 19 and total protein 8.6.  The studies are not significant change from previous.  We have also discussed reassessment per orthopedics and follow-up thereafter.    The patient is next seen with her daughter May 22, 2019.  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  Her  recent testing per paraprotein analysis is unchanged from previous checked May 8, 2019 with M spike of 1.7, IgG of 1775, kappa lambda ratio of 2.40.  The patient was asked to return for 1 year follow-up with repeat testing including a M spike of 1.8, globulin 4.4, IgG of 2043, CMP with BUN/creatinine of 28 1.08.  These changes are not substantially different from previous and the patient is felt to be stable per her MGUS.  Since the patient was seen here last she has undergone right reverse total shoulder replacement, did well postoperatively.      Additionally her daughter describes that Mrs. Dueñas is being assessed for aortic valve repair in the near future with LETICIA next week and subsequent assessment by cardiothoracic surgery.      Past Medical History:   Diagnosis Date   • Acute kidney failure (CMS/HCC)    • Anemia    • Anxiety    • Arthritis    • Asthma    • Cancer (CMS/HCC) 2004    Left kidney tumor   • Cataract    • CHF (congestive heart failure) (CMS/HCC)    • Chronic kidney disease    • COPD (chronic obstructive pulmonary disease) (CMS/HCC)    • Depression    • Diabetes mellitus (CMS/HCC)     Type 2   • GERD (gastroesophageal reflux disease)    • H/O Chronic systolic heart failure 02/2018   • H/O Gout    • H/O LV dysfunction    • H/O NICM (nonischemic cardiomyopathy) 2017   • H/O PONV (postoperative nausea and vomiting)    • H/O Syncope and collapse 2015   • H/O Valvular disease 2015    Mitral/Tricuspid   • Hyperlipidemia    • Hypertension    • Hyponatremia 02/2018   • Infectious viral hepatitis    • LBBB (left bundle branch block)    • Liver disease    • Myeloma (CMS/HCC)    • Osteoporosis    • Pacemaker    • Shortness of breath    • Stroke (CMS/HCC)     Memory loss   • TIA (transient ischemic attack)    • Urinary tract infection         Past Surgical History:   Procedure Laterality Date   • CARDIAC CATHETERIZATION      mild dz, no stents   • CATARACT EXTRACTION     • CHOLECYSTECTOMY     • COLONOSCOPY   12/2005    Polyps x4   • EYE SURGERY      Implants   • FOOT SURGERY Right 02/2018    ORIF   • FRACTURE SURGERY     • HEMORRHOIDECTOMY     • JOINT REPLACEMENT Right 2011   • KIDNEY SURGERY     • NEPHRECTOMY Left 11/2004   • ORIF ANKLE FRACTURE Right 02/2018    Trimalleolar; delayed healing   • PACEMAKER IMPLANTATION  05/29/2015    Medtronic dual chamber/Dr. Weber   • TOTAL SHOULDER REPLACEMENT Right 11/2019        Current Outpatient Medications on File Prior to Visit   Medication Sig Dispense Refill   • Acetaminophen (TYLENOL 8 HOUR PO) Take 650 mg by mouth 2 (Two) Times a Day.     • albuterol (PROVENTIL HFA;VENTOLIN HFA) 108 (90 Base) MCG/ACT inhaler Inhale 2 puffs.     • allopurinol (ZYLOPRIM) 100 MG tablet TAKE ONE TABLET BY MOUTH DAILY 90 tablet 0   • buPROPion XL (WELLBUTRIN XL) 150 MG 24 hr tablet TAKE ONE TABLET BY MOUTH EVERY MORNING 90 tablet 1   • carvedilol (COREG) 25 MG tablet Take 50 mg by mouth 2 (Two) Times a Day With Meals.     • dilTIAZem (TIAZAC) 180 MG 24 hr capsule Take 180 mg by mouth 2 (two) times a day.     • ELIQUIS 5 MG tablet tablet      • Evolocumab (REPATHA) 140 MG/ML solution prefilled syringe Inject 140 mg under the skin into the appropriate area as directed Every 14 (Fourteen) Days.     • famotidine (PEPCID) 20 MG tablet Take 20 mg by mouth Daily.     • furosemide (LASIX) 20 MG tablet      • hydrALAZINE (APRESOLINE) 10 MG tablet      • HYDROcodone-acetaminophen (Norco) 5-325 MG per tablet Take 1 tablet by mouth 2 (two) times a day. 60 tablet 0   • vitamin D (ERGOCALCIFEROL) 92517 units capsule capsule TAKE ONE CAPSULE BY MOUTH ONCE WEEKLY 4 capsule 10   • [DISCONTINUED] amLODIPine (Norvasc) 2.5 MG tablet Take 1 tablet by mouth Daily. 30 tablet 0     No current facility-administered medications on file prior to visit.         ALLERGIES:    Allergies   Allergen Reactions   • Hydromorphone Swelling   • Atenolol    • Codeine    • Diovan [Valsartan] Dizziness and Cough     Other  "reaction(s): Cough, Dizziness   • Gabapentin Confusion and Other (See Comments)   • Nsaids Other (See Comments)     CKD   • Oxycodone Swelling        Social History     Socioeconomic History   • Marital status:      Spouse name: Not on file   • Number of children: 2   • Years of education: High School   • Highest education level: Not on file   Occupational History     Employer: RETIRED   Tobacco Use   • Smoking status: Never Smoker   • Smokeless tobacco: Never Used   Substance and Sexual Activity   • Alcohol use: No   • Drug use: No   • Sexual activity: Not Currently     Partners: Male        Family History   Problem Relation Age of Onset   • Heart disease Mother    • Hypertension Mother    • Heart disease Father    • Hypertension Father    • Ovarian cancer Sister 50   • Dementia Sister    • Hypertension Sister    • Heart disease Sister    • Liver disease Sister    • Drug abuse Sister    • Stroke Sister    • Early death Sister    • Heart disease Brother    • Hypertension Brother    • Diabetes Other    • Anemia Other    • Cancer Other         Renal cell carcinoma   • Heart disease Other    • Hypertension Other    • Diabetes Other         Review of Systems   Constitutional: Positive for fatigue.   HENT: Negative.    Eyes: Negative.    Respiratory: Positive for shortness of breath. Negative for chest tightness and wheezing.    Cardiovascular: Positive for leg swelling.   Gastrointestinal: Negative.  Negative for abdominal pain, constipation, diarrhea, nausea and vomiting.   Genitourinary: Negative.    Musculoskeletal: Positive for arthralgias.   Allergic/Immunologic: Negative.    Neurological: Negative.  Negative for weakness.        Objective     Vitals:    08/26/20 1631   BP: 158/87   Pulse: 79   Resp: 16   Temp: 99.5 °F (37.5 °C)   TempSrc: Temporal   SpO2: 95%   Weight: 86 kg (189 lb 11.2 oz)   Height: 155.2 cm (61.1\")  Comment: new yearly height   PainSc: 0-No pain     Current Status 8/26/2020   ECOG score " 1       Physical Exam                                           Constitutional: She appears well-developed and well-nourished using a walker to aid ambulation  HEENT: Pupils pupils are equal and reactive to light, oropharynx clear without lesion  Pulmonary/Chest: Effort normal.  Clear bilaterally to auscultation and percussion   Cardiovascular: Normal rate regular rhythm with 3/6 systolic ejection murmur heard at left apex  Abdomen: Soft, nontender, moderately obese without hepatosplenomegaly or ascites   Musculoskeletal: Trace edema bilateral lower extremities.  Neurologic: Nonfocal     RECENT LABS:  Hematology WBC   Date Value Ref Range Status   08/26/2020 5.26 3.40 - 10.80 10*3/mm3 Final   04/23/2020 3.60 (L) 4.5 - 11.0 10*3/uL Final     RBC   Date Value Ref Range Status   08/26/2020 3.75 (L) 3.77 - 5.28 10*6/mm3 Final   04/23/2020 3.41 (L) 4.0 - 5.2 10*6/uL Final     Hemoglobin   Date Value Ref Range Status   08/26/2020 11.6 (L) 12.0 - 15.9 g/dL Final   04/23/2020 10.3 (L) 12.0 - 16.0 g/dL Final     Hematocrit   Date Value Ref Range Status   08/26/2020 34.7 34.0 - 46.6 % Final   04/23/2020 31.4 (L) 36.0 - 46.0 % Final     Platelets   Date Value Ref Range Status   08/26/2020 187 140 - 450 10*3/mm3 Final   04/23/2020 177 140 - 440 10*3/uL Final            Assessment/Plan    .  The patient is an 83-year-old female with a somewhat complex history including hypertension, systolic heart failure, diabetes, hyperlipidemia, COPD, slowly progressive memory dysfunction and recent development of further complications including trimalleolar fracture on the right with admission later in February to March for repair at which time she was experiencing hyponatremia from Celexa use.  After surgery she was transferred to a nursing facility where this medication, unfortunately, continued as did her hyponatremia and there is been some time spent trying to improve this issue for her.  Following this without, she injured her left foot  and had sustained an avulsion fracture at the base of the middle phalanx.  This too has been addressed orthopedically and she is slowly recovering.  All of this is somewhat complicated by her neurologic decline with memory dysfunction and apparent dementia.    As she was slow in recovery for her anemia status after her most recent surgery we are asked to see her in consultation finding that she has improved to nearly normal hemoglobin and hematocrit levels but does have a monoclonal gammopathy with a history, as described, of a potential skull lesion in 2015.  Her subsequent plain films do not demonstrate other lytic lesions including recently performed thoracic spine films.  We've discussed this may have occurred on the basis of progressive osteoporosis rather than myeloma and that the patient's anemia may well be improved as result of previous hemodilution-secondary to medication induced SIADH.  After considerable discussion with the patient and her daughter we went on to assess as described above revealing evidence of osteopenia and MGUS.  As the patient is seen June 1 she is stable hematologically without progression of anemia further.  After additional 30 minutes discussion we obtain follow-up laboratory exams and bone survey that failed to show any significant progression of disease.  The patient's back pain appears to have stabilized though she is having right shoulder abnormalities that, while consistent with lipoma, should be assessed under the circumstances and we plan an MRI of the right shoulder.plan.we proceeded with MRI of the right shoulder showed evidence of a right supraspinatus tear and bursa enlargement.  She will need an orthopedic assessment concerning this and will help schedule that for her. The patient was seen by orthopedics and treated symptomatically as follows with Dr. Boswell at this point.  We do not find progression of her MGUS we will assess her in approximately 6 months.  Please note  that neurocognitive testing is also scheduled December 11.  Following this the patient had additional orthopedic issues leading to assessment by both orthopedics and spine surgery.  Unfortunately she also experienced a TIA in her right shoulder surgery was held.  She is seen in office May 22, 2019 without any evidence on her studies digestive of a myelomatous involvement per bone.  Additionally paraprotein studies are essentially unchanged.  Hematologically she could proceed to right shoulder surgery repair which is her major issue at this point.      This did proceed November 2019 with the patient apparently doing relatively well.     We now follow her yearly reassessing her MGUS finding that she is hematologically stable when she is assessed August 26, 2020.  There is no suggestion of progression to multiple myeloma.    Plan:  *Hematologic clearance for planned cardiothoracic surgery    *Follow-up yearly with repeat CBC, CMP, MARISSA, PE and serum free light chains.

## 2020-09-09 NOTE — TELEPHONE ENCOUNTER
Jenni with [facility] called asking for possible rx for Ativan due to her anxiety 2x a day. Also stated her blood pressure today was 178/120. Please advise .    Ok Ativan 0.5 mg twice a day sent in

## 2020-09-15 NOTE — TELEPHONE ENCOUNTER
Jenni with [facility] called stating patient is still having elevated blood pressure due to pain. Asking if a muscle relaxer could be called in to help with neck and shoulder pains. Please advise.     Ok zanaflex sent in

## 2020-10-02 NOTE — TELEPHONE ENCOUNTER
Caller: GUILLERMO     Relationship: NURSE MED ASSIST    Best call back number: [phone]  Medication needed:   Requested Prescriptions     Pending Prescriptions Disp Refills   • HYDROcodone-acetaminophen (Norco) 5-325 MG per tablet 60 tablet 0     Sig: Take 1 tablet by mouth 2 (two) times a day.       When do you need the refill by:ASAP  Does the patient have less than a 3 day supply:  [x] Yes  [] No    What is the patient's preferred pharmacy: [facility] [facility] - [phone] [facility] [phone]

## 2023-10-30 NOTE — PROGRESS NOTES
Physical Therapy Daily Progress Note         Visit # : 6  Siobhan Dueñas reports: pain only with stretching. Still difficulty reaching overhead.    Subjective     Objective   See Exercise, Manual, and Modality Logs for complete treatment.       Assessment/Plan  Good tolerance with ROM exercises.  Compensatory shrug with abduction when fatigued. Clearly fatigued when performing all exercises in the clinic.  Suggested patient break up the exercises at home with rest breaks.  Progress strengthening /stabilization /functional activity  Change ER to sidelying         Manual Therapy:    8     mins  35200;  Therapeutic Exercise:    30     mins  35451;     Neuromuscular Alethea:    -    mins  24332;    Therapeutic Activity:     -     mins  70443;     Gait Training:      -     mins  71072;     Ultrasound:     -     mins  77097;    Electrical Stimulation:    -     mins  55011 ( );  Dry Needling     -     mins self-pay    Timed Treatment:   38   mins   Total Treatment:     55   mins    Greta Ruiz, PT  Physical Therapist                    
Detail Level: Zone